# Patient Record
Sex: MALE | Race: WHITE | NOT HISPANIC OR LATINO | Employment: OTHER | URBAN - METROPOLITAN AREA
[De-identification: names, ages, dates, MRNs, and addresses within clinical notes are randomized per-mention and may not be internally consistent; named-entity substitution may affect disease eponyms.]

---

## 2020-09-03 ENCOUNTER — HOSPITAL ENCOUNTER (INPATIENT)
Facility: HOSPITAL | Age: 70
LOS: 11 days | Discharge: NURSING, REHAB OR LONG-TERM CARE | DRG: 234 | End: 2020-09-14
Attending: INTERNAL MEDICINE | Admitting: INTERNAL MEDICINE
Payer: MEDICARE

## 2020-09-03 DIAGNOSIS — I21.4 NSTEMI (NON-ST ELEVATION MYOCARDIAL INFARCTION): ICD-10-CM

## 2020-09-03 DIAGNOSIS — E66.9 OBESITY (BMI 35.0-39.9 WITHOUT COMORBIDITY): ICD-10-CM

## 2020-09-03 DIAGNOSIS — I24.9 ACS (ACUTE CORONARY SYNDROME): ICD-10-CM

## 2020-09-03 DIAGNOSIS — E78.2 MIXED HYPERLIPIDEMIA: ICD-10-CM

## 2020-09-03 DIAGNOSIS — R53.81 DEBILITY: ICD-10-CM

## 2020-09-03 DIAGNOSIS — I21.3 STEMI (ST ELEVATION MYOCARDIAL INFARCTION): ICD-10-CM

## 2020-09-03 DIAGNOSIS — Z95.1 S/P CABG (CORONARY ARTERY BYPASS GRAFT): ICD-10-CM

## 2020-09-03 DIAGNOSIS — I21.4 NSTEMI (NON-ST ELEVATED MYOCARDIAL INFARCTION): ICD-10-CM

## 2020-09-03 DIAGNOSIS — I25.10 CAD (CORONARY ARTERY DISEASE): ICD-10-CM

## 2020-09-03 DIAGNOSIS — R07.9 CHEST PAIN: ICD-10-CM

## 2020-09-03 PROCEDURE — 20000000 HC ICU ROOM

## 2020-09-03 NOTE — Clinical Note
Catheter is inserted into the ostium   right coronary artery. Hemodynamics performed. Angiography performed of the right coronary arteries in multiple views.

## 2020-09-03 NOTE — Clinical Note
71 ml injected throughout the case. 79 mL total wasted during the case. 150 mL total used in the case.

## 2020-09-04 DIAGNOSIS — I25.10 CORONARY ARTERY DISEASE, ANGINA PRESENCE UNSPECIFIED, UNSPECIFIED VESSEL OR LESION TYPE, UNSPECIFIED WHETHER NATIVE OR TRANSPLANTED HEART: ICD-10-CM

## 2020-09-04 DIAGNOSIS — I21.4 NSTEMI (NON-ST ELEVATED MYOCARDIAL INFARCTION): Primary | ICD-10-CM

## 2020-09-04 PROBLEM — I10 ESSENTIAL HYPERTENSION: Status: ACTIVE | Noted: 2020-09-04

## 2020-09-04 PROBLEM — E78.2 MIXED HYPERLIPIDEMIA: Status: ACTIVE | Noted: 2020-09-04

## 2020-09-04 PROBLEM — E66.9 OBESITY (BMI 35.0-39.9 WITHOUT COMORBIDITY): Status: ACTIVE | Noted: 2020-09-04

## 2020-09-04 PROBLEM — I24.9 ACS (ACUTE CORONARY SYNDROME): Status: RESOLVED | Noted: 2020-09-03 | Resolved: 2020-09-04

## 2020-09-04 LAB
ABO + RH BLD: NORMAL
ALBUMIN SERPL BCP-MCNC: 3.4 G/DL (ref 3.5–5.2)
ALP SERPL-CCNC: 92 U/L (ref 55–135)
ALT SERPL W/O P-5'-P-CCNC: 20 U/L (ref 10–44)
ANION GAP SERPL CALC-SCNC: 8 MMOL/L (ref 8–16)
AORTIC ROOT ANNULUS: 3.44 CM
AORTIC VALVE CUSP SEPERATION: 1.88 CM
APTT BLDCRRT: 31.9 SEC (ref 21–32)
APTT BLDCRRT: 33.5 SEC (ref 21–32)
APTT BLDCRRT: 56 SEC (ref 21–32)
ASCENDING AORTA: 2.69 CM
AST SERPL-CCNC: 43 U/L (ref 10–40)
AV PEAK GRADIENT: 10 MMHG
AV VELOCITY RATIO: 0.8
BASOPHILS # BLD AUTO: 0.06 K/UL (ref 0–0.2)
BASOPHILS NFR BLD: 0.4 % (ref 0–1.9)
BILIRUB SERPL-MCNC: 0.8 MG/DL (ref 0.1–1)
BLD GP AB SCN CELLS X3 SERPL QL: NORMAL
BNP SERPL-MCNC: 46 PG/ML (ref 0–99)
BSA FOR ECHO PROCEDURE: 2.4 M2
BUN SERPL-MCNC: 13 MG/DL (ref 8–23)
CALCIUM SERPL-MCNC: 8.3 MG/DL (ref 8.7–10.5)
CHLORIDE SERPL-SCNC: 101 MMOL/L (ref 95–110)
CO2 SERPL-SCNC: 29 MMOL/L (ref 23–29)
CREAT SERPL-MCNC: 0.9 MG/DL (ref 0.5–1.4)
CV ECHO LV RWT: 0.54 CM
DIFFERENTIAL METHOD: ABNORMAL
DOP CALC AO PEAK VEL: 1.58 M/S
DOP CALC LVOT AREA: 3.7 CM2
DOP CALC LVOT DIAMETER: 2.18 CM
DOP CALC LVOT PEAK VEL: 1.26 M/S
DOP CALC LVOT STROKE VOLUME: 104.46 CM3
DOP CALCLVOT PEAK VEL VTI: 28 CM
E WAVE DECELERATION TIME: 212.55 MSEC
E/A RATIO: 0.96
ECHO LV POSTERIOR WALL: 1.27 CM (ref 0.6–1.1)
EOSINOPHIL # BLD AUTO: 0.5 K/UL (ref 0–0.5)
EOSINOPHIL NFR BLD: 3.5 % (ref 0–8)
ERYTHROCYTE [DISTWIDTH] IN BLOOD BY AUTOMATED COUNT: 13.2 % (ref 11.5–14.5)
EST. GFR  (AFRICAN AMERICAN): >60 ML/MIN/1.73 M^2
EST. GFR  (NON AFRICAN AMERICAN): >60 ML/MIN/1.73 M^2
FRACTIONAL SHORTENING: 29 % (ref 28–44)
GLUCOSE SERPL-MCNC: 187 MG/DL (ref 70–110)
HCT VFR BLD AUTO: 43.7 % (ref 40–54)
HGB BLD-MCNC: 14.1 G/DL (ref 14–18)
IMM GRANULOCYTES # BLD AUTO: 0.04 K/UL (ref 0–0.04)
IMM GRANULOCYTES NFR BLD AUTO: 0.3 % (ref 0–0.5)
INR PPP: 1 (ref 0.8–1.2)
INTERVENTRICULAR SEPTUM: 1.21 CM (ref 0.6–1.1)
IVRT: 103.81 MSEC
LA MAJOR: 5.8 CM
LA MINOR: 6 CM
LA WIDTH: 3.91 CM
LEFT ATRIUM SIZE: 3.08 CM
LEFT ATRIUM VOLUME INDEX: 26.1 ML/M2
LEFT ATRIUM VOLUME: 60.38 CM3
LEFT INTERNAL DIMENSION IN SYSTOLE: 3.38 CM (ref 2.1–4)
LEFT VENTRICLE DIASTOLIC VOLUME INDEX: 44.83 ML/M2
LEFT VENTRICLE DIASTOLIC VOLUME: 103.69 ML
LEFT VENTRICLE MASS INDEX: 97 G/M2
LEFT VENTRICLE SYSTOLIC VOLUME INDEX: 20.3 ML/M2
LEFT VENTRICLE SYSTOLIC VOLUME: 46.86 ML
LEFT VENTRICULAR INTERNAL DIMENSION IN DIASTOLE: 4.73 CM (ref 3.5–6)
LEFT VENTRICULAR MASS: 224.39 G
LYMPHOCYTES # BLD AUTO: 3.3 K/UL (ref 1–4.8)
LYMPHOCYTES NFR BLD: 23.4 % (ref 18–48)
MAGNESIUM SERPL-MCNC: 2 MG/DL (ref 1.6–2.6)
MCH RBC QN AUTO: 28.6 PG (ref 27–31)
MCHC RBC AUTO-ENTMCNC: 32.3 G/DL (ref 32–36)
MCV RBC AUTO: 89 FL (ref 82–98)
MONOCYTES # BLD AUTO: 1 K/UL (ref 0.3–1)
MONOCYTES NFR BLD: 7.1 % (ref 4–15)
MV PEAK A VEL: 1.08 M/S
MV PEAK E VEL: 1.04 M/S
NEUTROPHILS # BLD AUTO: 9.1 K/UL (ref 1.8–7.7)
NEUTROPHILS NFR BLD: 65.3 % (ref 38–73)
NRBC BLD-RTO: 0 /100 WBC
PHOSPHATE SERPL-MCNC: 3.5 MG/DL (ref 2.7–4.5)
PLATELET # BLD AUTO: 242 K/UL (ref 150–350)
PMV BLD AUTO: 11 FL (ref 9.2–12.9)
POC ACTIVATED CLOTTING TIME K: 164 SEC (ref 74–137)
POCT GLUCOSE: 181 MG/DL (ref 70–110)
POCT GLUCOSE: 181 MG/DL (ref 70–110)
POCT GLUCOSE: 250 MG/DL (ref 70–110)
POTASSIUM SERPL-SCNC: 4.2 MMOL/L (ref 3.5–5.1)
PROT SERPL-MCNC: 6.7 G/DL (ref 6–8.4)
PROTHROMBIN TIME: 11.1 SEC (ref 9–12.5)
PV PEAK VELOCITY: 0.84 CM/S
RA MAJOR: 5.41 CM
RA PRESSURE: 3 MMHG
RA WIDTH: 3.37 CM
RBC # BLD AUTO: 4.93 M/UL (ref 4.6–6.2)
RIGHT VENTRICULAR END-DIASTOLIC DIMENSION: 4.18 CM
SAMPLE: ABNORMAL
SARS-COV-2 RDRP RESP QL NAA+PROBE: NEGATIVE
SINUS: 3.24 CM
SODIUM SERPL-SCNC: 138 MMOL/L (ref 136–145)
STJ: 2.5 CM
TRICUSPID ANNULAR PLANE SYSTOLIC EXCURSION: 2.2 CM
TROPONIN I SERPL DL<=0.01 NG/ML-MCNC: 4.81 NG/ML (ref 0–0.03)
TROPONIN I SERPL DL<=0.01 NG/ML-MCNC: 9.94 NG/ML (ref 0–0.03)
WBC # BLD AUTO: 13.99 K/UL (ref 3.9–12.7)

## 2020-09-04 PROCEDURE — 93458 L HRT ARTERY/VENTRICLE ANGIO: CPT | Mod: 26,,, | Performed by: INTERNAL MEDICINE

## 2020-09-04 PROCEDURE — 93010 EKG 12-LEAD: ICD-10-PCS | Mod: 59,,, | Performed by: INTERNAL MEDICINE

## 2020-09-04 PROCEDURE — 36415 COLL VENOUS BLD VENIPUNCTURE: CPT

## 2020-09-04 PROCEDURE — 93010 ELECTROCARDIOGRAM REPORT: CPT | Mod: 59,,, | Performed by: INTERNAL MEDICINE

## 2020-09-04 PROCEDURE — 25000003 PHARM REV CODE 250: Performed by: INTERNAL MEDICINE

## 2020-09-04 PROCEDURE — C1769 GUIDE WIRE: HCPCS | Performed by: INTERNAL MEDICINE

## 2020-09-04 PROCEDURE — 99291 PR CRITICAL CARE, E/M 30-74 MINUTES: ICD-10-PCS | Mod: 25,,, | Performed by: INTERNAL MEDICINE

## 2020-09-04 PROCEDURE — 85610 PROTHROMBIN TIME: CPT

## 2020-09-04 PROCEDURE — 94761 N-INVAS EAR/PLS OXIMETRY MLT: CPT

## 2020-09-04 PROCEDURE — 63600175 PHARM REV CODE 636 W HCPCS: Performed by: INTERNAL MEDICINE

## 2020-09-04 PROCEDURE — 85025 COMPLETE CBC W/AUTO DIFF WBC: CPT

## 2020-09-04 PROCEDURE — 85347 COAGULATION TIME ACTIVATED: CPT | Performed by: INTERNAL MEDICINE

## 2020-09-04 PROCEDURE — C1894 INTRO/SHEATH, NON-LASER: HCPCS | Performed by: INTERNAL MEDICINE

## 2020-09-04 PROCEDURE — 99291 CRITICAL CARE FIRST HOUR: CPT | Mod: 25,,, | Performed by: INTERNAL MEDICINE

## 2020-09-04 PROCEDURE — 20000000 HC ICU ROOM

## 2020-09-04 PROCEDURE — C1887 CATHETER, GUIDING: HCPCS | Performed by: INTERNAL MEDICINE

## 2020-09-04 PROCEDURE — 85730 THROMBOPLASTIN TIME PARTIAL: CPT | Mod: 91

## 2020-09-04 PROCEDURE — 99152 PR MOD CONSCIOUS SEDATION, SAME PHYS, 5+ YRS, FIRST 15 MIN: ICD-10-PCS | Mod: ,,, | Performed by: INTERNAL MEDICINE

## 2020-09-04 PROCEDURE — 93005 ELECTROCARDIOGRAM TRACING: CPT

## 2020-09-04 PROCEDURE — 25500020 PHARM REV CODE 255: Performed by: INTERNAL MEDICINE

## 2020-09-04 PROCEDURE — 84100 ASSAY OF PHOSPHORUS: CPT

## 2020-09-04 PROCEDURE — 84484 ASSAY OF TROPONIN QUANT: CPT

## 2020-09-04 PROCEDURE — 93458 L HRT ARTERY/VENTRICLE ANGIO: CPT | Performed by: INTERNAL MEDICINE

## 2020-09-04 PROCEDURE — 93458 PR CATH PLACE/CORON ANGIO, IMG SUPER/INTERP,W LEFT HEART VENTRICULOGRAPHY: ICD-10-PCS | Mod: 26,,, | Performed by: INTERNAL MEDICINE

## 2020-09-04 PROCEDURE — U0002 COVID-19 LAB TEST NON-CDC: HCPCS

## 2020-09-04 PROCEDURE — 83735 ASSAY OF MAGNESIUM: CPT

## 2020-09-04 PROCEDURE — 80053 COMPREHEN METABOLIC PANEL: CPT

## 2020-09-04 PROCEDURE — 83880 ASSAY OF NATRIURETIC PEPTIDE: CPT

## 2020-09-04 PROCEDURE — 85730 THROMBOPLASTIN TIME PARTIAL: CPT

## 2020-09-04 PROCEDURE — 99152 MOD SED SAME PHYS/QHP 5/>YRS: CPT | Performed by: INTERNAL MEDICINE

## 2020-09-04 PROCEDURE — 99152 MOD SED SAME PHYS/QHP 5/>YRS: CPT | Mod: ,,, | Performed by: INTERNAL MEDICINE

## 2020-09-04 PROCEDURE — 86901 BLOOD TYPING SEROLOGIC RH(D): CPT

## 2020-09-04 RX ORDER — SODIUM CHLORIDE 9 MG/ML
INJECTION, SOLUTION INTRAVENOUS CONTINUOUS
Status: DISCONTINUED | OUTPATIENT
Start: 2020-09-04 | End: 2020-09-05

## 2020-09-04 RX ORDER — ACETAMINOPHEN 325 MG/1
650 TABLET ORAL EVERY 4 HOURS PRN
Status: DISCONTINUED | OUTPATIENT
Start: 2020-09-04 | End: 2020-09-04

## 2020-09-04 RX ORDER — HEPARIN SODIUM 1000 [USP'U]/ML
INJECTION, SOLUTION INTRAVENOUS; SUBCUTANEOUS
Status: DISCONTINUED | OUTPATIENT
Start: 2020-09-04 | End: 2020-09-04 | Stop reason: HOSPADM

## 2020-09-04 RX ORDER — MORPHINE SULFATE 10 MG/ML
3 INJECTION INTRAMUSCULAR; INTRAVENOUS; SUBCUTANEOUS
Status: DISCONTINUED | OUTPATIENT
Start: 2020-09-04 | End: 2020-09-05

## 2020-09-04 RX ORDER — MIDAZOLAM HYDROCHLORIDE 1 MG/ML
INJECTION, SOLUTION INTRAMUSCULAR; INTRAVENOUS
Status: DISCONTINUED | OUTPATIENT
Start: 2020-09-04 | End: 2020-09-04 | Stop reason: HOSPADM

## 2020-09-04 RX ORDER — CLOPIDOGREL BISULFATE 75 MG/1
75 TABLET ORAL DAILY
Status: DISCONTINUED | OUTPATIENT
Start: 2020-09-05 | End: 2020-09-06

## 2020-09-04 RX ORDER — GLUCAGON 1 MG
1 KIT INJECTION
Status: DISCONTINUED | OUTPATIENT
Start: 2020-09-04 | End: 2020-09-04

## 2020-09-04 RX ORDER — ONDANSETRON 4 MG/1
8 TABLET, ORALLY DISINTEGRATING ORAL EVERY 8 HOURS PRN
Status: DISCONTINUED | OUTPATIENT
Start: 2020-09-04 | End: 2020-09-05

## 2020-09-04 RX ORDER — ACETAMINOPHEN 325 MG/1
650 TABLET ORAL EVERY 4 HOURS PRN
Status: DISCONTINUED | OUTPATIENT
Start: 2020-09-04 | End: 2020-09-05

## 2020-09-04 RX ORDER — SODIUM CHLORIDE 9 MG/ML
INJECTION, SOLUTION INTRAVENOUS CONTINUOUS
Status: DISCONTINUED | OUTPATIENT
Start: 2020-09-04 | End: 2020-09-04

## 2020-09-04 RX ORDER — TALC
6 POWDER (GRAM) TOPICAL NIGHTLY PRN
Status: DISCONTINUED | OUTPATIENT
Start: 2020-09-04 | End: 2020-09-09

## 2020-09-04 RX ORDER — OXYCODONE HYDROCHLORIDE 5 MG/1
5 TABLET ORAL EVERY 6 HOURS PRN
Status: DISCONTINUED | OUTPATIENT
Start: 2020-09-04 | End: 2020-09-04

## 2020-09-04 RX ORDER — AMOXICILLIN 250 MG
1 CAPSULE ORAL 2 TIMES DAILY PRN
Status: DISCONTINUED | OUTPATIENT
Start: 2020-09-04 | End: 2020-09-14 | Stop reason: HOSPADM

## 2020-09-04 RX ORDER — VERAPAMIL HYDROCHLORIDE 2.5 MG/ML
INJECTION, SOLUTION INTRAVENOUS
Status: DISCONTINUED | OUTPATIENT
Start: 2020-09-04 | End: 2020-09-04 | Stop reason: HOSPADM

## 2020-09-04 RX ORDER — HEPARIN SODIUM,PORCINE/D5W 25000/250
12 INTRAVENOUS SOLUTION INTRAVENOUS CONTINUOUS
Status: DISCONTINUED | OUTPATIENT
Start: 2020-09-04 | End: 2020-09-06

## 2020-09-04 RX ORDER — MAG HYDROX/ALUMINUM HYD/SIMETH 200-200-20
30 SUSPENSION, ORAL (FINAL DOSE FORM) ORAL ONCE
Status: COMPLETED | OUTPATIENT
Start: 2020-09-04 | End: 2020-09-04

## 2020-09-04 RX ORDER — NITROGLYCERIN 20 MG/100ML
5 INJECTION INTRAVENOUS CONTINUOUS
Status: DISCONTINUED | OUTPATIENT
Start: 2020-09-04 | End: 2020-09-04

## 2020-09-04 RX ORDER — INSULIN ASPART 100 [IU]/ML
0-5 INJECTION, SOLUTION INTRAVENOUS; SUBCUTANEOUS EVERY 6 HOURS PRN
Status: DISCONTINUED | OUTPATIENT
Start: 2020-09-04 | End: 2020-09-09

## 2020-09-04 RX ORDER — LIDOCAINE HYDROCHLORIDE 20 MG/ML
5 SOLUTION OROPHARYNGEAL ONCE
Status: COMPLETED | OUTPATIENT
Start: 2020-09-04 | End: 2020-09-04

## 2020-09-04 RX ORDER — OXYCODONE HYDROCHLORIDE 5 MG/1
5 TABLET ORAL EVERY 4 HOURS PRN
Status: DISCONTINUED | OUTPATIENT
Start: 2020-09-04 | End: 2020-09-05

## 2020-09-04 RX ORDER — GLUCAGON 1 MG
1 KIT INJECTION
Status: DISCONTINUED | OUTPATIENT
Start: 2020-09-04 | End: 2020-09-10

## 2020-09-04 RX ORDER — SODIUM CHLORIDE 0.9 % (FLUSH) 0.9 %
10 SYRINGE (ML) INJECTION
Status: DISCONTINUED | OUTPATIENT
Start: 2020-09-04 | End: 2020-09-14 | Stop reason: HOSPADM

## 2020-09-04 RX ORDER — METOPROLOL TARTRATE 25 MG/1
12.5 TABLET ORAL 2 TIMES DAILY
Status: DISCONTINUED | OUTPATIENT
Start: 2020-09-04 | End: 2020-09-07

## 2020-09-04 RX ORDER — OXYCODONE HYDROCHLORIDE 5 MG/1
10 TABLET ORAL EVERY 6 HOURS PRN
Status: DISCONTINUED | OUTPATIENT
Start: 2020-09-04 | End: 2020-09-04

## 2020-09-04 RX ORDER — FENTANYL CITRATE 50 UG/ML
INJECTION, SOLUTION INTRAMUSCULAR; INTRAVENOUS
Status: DISCONTINUED | OUTPATIENT
Start: 2020-09-04 | End: 2020-09-04 | Stop reason: HOSPADM

## 2020-09-04 RX ORDER — MORPHINE SULFATE 10 MG/ML
4 INJECTION INTRAMUSCULAR; INTRAVENOUS; SUBCUTANEOUS EVERY 4 HOURS PRN
Status: DISCONTINUED | OUTPATIENT
Start: 2020-09-04 | End: 2020-09-04

## 2020-09-04 RX ORDER — NITROGLYCERIN 20 MG/100ML
INJECTION INTRAVENOUS
Status: DISCONTINUED | OUTPATIENT
Start: 2020-09-04 | End: 2020-09-04 | Stop reason: HOSPADM

## 2020-09-04 RX ORDER — CLOPIDOGREL 300 MG/1
600 TABLET, FILM COATED ORAL ONCE
Status: COMPLETED | OUTPATIENT
Start: 2020-09-04 | End: 2020-09-04

## 2020-09-04 RX ORDER — NAPROXEN SODIUM 220 MG/1
81 TABLET, FILM COATED ORAL DAILY
Status: DISCONTINUED | OUTPATIENT
Start: 2020-09-04 | End: 2020-09-12

## 2020-09-04 RX ORDER — ATORVASTATIN CALCIUM 20 MG/1
80 TABLET, FILM COATED ORAL NIGHTLY
Status: DISCONTINUED | OUTPATIENT
Start: 2020-09-04 | End: 2020-09-14 | Stop reason: HOSPADM

## 2020-09-04 RX ORDER — LIDOCAINE HYDROCHLORIDE 10 MG/ML
INJECTION, SOLUTION EPIDURAL; INFILTRATION; INTRACAUDAL; PERINEURAL
Status: DISCONTINUED | OUTPATIENT
Start: 2020-09-04 | End: 2020-09-04 | Stop reason: HOSPADM

## 2020-09-04 RX ORDER — INSULIN ASPART 100 [IU]/ML
0-5 INJECTION, SOLUTION INTRAVENOUS; SUBCUTANEOUS EVERY 6 HOURS PRN
Status: DISCONTINUED | OUTPATIENT
Start: 2020-09-04 | End: 2020-09-04

## 2020-09-04 RX ORDER — ONDANSETRON 2 MG/ML
4 INJECTION INTRAMUSCULAR; INTRAVENOUS EVERY 12 HOURS PRN
Status: DISCONTINUED | OUTPATIENT
Start: 2020-09-04 | End: 2020-09-05

## 2020-09-04 RX ADMIN — ATORVASTATIN CALCIUM 80 MG: 40 TABLET, FILM COATED ORAL at 08:09

## 2020-09-04 RX ADMIN — ASPIRIN 81 MG CHEWABLE TABLET 81 MG: 81 TABLET CHEWABLE at 07:09

## 2020-09-04 RX ADMIN — INSULIN ASPART 2 UNITS: 100 INJECTION, SOLUTION INTRAVENOUS; SUBCUTANEOUS at 05:09

## 2020-09-04 RX ADMIN — SODIUM CHLORIDE: 0.9 INJECTION, SOLUTION INTRAVENOUS at 04:09

## 2020-09-04 RX ADMIN — NITROGLYCERIN 5 MCG/MIN: 20 INJECTION INTRAVENOUS at 03:09

## 2020-09-04 RX ADMIN — CLOPIDOGREL BISULFATE 600 MG: 300 TABLET, FILM COATED ORAL at 03:09

## 2020-09-04 RX ADMIN — ATORVASTATIN CALCIUM 80 MG: 40 TABLET, FILM COATED ORAL at 03:09

## 2020-09-04 RX ADMIN — LIDOCAINE HYDROCHLORIDE 5 ML: 20 SOLUTION ORAL; TOPICAL at 02:09

## 2020-09-04 RX ADMIN — ALUMINUM HYDROXIDE, MAGNESIUM HYDROXIDE, AND SIMETHICONE 30 ML: 200; 200; 20 SUSPENSION ORAL at 02:09

## 2020-09-04 RX ADMIN — SODIUM CHLORIDE 500 ML: 0.9 INJECTION, SOLUTION INTRAVENOUS at 04:09

## 2020-09-04 RX ADMIN — SODIUM CHLORIDE: 0.9 INJECTION, SOLUTION INTRAVENOUS at 10:09

## 2020-09-04 RX ADMIN — ACETAMINOPHEN 650 MG: 325 TABLET ORAL at 04:09

## 2020-09-04 RX ADMIN — SODIUM CHLORIDE: 0.9 INJECTION, SOLUTION INTRAVENOUS at 08:09

## 2020-09-04 RX ADMIN — METOPROLOL TARTRATE 12.5 MG: 25 TABLET, FILM COATED ORAL at 08:09

## 2020-09-04 RX ADMIN — HEPARIN SODIUM AND DEXTROSE 16 UNITS/KG/HR: 10000; 5 INJECTION INTRAVENOUS at 06:09

## 2020-09-04 NOTE — ED PROVIDER NOTES
Patient was not evaluated by the emergency room.  Patient stop the emergency room for COVID swab.  Patient is a direct admit transfer from an outside medical facility.     Judd Camp MD  09/04/20 0043

## 2020-09-04 NOTE — PROGRESS NOTES
[facility]  ICU Shift Summary  Date: 9/4/2020      COVID Test: (--)  Isolation: No active isolations     Prehospitalization: Mehdisarah Polanco  Admit Date / LOS : 9/3/2020/ 1 days    Diagnosis: NSTEMI (non-ST elevated myocardial infarction)    Consults:        Active: Cardio       Needed: N/A     Code Status: Full Code   Advanced Directive: <no information>    LDA: PIV       Central Lines/Site/Justification:Patient Does Not Have Central Line       Urinary Cath/Order/Justification:Patient Does Not Have Urinary Catheter    Vasopressors/Infusions:    sodium chloride 0.9% 100 mL/hr at 09/04/20 1700    heparin (porcine) in D5W 16.036 Units/kg/hr (09/04/20 1700)          GOALS: Volume/ Hemodynamic: HR < 50                     RASS: 0  alert and calm    Pain Management: PO       Pain Controlled: yes     Rhythm: NSR    Respiratory Device: Nasal Cannula                  Most Recent SBT/ SAT: N/A       MOVE Screen: FAIL    VTE Prophylaxis: Mechanical  Mobility: Bedrest  Stress Ulcer Prophylaxis: No    Dietary: PO  Tolerance: yes  /  Advancement: yes    I & O (24h):    Intake/Output Summary (Last 24 hours) at 9/4/2020 1829  Last data filed at 9/4/2020 1700  Gross per 24 hour   Intake 1624.34 ml   Output 1200 ml   Net 424.34 ml        Restraints: No    Significant Dates:  Post Op Date: N/A  Rescue Date: N/A  Imaging/ Diagnostics: N/A    Noteworthy Labs:  none    CBC/Anemia Labs: Coags:    Recent Labs   Lab 09/04/20  0159   WBC 13.99*   HGB 14.1   HCT 43.7      MCV 89   RDW 13.2    Recent Labs   Lab 09/04/20  0159 09/04/20  1207   INR 1.0  --    APTT 31.9 33.5*        Chemistries:   Recent Labs   Lab 09/04/20  0159      K 4.2      CO2 29   BUN 13   CREATININE 0.9   CALCIUM 8.3*   PROT 6.7   BILITOT 0.8   ALKPHOS 92   ALT 20   AST 43*   MG 2.0   PHOS 3.5        Cardiac Enzymes: Ejection Fractions:    Recent Labs     09/04/20  0159 09/04/20  0745   TROPONINI 4.814* 9.943*    No results found  for: EF     POCT Glucose: HbA1c:    Recent Labs   Lab 09/04/20  0645 09/04/20  1749   POCTGLUCOSE 181* 250*    No results found for: HGBA1C        ICU LOS 18h  Level of Care: OK to Transfer    Shift Summary/Plan for the shift: Patient went to cath lab and had an ECHO done. Found blocks in LAD and circumflex. Plan is to have patient transferred to [facility] for open heart surgery. VS stable. Patient remains free from falls, injury, and breakdown throughout shift.

## 2020-09-04 NOTE — ASSESSMENT & PLAN NOTE
Patient with non-STEMI.  On nitro and heparin drips.  Discussed various options patient.  After detailed discussion decided to proceed with a coronary angiogram.    Risks, benefits and alternatives of the catheterization procedure were discussed with the patient.The risks of coronary angiography include but are not limited to: bleeding, infection, death, heart attack, arrhythmia, kidney injury or failure, potential need for dialysis, allergic reactions, stroke, need for emergency surgery, hematoma, pseudoaneurysm etc.  Should stenting be indicated, the patient has agreed to dual anti-platelet therapy for 1-consecutive year with a drug-eluting stent and a minimum of 1-month with the use of a bare metal stent. Additionally, pt is aware that non-compliance is likely to result in stent clotting with heart attack, heart failure, and/or death  The risks of moderate sedation include hypotension, respiratory depression, arrhythmias, bronchospasm, and death. Informed consent was obtained and the  patient is agreeable to proceed with the procedure. Consent was placed on the chart.    Continue aspirin, plavix, statins.

## 2020-09-04 NOTE — EICU
EICU    Pt seen on video, pt appears quite stable, nonlabored breathing alert and oriented  138/74 hr 64 97% nc    Nursing advised that nocturnist Dr Frias is taking care of the admission.  I asked her to let Dr Frias know to let me if any issues, that pt does not have an admit note and I will leave one if he would like  - I advised to call EICU if any issues, I am available for anything

## 2020-09-04 NOTE — H&P
"[facility] Hospital Medicine  History & Physical    Patient Name: Bc Garcia  MRN: [id number]  Admission Date: 9/3/2020  Attending Physician: No att. providers found   Primary Care Provider: Primary Doctor No         Patient information was obtained from patient, past medical records and ER records.     Subjective:     Principal Problem:NSTEMI (non-ST elevated myocardial infarction)    Chief Complaint: No chief complaint on file.       HPI: Mr. Garcia is a 69yo man with a past medical history of HLD, tobacco abuse (smoked 2 ppd x 20 years, quit 95), obesity, HTN and DM2.  He does see a local cardiologist in [place], [place].  He tells me he had an echo and nuclear stress test in May of this year.  The echo was normal, as was the nuclear stress test, "other than one little red hot-spot he was worried about."  He never underwent a LHC.    He was in his regular state of health until 6 pm yesterday when he developed substernal, burning chest pain while walking his dog.  This lasted about 1 hour, then eased off.  After arriving home, he was lifting an ice chest and developed the same pain again.  The pain radiated up into both shoulders.  This lasted until he finally went to bed later that evening.  He awoke at 10am and felt better.     By mid-day his home generator went out and he started to become overheated.  This precipitated a second event, similar to yesterday's.  This time the pain was a 10/10, substernal, and again burning.  He states he has had bad GERD before, but that this was nothing like that sensation.  He does deny SOB, nausea and associated diaphoresis.  He was unable to lie flat, as the pain worsened, then improved with sitting up.      In the ED at [facility] at , he was treated with ASA 325mg,  1" NTP to CW, morphine 4mg iv, IV heparin infusion, and zofran 4mg iv.  Outside labs revealed a troponin of 0.36, MB 7.5 in the ED.  CXR was normal.    Currently he is " "still having CP, stated as 4/10.    Past Medical History:   Diagnosis Date    Diabetes mellitus     Hyperlipidemia     Hypertension     Morbid obesity due to excess calories        Past Surgical History:   Procedure Laterality Date    left knee skin excision Left        Review of patient's allergies indicates:  No Known Allergies    Home medications:  Patient states:  "I've got a bag of them at home.  I really can't remember any of them.  I know I used to be on Metformin, but they changed it to another pill.  My wife will bring the bag of medications tomorrow."    Family History     Problem Relation (Age of Onset)    Cancer Father    Diabetes Paternal Grandmother    Stroke Mother        Tobacco Use    Smoking status: Former Smoker     Packs/day: 2.00     Years: 20.00     Pack years: 40.00     Types: Cigarettes     Quit date: 1995     Years since quittin.0   Substance and Sexual Activity    Alcohol use: Not Currently     Comment: Quit in     Drug use: Never    Sexual activity: Not on file     Review of Systems   Constitutional: Positive for activity change. Negative for chills, fatigue and fever.   HENT: Negative for congestion.    Eyes: Negative for redness.   Respiratory: Positive for chest tightness and shortness of breath. Negative for cough.    Cardiovascular: Positive for chest pain.   Gastrointestinal: Negative for abdominal pain, constipation, diarrhea, nausea and vomiting.   Endocrine: Positive for heat intolerance.   Genitourinary: Negative for dysuria.   Musculoskeletal: Negative for arthralgias.   Skin: Negative for wound.   Allergic/Immunologic: Negative for immunocompromised state.   Neurological: Positive for light-headedness. Negative for dizziness and weakness.   Hematological: Does not bruise/bleed easily.   Psychiatric/Behavioral: Negative for confusion. The patient is not nervous/anxious.      Objective:     Vital Signs (Most Recent):  Temp: 97.8 °F (36.6 °C) (20 " 0130)  Pulse: 69 (09/04/20 0300)  Resp: 12 (09/04/20 0300)  BP: (!) 155/76 (09/04/20 0230)  SpO2: 99 % (09/04/20 0300) Vital Signs (24h Range):  Temp:  [97.8 °F (36.6 °C)-98.6 °F (37 °C)] 97.8 °F (36.6 °C)  Pulse:  [63-72] 69  Resp:  [12-27] 12  SpO2:  [96 %-100 %] 99 %  BP: (136-165)/(73-85) 155/76     Weight: 118.4 kg (261 lb 0.4 oz)  Body mass index is 38.55 kg/m².    Physical Exam  Vitals signs and nursing note reviewed.   Constitutional:       General: He is not in acute distress.     Appearance: He is well-developed. He is morbidly obese. He is not ill-appearing, toxic-appearing or diaphoretic.   HENT:      Head: Normocephalic and atraumatic.      Nose: Nose normal.      Mouth/Throat:      Pharynx: No oropharyngeal exudate.   Eyes:      General: No scleral icterus.        Right eye: No discharge.         Left eye: No discharge.      Conjunctiva/sclera: Conjunctivae normal.      Pupils: Pupils are equal, round, and reactive to light.   Neck:      Musculoskeletal: Normal range of motion and neck supple.      Thyroid: No thyromegaly.      Vascular: No JVD.      Trachea: No tracheal deviation.   Cardiovascular:      Rate and Rhythm: Normal rate and regular rhythm.      Heart sounds: Normal heart sounds. No murmur. No friction rub. No gallop.    Pulmonary:      Effort: Pulmonary effort is normal. No respiratory distress.      Breath sounds: Normal breath sounds. No stridor. No decreased breath sounds, wheezing, rhonchi or rales.   Chest:      Chest wall: No tenderness.   Abdominal:      General: Bowel sounds are normal. There is no distension.      Palpations: Abdomen is soft. There is no mass.      Tenderness: There is no abdominal tenderness. There is no guarding or rebound.      Comments: Truncal obesity   Genitourinary:     Comments: No landers in place  Musculoskeletal: Normal range of motion.         General: No tenderness.   Lymphadenopathy:      Cervical: No cervical adenopathy.      Comments: No peripheral  edema   Skin:     General: Skin is warm and dry.      Coloration: Skin is not pale.      Findings: No erythema or rash.   Neurological:      Mental Status: He is alert and oriented to person, place, and time.      GCS: GCS eye subscore is 4. GCS verbal subscore is 5. GCS motor subscore is 6.      Cranial Nerves: No cranial nerve deficit.      Motor: No abnormal muscle tone.      Coordination: Coordination normal.      Deep Tendon Reflexes: Reflexes normal.   Psychiatric:         Behavior: Behavior normal.         Thought Content: Thought content normal.         Cognition and Memory: Cognition and memory normal.         Judgment: Judgment normal.           CRANIAL NERVES     CN III, IV, VI   Pupils are equal, round, and reactive to light.       Significant Labs:   Recent Results (from the past 24 hour(s))   COVID-19 Rapid Screening    Collection Time: 09/04/20 12:09 AM   Result Value Ref Range    SARS-CoV-2 RNA, Amplification, Qual Negative Negative   APTT    Collection Time: 09/04/20  1:59 AM   Result Value Ref Range    aPTT 31.9 21.0 - 32.0 sec   Protime-INR    Collection Time: 09/04/20  1:59 AM   Result Value Ref Range    Prothrombin Time 11.1 9.0 - 12.5 sec    INR 1.0 0.8 - 1.2   CBC auto differential    Collection Time: 09/04/20  1:59 AM   Result Value Ref Range    WBC 13.99 (H) 3.90 - 12.70 K/uL    RBC 4.93 4.60 - 6.20 M/uL    Hemoglobin 14.1 14.0 - 18.0 g/dL    Hematocrit 43.7 40.0 - 54.0 %    Mean Corpuscular Volume 89 82 - 98 fL    Mean Corpuscular Hemoglobin 28.6 27.0 - 31.0 pg    Mean Corpuscular Hemoglobin Conc 32.3 32.0 - 36.0 g/dL    RDW 13.2 11.5 - 14.5 %    Platelets 242 150 - 350 K/uL    MPV 11.0 9.2 - 12.9 fL    Immature Granulocytes 0.3 0.0 - 0.5 %    Gran # (ANC) 9.1 (H) 1.8 - 7.7 K/uL    Immature Grans (Abs) 0.04 0.00 - 0.04 K/uL    Lymph # 3.3 1.0 - 4.8 K/uL    Mono # 1.0 0.3 - 1.0 K/uL    Eos # 0.5 0.0 - 0.5 K/uL    Baso # 0.06 0.00 - 0.20 K/uL    nRBC 0 0 /100 WBC    Gran% 65.3 38.0 - 73.0 %  "   Lymph% 23.4 18.0 - 48.0 %    Mono% 7.1 4.0 - 15.0 %    Eosinophil% 3.5 0.0 - 8.0 %    Basophil% 0.4 0.0 - 1.9 %    Differential Method Automated    Comprehensive metabolic panel    Collection Time: 09/04/20  1:59 AM   Result Value Ref Range    Sodium 138 136 - 145 mmol/L    Potassium 4.2 3.5 - 5.1 mmol/L    Chloride 101 95 - 110 mmol/L    CO2 29 23 - 29 mmol/L    Glucose 187 (H) 70 - 110 mg/dL    BUN, Bld 13 8 - 23 mg/dL    Creatinine 0.9 0.5 - 1.4 mg/dL    Calcium 8.3 (L) 8.7 - 10.5 mg/dL    Total Protein 6.7 6.0 - 8.4 g/dL    Albumin 3.4 (L) 3.5 - 5.2 g/dL    Total Bilirubin 0.8 0.1 - 1.0 mg/dL    Alkaline Phosphatase 92 55 - 135 U/L    AST 43 (H) 10 - 40 U/L    ALT 20 10 - 44 U/L    Anion Gap 8 8 - 16 mmol/L    eGFR if African American >60 >60 mL/min/1.73 m^2    eGFR if non African American >60 >60 mL/min/1.73 m^2   Magnesium    Collection Time: 09/04/20  1:59 AM   Result Value Ref Range    Magnesium 2.0 1.6 - 2.6 mg/dL   Phosphorus    Collection Time: 09/04/20  1:59 AM   Result Value Ref Range    Phosphorus 3.5 2.7 - 4.5 mg/dL   Troponin I    Collection Time: 09/04/20  1:59 AM   Result Value Ref Range    Troponin I 4.814 (H) 0.000 - 0.026 ng/mL   Brain natriuretic peptide    Collection Time: 09/04/20  1:59 AM   Result Value Ref Range    BNP 46 0 - 99 pg/mL         Significant Imaging:   Outside CXR: normal    EKG here:  Sinus rhythm with 1st degree AVB  LAFB  No ST changes  NS T wave changes lateral leads    Assessment/Plan:     * NSTEMI (non-ST elevated myocardial infarction)  HEART score of 8  Troponin elevated from 0.36 to 4.814 here  Received ASA 325mg at outside ED, then 81mg po qday  On IV heparin protocol, continued from outside ED  1/2" NTP removed from CW from outside ED  IV Nitroglycerin infusion begun here, titratable to CP   -His SBP dropped to 60 on 1" NTP at outside ED, so use with care    -?Right sided/Inferior MI   -NS 500cc bolus, then 100cc/hr now  Writing for Plavix 600mg po load now, then " 75mg po qday  Serial CE's  Started BB, Lopressor 12.5mg PO BID  Telemetry, serial EKG's  Case discussed with Cardiology, Dr. Manuel at 3am   -Planning [facility] in am, so make NPO  Echo in am        Uncontrolled type 2 diabetes mellitus without complication, without long-term current use of insulin  He cannot remember what pill he takes for this  Holding NPO, so for now, low dose SSI protocol  Check HgA1c      Essential hypertension  Holding all anti-HTN meds except low dose BB for iv NTG      Mixed hyperlipidemia  He is unsure what he takes for this at home  I started Lipitor high dose 80mg po qday, first now  Lipid panel ordered for am      Obesity (BMI 35.0-39.9 without comorbidity)  Encourage weight loss        VTE Risk Mitigation (From admission, onward)         Ordered     heparin 25,000 units in dextrose 5% 250 mL (100 units/mL) infusion LOW INTENSITY nomogram - [facility]  Continuous     Question:  Heparin Infusion Adjustment (DO NOT MODIFY ANSWER)  Answer:  \[email or web address]\epic\Images\Pharmacy\HeparinInfusions\heparin LOW INTENSITY nomogram for [facility] [id number]    09/04/20 0132     heparin 25,000 units in dextrose 5% (100 units/ml) IV bolus from bag - ADDITIONAL PRN BOLUS - 30 units/kg (max bolus 4000 units)  As needed (PRN)     Question:  Heparin Infusion Adjustment (DO NOT MODIFY ANSWER)  Answer:  \\[email or web address]\epic\Images\Pharmacy\HeparinInfusions\heparin LOW INTENSITY nomogram for [facility] [id number].pdf    09/04/20 0132     heparin 25,000 units in dextrose 5% (100 units/ml) IV bolus from bag - ADDITIONAL PRN BOLUS - 60 units/kg (max bolus 4000 units)  As needed (PRN)     Question:  Heparin Infusion Adjustment (DO NOT MODIFY ANSWER)  Answer:  \\[email or web address]\epic\Images\Pharmacy\HeparinInfusions\heparin LOW INTENSITY nomogram for [facility] [id number].pdf    09/04/20 0132     Place RAFI hose  Until discontinued      09/04/20 0140     IP VTE HIGH RISK PATIENT  Once      09/04/20 0140     Place sequential compression device  Until discontinued       09/04/20 0140              Critical care time spent on the evaluation and treatment of severe organ dysfunction, review of pertinent labs and imaging studies, discussions with consulting providers and discussions with patient/family: 50 minutes.     KEVIN Frias MD  Department of Hospital Medicine   [facility]

## 2020-09-04 NOTE — HPI
"Mr. Garcia is a 69yo man with a past medical history of HLD, tobacco abuse (smoked 2 ppd x 20 years, quit 95), obesity, HTN and DM2.  He does see a local cardiologist in [place], [place].  He tells me he had an echo and nuclear stress test in May of this year.  The echo was normal, as was the nuclear stress test, "other than one little red hot-spot he was worried about."  He never underwent a LHC.    He was in his regular state of health until 6 pm yesterday when he developed substernal, burning chest pain while walking his dog.  This lasted about 1 hour, then eased off.  After arriving home, he was lifting an ice chest and developed the same pain again.  The pain radiated up into both shoulders.  This lasted until he finally went to bed later that evening.  He awoke at 10am and felt better.     By mid-day his home generator went out and he started to become overheated.  This precipitated a second event, similar to yesterday's.  This time the pain was a 10/10, substernal, and again burning.  He states he has had bad GERD before, but that this was nothing like that sensation.  He does deny SOB, nausea and associated diaphoresis.  He was unable to lie flat, as the pain worsened, then improved with sitting up.      In the ED at [facility], he was treated with ASA 325mg,  1" NTP to CW, morphine 4mg iv, IV heparin infusion, and zofran 4mg iv.  Outside labs revealed a troponin of 0.36, MB 7.5 in the ED.  CXR was normal.    Currently he is still having CP, stated as 4/10.  "

## 2020-09-04 NOTE — PLAN OF CARE
Patient transferred to [facility]  from [facility] in [place], [place] this shift for chest pain and NSTEMI. AAOx4, afebrile, NSR with 1st degree AV block. Pt with continued 3/10 CP. GI coctail given. Nitro gtt initated after troponin of 4 resulted. MD Frias spoke with MD Manuel. Pt prepped for cath lab in AM; groin and wrists shaved and chlorhexidine bath given. Heparin restarted and currently infusing at 14 units/kg/hr per nomagram. Next aPTT at 1045. Plavix and lipitor given. 500c NS bolus given, NS infusing at 100cc/hr. No falls, injury, or skin breakdown. Plan of care reviewed with patient and pt's wife via telephone.

## 2020-09-04 NOTE — ASSESSMENT & PLAN NOTE
"HEART score of 8  Troponin elevated from 0.36 to 4.814 here  Received ASA 325mg at outside ED, then 81mg po qday  On IV heparin protocol, continued from outside ED  1/2" NTP removed from CW from outside ED  IV Nitroglycerin infusion begun here, titratable to CP   -His SBP dropped to 60 on 1" NTP at outside ED, so use with care    -?Right sided/Inferior MI   -NS 500cc bolus, then 100cc/hr now  Writing for Plavix 600mg po load now, then 75mg po qday  Serial CE's  Started BB, Lopressor 12.5mg PO BID  Telemetry, serial EKG's  Case discussed with Cardiology, Dr. Manuel at 3am   -Planning LHC in am, so make NPO  Echo in am      "

## 2020-09-04 NOTE — CONSULTS
SW provided patient with copy of Nancysantony's Advance Directives and Living Will and explained each.  SW advised patient that if he wished to complete the forms, SW would be available to witness.  Patient verbalized understanding of information.    Linda Koch LMSW, [facility]  9/4/20

## 2020-09-04 NOTE — PLAN OF CARE
(Physician in Lead of Transfers)   Outside Transfer Acceptance Note / Regional Referral Center    Name: Bc Pang    Transferring Physician: Dr. Cole///Emergency Medicine    Accepting Physician: RACHELE Lynch MD    Date of Acceptance:  September 3, 2020    Transferring Facility: [facility] ED     Destination Facility and Admitting Physician:  [facility] ICU///Hospital Medicine///Dr. Frias    Reason for Transfer: NSTEMI/ACS (cardiology not available at current facility)    Report from Transferring Physician/Hospital course:  70-year-old male with history of hypertension, hyperlipidemia, and diabetes mellitus.  Presented to the emergency department with 2 days of chest pain.  By report, he had a stress test several months ago that did not have acute abnormalities. In the ER he received aspirin, morphine, Zofran, 1 inch topical nitroglycerin, heparin infusion, and normal saline.  The ER physician noted that his blood pressure decreased with the nitroglycerin paste.  Nitroglycerin paste was removed, and systolic blood pressure improved.  With that, chest discomfort recurred.  He was then treated with 0.5 inch of nitroglycerin paste and morphine.  Currently reported to be hemodynamically stable without active chest pain.      Case discussed with Cardiology (Dr. Manuel) and Hospital Medicine (Dr. Frias) at [facility].  With the waxing and waning chest discomfort, along with the fluctuations in blood pressure, I requested an ICU bed initially. With the extensive distance, he will transfer via air ambulance.    VS:  Blood pressure 146/80, pulse 66, oxygen saturation 96%    Labs:  Initial troponin 0.326 (repeat troponin is currently pending), by report cell counts were stable and renal function/electrolytes had no acute abnormalities.  COVID testing was negative    EKG noted sinus rhythm with left anterior fascicular block.  No acute ST changes.    Radiographs:  ER physician noted  chest x-ray had no acute abnormalities.    To Do List:   -Admit to Hospital Medicine, ICU status  -Notify Cardiology on arrival  -COVID test on arrival to ICU  -Trend cardiac enzymes  -Heparin protocol    Upon patient arrival to ICU, please contact Hospital Medicine on call.  Will need repeat COVID testing on arrival to the ICU.        RACHELE Lynch MD  Hospital Medicine Staff  Cell: [phone]

## 2020-09-04 NOTE — HPI
" Mr. Garcia is a 71yo man with a past medical history of HLD, tobacco abuse (smoked 2 ppd x 20 years, quit 95), obesity, HTN and DM2.  He does see a local cardiologist in [place], [place].  He tells me he had an echo and nuclear stress test in May of this year.  The echo was normal, as was the nuclear stress test, "other than one little red hot-spot he was worried about."  He never underwent a LHC.     He was in his regular state of health until 6 pm yesterday when he developed substernal, burning chest pain while walking his dog.  This lasted about 1 hour, then eased off.  After arriving home, he was lifting an ice chest and developed the same pain again.  The pain radiated up into both shoulders.  This lasted until he finally went to bed later that evening.  He awoke at 10am and felt better.      By mid-day his home generator went out and he started to become overheated.  This precipitated a second event, similar to yesterday's.  This time the pain was a 10/10, substernal, and again burning.  He states he has had bad GERD before, but that this was nothing like that sensation.  He does deny SOB, nausea and associated diaphoresis.  He was unable to lie flat, as the pain worsened, then improved with sitting up.       In the ED at [facility], he was treated with ASA 325mg,  1" NTP to CW, morphine 4mg iv, IV heparin infusion, and zofran 4mg iv.  Outside labs revealed a troponin of 0.36, MB 7.5 in the ED.  CXR was normal.    Troponin subsequently trinidad 4 drip and nitroglycerin drip in ICU.  States chest pain-free nitroglycerin.  Denies orthopnea, PND, swelling feet.  EKG was personally reviewed and demonstrated normal sinus rhythm with left anterior fascicular block and poor R-wave progression.  No evidence of acute STEMI.       "

## 2020-09-04 NOTE — SUBJECTIVE & OBJECTIVE
Past Medical History:   Diagnosis Date    Diabetes mellitus     Hyperlipidemia     Hypertension     Morbid obesity due to excess calories        Past Surgical History:   Procedure Laterality Date    left knee skin excision Left        Review of patient's allergies indicates:  No Known Allergies    No current facility-administered medications on file prior to encounter.      No current outpatient medications on file prior to encounter.     Family History     Problem Relation (Age of Onset)    Cancer Father    Diabetes Paternal Grandmother    Stroke Mother        Tobacco Use    Smoking status: Former Smoker     Packs/day: 2.00     Years: 20.00     Pack years: 40.00     Types: Cigarettes     Quit date: 1995     Years since quittin.0   Substance and Sexual Activity    Alcohol use: Not Currently     Comment: Quit in     Drug use: Never    Sexual activity: Not on file     Review of Systems   Cardiovascular: Positive for chest pain.     Objective:     Vital Signs (Most Recent):  Temp: 97.8 °F (36.6 °C) (20 0400)  Pulse: 86 (2045)  Resp: (!) 26 (2045)  BP: (!) 156/72 (20)  SpO2: 96 % (20) Vital Signs (24h Range):  Temp:  [97.8 °F (36.6 °C)-98.6 °F (37 °C)] 97.8 °F (36.6 °C)  Pulse:  [63-86] 86  Resp:  [12-27] 26  SpO2:  [95 %-100 %] 96 %  BP: (110-165)/(53-85) 156/72     Weight: 118.4 kg (261 lb 0.4 oz)  Body mass index is 38.55 kg/m².    SpO2: 96 %  O2 Device (Oxygen Therapy): nasal cannula      Intake/Output Summary (Last 24 hours) at 2020 0719  Last data filed at 2020 0640  Gross per 24 hour   Intake 767.86 ml   Output 250 ml   Net 517.86 ml       Lines/Drains/Airways     None                 Physical Exam   Constitutional: He is oriented to person, place, and time. He appears well-developed and well-nourished.   HENT:   Head: Normocephalic.   Eyes: Pupils are equal, round, and reactive to light. Conjunctivae are normal.   Neck: Normal range of  motion. Neck supple.   Cardiovascular: Normal rate, regular rhythm and normal heart sounds.   Pulmonary/Chest: Effort normal and breath sounds normal.   Abdominal: Soft. Bowel sounds are normal.   Neurological: He is alert and oriented to person, place, and time.   Skin: Skin is warm.   Nursing note and vitals reviewed.      Significant Labs:   BMP:   Recent Labs   Lab 09/04/20 0159   *      K 4.2      CO2 29   BUN 13   CREATININE 0.9   CALCIUM 8.3*   MG 2.0   , CMP   Recent Labs   Lab 09/04/20 0159      K 4.2      CO2 29   *   BUN 13   CREATININE 0.9   CALCIUM 8.3*   PROT 6.7   ALBUMIN 3.4*   BILITOT 0.8   ALKPHOS 92   AST 43*   ALT 20   ANIONGAP 8   ESTGFRAFRICA >60   EGFRNONAA >60   , CBC   Recent Labs   Lab 09/04/20 0159   WBC 13.99*   HGB 14.1   HCT 43.7      , INR   Recent Labs   Lab 09/04/20 0159   INR 1.0   , Lipid Panel No results for input(s): CHOL, HDL, LDLCALC, TRIG, CHOLHDL in the last 48 hours., Troponin   Recent Labs   Lab 09/04/20 0159   TROPONINI 4.814*    and All pertinent lab results from the last 24 hours have been reviewed.    Significant Imaging: Echocardiogram: Transthoracic echo (TTE) complete (Cupid Only): No results found for this or any previous visit.

## 2020-09-04 NOTE — ASSESSMENT & PLAN NOTE
He is unsure what he takes for this at home  I started Lipitor high dose 80mg po qday, first now  Lipid panel ordered for am

## 2020-09-04 NOTE — SUBJECTIVE & OBJECTIVE
"Past Medical History:   Diagnosis Date    Diabetes mellitus     Hyperlipidemia     Hypertension     Morbid obesity due to excess calories        Past Surgical History:   Procedure Laterality Date    left knee skin excision Left        Review of patient's allergies indicates:  No Known Allergies    Home medications:  Patient states:  "I've got a bag of them at home.  I really can't remember any of them.  I know I used to be on Metformin, but they changed it to another pill.  My wife will bring the bag of medications tomorrow."    Family History     Problem Relation (Age of Onset)    Cancer Father    Diabetes Paternal Grandmother    Stroke Mother        Tobacco Use    Smoking status: Former Smoker     Packs/day: 2.00     Years: 20.00     Pack years: 40.00     Types: Cigarettes     Quit date: 1995     Years since quittin.0   Substance and Sexual Activity    Alcohol use: Not Currently     Comment: Quit in     Drug use: Never    Sexual activity: Not on file     Review of Systems   Constitutional: Positive for activity change. Negative for chills, fatigue and fever.   HENT: Negative for congestion.    Eyes: Negative for redness.   Respiratory: Positive for chest tightness and shortness of breath. Negative for cough.    Cardiovascular: Positive for chest pain.   Gastrointestinal: Negative for abdominal pain, constipation, diarrhea, nausea and vomiting.   Endocrine: Positive for heat intolerance.   Genitourinary: Negative for dysuria.   Musculoskeletal: Negative for arthralgias.   Skin: Negative for wound.   Allergic/Immunologic: Negative for immunocompromised state.   Neurological: Positive for light-headedness. Negative for dizziness and weakness.   Hematological: Does not bruise/bleed easily.   Psychiatric/Behavioral: Negative for confusion. The patient is not nervous/anxious.      Objective:     Vital Signs (Most Recent):  Temp: 97.8 °F (36.6 °C) (20 0130)  Pulse: 69 (20 0300)  Resp: 12 " (09/04/20 0300)  BP: (!) 155/76 (09/04/20 0230)  SpO2: 99 % (09/04/20 0300) Vital Signs (24h Range):  Temp:  [97.8 °F (36.6 °C)-98.6 °F (37 °C)] 97.8 °F (36.6 °C)  Pulse:  [63-72] 69  Resp:  [12-27] 12  SpO2:  [96 %-100 %] 99 %  BP: (136-165)/(73-85) 155/76     Weight: 118.4 kg (261 lb 0.4 oz)  Body mass index is 38.55 kg/m².    Physical Exam  Vitals signs and nursing note reviewed.   Constitutional:       General: He is not in acute distress.     Appearance: He is well-developed. He is morbidly obese. He is not ill-appearing, toxic-appearing or diaphoretic.   HENT:      Head: Normocephalic and atraumatic.      Nose: Nose normal.      Mouth/Throat:      Pharynx: No oropharyngeal exudate.   Eyes:      General: No scleral icterus.        Right eye: No discharge.         Left eye: No discharge.      Conjunctiva/sclera: Conjunctivae normal.      Pupils: Pupils are equal, round, and reactive to light.   Neck:      Musculoskeletal: Normal range of motion and neck supple.      Thyroid: No thyromegaly.      Vascular: No JVD.      Trachea: No tracheal deviation.   Cardiovascular:      Rate and Rhythm: Normal rate and regular rhythm.      Heart sounds: Normal heart sounds. No murmur. No friction rub. No gallop.    Pulmonary:      Effort: Pulmonary effort is normal. No respiratory distress.      Breath sounds: Normal breath sounds. No stridor. No decreased breath sounds, wheezing, rhonchi or rales.   Chest:      Chest wall: No tenderness.   Abdominal:      General: Bowel sounds are normal. There is no distension.      Palpations: Abdomen is soft. There is no mass.      Tenderness: There is no abdominal tenderness. There is no guarding or rebound.      Comments: Truncal obesity   Genitourinary:     Comments: No landers in place  Musculoskeletal: Normal range of motion.         General: No tenderness.   Lymphadenopathy:      Cervical: No cervical adenopathy.      Comments: No peripheral edema   Skin:     General: Skin is warm and  dry.      Coloration: Skin is not pale.      Findings: No erythema or rash.   Neurological:      Mental Status: He is alert and oriented to person, place, and time.      GCS: GCS eye subscore is 4. GCS verbal subscore is 5. GCS motor subscore is 6.      Cranial Nerves: No cranial nerve deficit.      Motor: No abnormal muscle tone.      Coordination: Coordination normal.      Deep Tendon Reflexes: Reflexes normal.   Psychiatric:         Behavior: Behavior normal.         Thought Content: Thought content normal.         Cognition and Memory: Cognition and memory normal.         Judgment: Judgment normal.           CRANIAL NERVES     CN III, IV, VI   Pupils are equal, round, and reactive to light.       Significant Labs:   Recent Results (from the past 24 hour(s))   COVID-19 Rapid Screening    Collection Time: 09/04/20 12:09 AM   Result Value Ref Range    SARS-CoV-2 RNA, Amplification, Qual Negative Negative   APTT    Collection Time: 09/04/20  1:59 AM   Result Value Ref Range    aPTT 31.9 21.0 - 32.0 sec   Protime-INR    Collection Time: 09/04/20  1:59 AM   Result Value Ref Range    Prothrombin Time 11.1 9.0 - 12.5 sec    INR 1.0 0.8 - 1.2   CBC auto differential    Collection Time: 09/04/20  1:59 AM   Result Value Ref Range    WBC 13.99 (H) 3.90 - 12.70 K/uL    RBC 4.93 4.60 - 6.20 M/uL    Hemoglobin 14.1 14.0 - 18.0 g/dL    Hematocrit 43.7 40.0 - 54.0 %    Mean Corpuscular Volume 89 82 - 98 fL    Mean Corpuscular Hemoglobin 28.6 27.0 - 31.0 pg    Mean Corpuscular Hemoglobin Conc 32.3 32.0 - 36.0 g/dL    RDW 13.2 11.5 - 14.5 %    Platelets 242 150 - 350 K/uL    MPV 11.0 9.2 - 12.9 fL    Immature Granulocytes 0.3 0.0 - 0.5 %    Gran # (ANC) 9.1 (H) 1.8 - 7.7 K/uL    Immature Grans (Abs) 0.04 0.00 - 0.04 K/uL    Lymph # 3.3 1.0 - 4.8 K/uL    Mono # 1.0 0.3 - 1.0 K/uL    Eos # 0.5 0.0 - 0.5 K/uL    Baso # 0.06 0.00 - 0.20 K/uL    nRBC 0 0 /100 WBC    Gran% 65.3 38.0 - 73.0 %    Lymph% 23.4 18.0 - 48.0 %    Mono% 7.1 4.0  - 15.0 %    Eosinophil% 3.5 0.0 - 8.0 %    Basophil% 0.4 0.0 - 1.9 %    Differential Method Automated    Comprehensive metabolic panel    Collection Time: 09/04/20  1:59 AM   Result Value Ref Range    Sodium 138 136 - 145 mmol/L    Potassium 4.2 3.5 - 5.1 mmol/L    Chloride 101 95 - 110 mmol/L    CO2 29 23 - 29 mmol/L    Glucose 187 (H) 70 - 110 mg/dL    BUN, Bld 13 8 - 23 mg/dL    Creatinine 0.9 0.5 - 1.4 mg/dL    Calcium 8.3 (L) 8.7 - 10.5 mg/dL    Total Protein 6.7 6.0 - 8.4 g/dL    Albumin 3.4 (L) 3.5 - 5.2 g/dL    Total Bilirubin 0.8 0.1 - 1.0 mg/dL    Alkaline Phosphatase 92 55 - 135 U/L    AST 43 (H) 10 - 40 U/L    ALT 20 10 - 44 U/L    Anion Gap 8 8 - 16 mmol/L    eGFR if African American >60 >60 mL/min/1.73 m^2    eGFR if non African American >60 >60 mL/min/1.73 m^2   Magnesium    Collection Time: 09/04/20  1:59 AM   Result Value Ref Range    Magnesium 2.0 1.6 - 2.6 mg/dL   Phosphorus    Collection Time: 09/04/20  1:59 AM   Result Value Ref Range    Phosphorus 3.5 2.7 - 4.5 mg/dL   Troponin I    Collection Time: 09/04/20  1:59 AM   Result Value Ref Range    Troponin I 4.814 (H) 0.000 - 0.026 ng/mL   Brain natriuretic peptide    Collection Time: 09/04/20  1:59 AM   Result Value Ref Range    BNP 46 0 - 99 pg/mL         Significant Imaging:   Outside CXR: normal    EKG here:  Sinus rhythm with 1st degree AVB  LAFB  No ST changes  NS T wave changes lateral leads

## 2020-09-04 NOTE — PLAN OF CARE
Discharge planning assessment completed with patient's assistance.  Patient from home with spouse and independent.  Patient has no DME at this time.  Patient receives primary care at [facility] with Dr. Brody Barajas ([phone]).  Patient's preferred pharmacy is [facility] in [place], [place].  Patient's wife, Nathaly, will be available to help patient at home.  Patient requested and was provided with information on [employer] Patient Assistance Program for assistance with hospital bill.  Patient's communication board updated with 's contact information.  Patient provided with Advance Directives and Living Will information to review and complete if he desires to do so.       09/04/20 1247   Discharge Assessment   Assessment Type Discharge Planning Assessment   Confirmed/corrected address and phone number on facesheet? Yes   Assessment information obtained from? Patient   Expected Length of Stay (days) 2   Communicated expected length of stay with patient/caregiver   (Treatment is ongoing)   Prior to hospitilization cognitive status: Alert/Oriented   Prior to hospitalization functional status: Independent   Current cognitive status: Alert/Oriented   Current Functional Status: Independent   Facility Arrived From: home   Lives With spouse   Able to Return to Prior Arrangements yes   Is patient able to care for self after discharge? Yes   Who are your caregiver(s) and their phone number(s)? Nathaly Garcia; wife; [phone]   Patient's perception of discharge disposition home or selfcare   Readmission Within the Last 30 Days no previous admission in last 30 days   Patient currently being followed by outpatient case management? No   Patient currently receives any other outside agency services? No   Equipment Currently Used at Home none   Do you have any problems affording any of your prescribed medications? No   Is the patient taking medications as prescribed? yes   Does the patient have  transportation home? Yes   Transportation Anticipated family or friend will provide   Dialysis Name and Scheduled days n/a   Does the patient receive services at the [facility]? No   Discharge Plan A Home   Discharge Plan B Home   DME Needed Upon Discharge  none   Patient/Family in Agreement with Plan yes   Linda Koch LMSW, CCM  9/4/20

## 2020-09-04 NOTE — ED NOTES
Pt arrived via air med as direct admit to ICU here in ED for covid swab. Pt in NAD, VVS. Will continue to monitor.
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
## 2020-09-04 NOTE — ASSESSMENT & PLAN NOTE
He cannot remember what pill he takes for this  Holding NPO, so for now, low dose SSI protocol  Check HgA1c
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
## 2020-09-04 NOTE — CARE UPDATE
Patient seen and examined.  Agree with Dr. Frias's treatment and plan.  NSTEMI.  Appreciate Cards input.  [facility] showing 3 vessel CAD.  Cardiology has discussed case with [facility] and patient to be transferred.  Currently asymptomatic and hemodynamically stable.

## 2020-09-05 PROBLEM — I24.9 ACS (ACUTE CORONARY SYNDROME): Status: ACTIVE | Noted: 2020-09-05

## 2020-09-05 LAB
ANION GAP SERPL CALC-SCNC: 8 MMOL/L (ref 8–16)
APTT BLDCRRT: 53.2 SEC (ref 21–32)
BASOPHILS # BLD AUTO: 0.05 K/UL (ref 0–0.2)
BASOPHILS NFR BLD: 0.4 % (ref 0–1.9)
BUN SERPL-MCNC: 9 MG/DL (ref 8–23)
CALCIUM SERPL-MCNC: 8.2 MG/DL (ref 8.7–10.5)
CHLORIDE SERPL-SCNC: 104 MMOL/L (ref 95–110)
CHOLEST SERPL-MCNC: 123 MG/DL (ref 120–199)
CHOLEST/HDLC SERPL: 4.7 {RATIO} (ref 2–5)
CO2 SERPL-SCNC: 25 MMOL/L (ref 23–29)
CREAT SERPL-MCNC: 0.8 MG/DL (ref 0.5–1.4)
DIFFERENTIAL METHOD: ABNORMAL
EOSINOPHIL # BLD AUTO: 0.6 K/UL (ref 0–0.5)
EOSINOPHIL NFR BLD: 5.4 % (ref 0–8)
ERYTHROCYTE [DISTWIDTH] IN BLOOD BY AUTOMATED COUNT: 13.2 % (ref 11.5–14.5)
EST. GFR  (AFRICAN AMERICAN): >60 ML/MIN/1.73 M^2
EST. GFR  (NON AFRICAN AMERICAN): >60 ML/MIN/1.73 M^2
GLUCOSE SERPL-MCNC: 175 MG/DL (ref 70–110)
HCT VFR BLD AUTO: 41.2 % (ref 40–54)
HDLC SERPL-MCNC: 26 MG/DL (ref 40–75)
HDLC SERPL: 21.1 % (ref 20–50)
HGB BLD-MCNC: 13.2 G/DL (ref 14–18)
IMM GRANULOCYTES # BLD AUTO: 0.03 K/UL (ref 0–0.04)
IMM GRANULOCYTES NFR BLD AUTO: 0.3 % (ref 0–0.5)
LDLC SERPL CALC-MCNC: 72.4 MG/DL (ref 63–159)
LYMPHOCYTES # BLD AUTO: 3.4 K/UL (ref 1–4.8)
LYMPHOCYTES NFR BLD: 29.4 % (ref 18–48)
MAGNESIUM SERPL-MCNC: 1.9 MG/DL (ref 1.6–2.6)
MCH RBC QN AUTO: 28.1 PG (ref 27–31)
MCHC RBC AUTO-ENTMCNC: 32 G/DL (ref 32–36)
MCV RBC AUTO: 88 FL (ref 82–98)
MONOCYTES # BLD AUTO: 0.9 K/UL (ref 0.3–1)
MONOCYTES NFR BLD: 7.8 % (ref 4–15)
NEUTROPHILS # BLD AUTO: 6.6 K/UL (ref 1.8–7.7)
NEUTROPHILS NFR BLD: 56.7 % (ref 38–73)
NONHDLC SERPL-MCNC: 97 MG/DL
NRBC BLD-RTO: 0 /100 WBC
PHOSPHATE SERPL-MCNC: 2.9 MG/DL (ref 2.7–4.5)
PLATELET # BLD AUTO: 207 K/UL (ref 150–350)
PMV BLD AUTO: 11 FL (ref 9.2–12.9)
POCT GLUCOSE: 139 MG/DL (ref 70–110)
POCT GLUCOSE: 183 MG/DL (ref 70–110)
POCT GLUCOSE: 200 MG/DL (ref 70–110)
POCT GLUCOSE: 254 MG/DL (ref 70–110)
POTASSIUM SERPL-SCNC: 3.9 MMOL/L (ref 3.5–5.1)
RBC # BLD AUTO: 4.7 M/UL (ref 4.6–6.2)
SODIUM SERPL-SCNC: 137 MMOL/L (ref 136–145)
TRIGL SERPL-MCNC: 123 MG/DL (ref 30–150)
WBC # BLD AUTO: 11.55 K/UL (ref 3.9–12.7)

## 2020-09-05 PROCEDURE — 25000003 PHARM REV CODE 250: Performed by: INTERNAL MEDICINE

## 2020-09-05 PROCEDURE — 85730 THROMBOPLASTIN TIME PARTIAL: CPT

## 2020-09-05 PROCEDURE — 99291 CRITICAL CARE FIRST HOUR: CPT | Mod: 25,,, | Performed by: INTERNAL MEDICINE

## 2020-09-05 PROCEDURE — 84100 ASSAY OF PHOSPHORUS: CPT

## 2020-09-05 PROCEDURE — 63600175 PHARM REV CODE 636 W HCPCS: Performed by: HOSPITALIST

## 2020-09-05 PROCEDURE — 80061 LIPID PANEL: CPT

## 2020-09-05 PROCEDURE — 25000003 PHARM REV CODE 250: Performed by: HOSPITALIST

## 2020-09-05 PROCEDURE — 80048 BASIC METABOLIC PNL TOTAL CA: CPT

## 2020-09-05 PROCEDURE — 83735 ASSAY OF MAGNESIUM: CPT

## 2020-09-05 PROCEDURE — 20000000 HC ICU ROOM

## 2020-09-05 PROCEDURE — 63600175 PHARM REV CODE 636 W HCPCS: Performed by: INTERNAL MEDICINE

## 2020-09-05 PROCEDURE — 85025 COMPLETE CBC W/AUTO DIFF WBC: CPT

## 2020-09-05 PROCEDURE — 36415 COLL VENOUS BLD VENIPUNCTURE: CPT

## 2020-09-05 PROCEDURE — 99291 PR CRITICAL CARE, E/M 30-74 MINUTES: ICD-10-PCS | Mod: 25,,, | Performed by: INTERNAL MEDICINE

## 2020-09-05 RX ORDER — MORPHINE SULFATE 10 MG/ML
2 INJECTION INTRAMUSCULAR; INTRAVENOUS; SUBCUTANEOUS
Status: DISCONTINUED | OUTPATIENT
Start: 2020-09-05 | End: 2020-09-07

## 2020-09-05 RX ORDER — ACETAMINOPHEN 325 MG/1
650 TABLET ORAL EVERY 4 HOURS PRN
Status: DISCONTINUED | OUTPATIENT
Start: 2020-09-05 | End: 2020-09-14 | Stop reason: HOSPADM

## 2020-09-05 RX ORDER — ONDANSETRON 2 MG/ML
8 INJECTION INTRAMUSCULAR; INTRAVENOUS EVERY 6 HOURS PRN
Status: DISCONTINUED | OUTPATIENT
Start: 2020-09-05 | End: 2020-09-14 | Stop reason: HOSPADM

## 2020-09-05 RX ADMIN — METOPROLOL TARTRATE 12.5 MG: 25 TABLET, FILM COATED ORAL at 08:09

## 2020-09-05 RX ADMIN — SODIUM CHLORIDE: 0.9 INJECTION, SOLUTION INTRAVENOUS at 06:09

## 2020-09-05 RX ADMIN — ACETAMINOPHEN 650 MG: 325 TABLET ORAL at 08:09

## 2020-09-05 RX ADMIN — CLOPIDOGREL 75 MG: 75 TABLET, FILM COATED ORAL at 09:09

## 2020-09-05 RX ADMIN — ASPIRIN 81 MG CHEWABLE TABLET 81 MG: 81 TABLET CHEWABLE at 09:09

## 2020-09-05 RX ADMIN — ATORVASTATIN CALCIUM 80 MG: 40 TABLET, FILM COATED ORAL at 08:09

## 2020-09-05 RX ADMIN — INSULIN ASPART 3 UNITS: 100 INJECTION, SOLUTION INTRAVENOUS; SUBCUTANEOUS at 01:09

## 2020-09-05 RX ADMIN — MORPHINE SULFATE 2 MG: 10 INJECTION, SOLUTION INTRAMUSCULAR; INTRAVENOUS at 10:09

## 2020-09-05 RX ADMIN — HEPARIN SODIUM AND DEXTROSE 16 UNITS/KG/HR: 10000; 5 INJECTION INTRAVENOUS at 01:09

## 2020-09-05 RX ADMIN — METOPROLOL TARTRATE 12.5 MG: 25 TABLET, FILM COATED ORAL at 09:09

## 2020-09-05 NOTE — ASSESSMENT & PLAN NOTE
Patient with non-STEMI.  On nitro and heparin drips.  Discussed various options patient.  After detailed discussion decided to proceed with a coronary angiogram.    Risks, benefits and alternatives of the catheterization procedure were discussed with the patient.The risks of coronary angiography include but are not limited to: bleeding, infection, death, heart attack, arrhythmia, kidney injury or failure, potential need for dialysis, allergic reactions, stroke, need for emergency surgery, hematoma, pseudoaneurysm etc.  Should stenting be indicated, the patient has agreed to dual anti-platelet therapy for 1-consecutive year with a drug-eluting stent and a minimum of 1-month with the use of a bare metal stent. Additionally, pt is aware that non-compliance is likely to result in stent clotting with heart attack, heart failure, and/or death  The risks of moderate sedation include hypotension, respiratory depression, arrhythmias, bronchospasm, and death. Informed consent was obtained and the  patient is agreeable to proceed with the procedure. Consent was placed on the chart.    Continue aspirin, plavix, statins.    9/5:  Angiogram yesterday demonstrated multiple vessel coronary artery disease and left main disease.  Has been accepted for transfer to [facility].  Awaiting bed.  Going to [facility] for coronary up as bypass grafting.  Case discussed with Dr. Vazquez from cardiothoracic surgery at [facility].  Continue heparin drip, aspirin, Plavix.

## 2020-09-05 NOTE — ASSESSMENT & PLAN NOTE
He is unsure what he takes for this at home  I started Lipitor high dose 80mg po qday, first now

## 2020-09-05 NOTE — HOSPITAL COURSE
69 y/o male admitted to ICU with NSTEMI.  Placed on NTG and Heparin drips.  Cardiology consulted.  Patient underwent LHC showing 3 vessel CAD.  Cardiology discussed case with CTS at [facility] with plans to transfer over there for CTS evaluation.  No further chest pain and hemodynamically stable.

## 2020-09-05 NOTE — CARE UPDATE
[facility]  ICU Shift Summary  Date: 9/5/2020      COVID Test: (--)  Isolation: No active isolations     Prehospitalization: Home, tc from [facility] ([facility])  Admit Date / LOS : 9/3/2020/ 2 days    Diagnosis: NSTEMI (non-ST elevated myocardial infarction)    Consults:        Active: Cardio and SW       Needed: N/A     Code Status: Full Code   Advanced Directive: <no information>    LDA: PIV       Central Lines/Site/Justification:Patient Does Not Have Central Line       Urinary Cath/Order/Justification:Patient Does Not Have Urinary Catheter    Vasopressors/Infusions:    sodium chloride 0.9% 100 mL/hr at 09/05/20 0600    heparin (porcine) in D5W 16.036 Units/kg/hr (09/05/20 0600)          GOALS: Volume/ Hemodynamic: N/A                     RASS: 0  alert and calm    Pain Management: IV and PO       Pain Controlled: not applicable, not needed this shift    Rhythm: NSR    Respiratory Device: Nasal Cannula                  Most Recent SBT/ SAT: N/A       MOVE Screen: PASS    VTE Prophylaxis: Pharm and Mechanical  Mobility: Ambulatory and A: Assist  Stress Ulcer Prophylaxis: No (needed)    Dietary: NPO after midnight (cardiac diet otherwise)  Tolerance: yes  /  Advancement: no    I & O (24h):    Intake/Output Summary (Last 24 hours) at 9/5/2020 0615  Last data filed at 9/5/2020 0600  Gross per 24 hour   Intake 2343.68 ml   Output 2050 ml   Net 293.68 ml        Restraints: No    Significant Dates:  Post Op Date: L heart cath 9/4  Rescue Date: N/A  Imaging/ Diagnostics: ECHO 9/4    Noteworthy Labs:  none    CBC/Anemia Labs: Coags:    Recent Labs   Lab 09/04/20 0159 09/05/20  0304   WBC 13.99* 11.55   HGB 14.1 13.2*   HCT 43.7 41.2    207   MCV 89 88   RDW 13.2 13.2    Recent Labs   Lab 09/04/20 0159 09/04/20 2035 09/05/20  0304   INR 1.0  --   --   --    APTT 31.9   < > 56.0* 53.2*    < > = values in this interval not displayed.        Chemistries:   Recent Labs   Lab  09/04/20  0159 09/05/20  0304    137   K 4.2 3.9    104   CO2 29 25   BUN 13 9   CREATININE 0.9 0.8   CALCIUM 8.3* 8.2*   PROT 6.7  --    BILITOT 0.8  --    ALKPHOS 92  --    ALT 20  --    AST 43*  --    MG 2.0 1.9   PHOS 3.5 2.9        Cardiac Enzymes: Ejection Fractions:    Recent Labs     09/04/20  0159 09/04/20  0745   TROPONINI 4.814* 9.943*    No results found for: EF     POCT Glucose: HbA1c:    Recent Labs   Lab 09/04/20  0645 09/04/20  1749 09/04/20  2259   POCTGLUCOSE 181* 250* 181*    No results found for: HGBA1C        ICU LOS 1d 6h  Level of Care: Critical Care, transfer to [facility]    Shift Summary/Plan for the shift: See plan of care

## 2020-09-05 NOTE — SUBJECTIVE & OBJECTIVE
Interval History:  Patient doing fine.  Awaiting transfer to [facility].  Has been chest pain-free.  On heparin drip.  No complications from cardiac catheterization    Review of Systems   Constitution: Negative.   HENT: Negative.    Eyes: Negative.    Cardiovascular: Negative for chest pain.   Respiratory: Negative.    Endocrine: Negative.    Hematologic/Lymphatic: Negative.    Skin: Negative.    Musculoskeletal: Negative.    Gastrointestinal: Negative.    Genitourinary: Negative.    Neurological: Negative.    Psychiatric/Behavioral: Negative.    Allergic/Immunologic: Negative.      Objective:     Vital Signs (Most Recent):  Temp: 98.8 °F (37.1 °C) (09/05/20 1300)  Pulse: 78 (09/05/20 1330)  Resp: (!) 28 (09/05/20 1330)  BP: (!) 165/79 (09/05/20 1330)  SpO2: 98 % (09/05/20 1330) Vital Signs (24h Range):  Temp:  [98.1 °F (36.7 °C)-98.8 °F (37.1 °C)] 98.8 °F (37.1 °C)  Pulse:  [61-81] 78  Resp:  [10-35] 28  SpO2:  [91 %-100 %] 98 %  BP: ()/() 165/79     Weight: 118.4 kg (261 lb 0.4 oz)  Body mass index is 38.55 kg/m².     SpO2: 98 %  O2 Device (Oxygen Therapy): nasal cannula      Intake/Output Summary (Last 24 hours) at 9/5/2020 1507  Last data filed at 9/5/2020 0800  Gross per 24 hour   Intake 2141.63 ml   Output 1650 ml   Net 491.63 ml       Lines/Drains/Airways     None                 Physical Exam   Constitutional: He is oriented to person, place, and time. He appears well-developed and well-nourished.   HENT:   Head: Normocephalic.   Eyes: Pupils are equal, round, and reactive to light. Conjunctivae are normal.   Neck: Normal range of motion. Neck supple.   Cardiovascular: Normal rate, regular rhythm and normal heart sounds.   Pulmonary/Chest: Effort normal and breath sounds normal.   Abdominal: Soft. Bowel sounds are normal.   Neurological: He is alert and oriented to person, place, and time.   Skin: Skin is warm.   Nursing note and vitals reviewed.      Significant Labs:   BMP:   Recent Labs   Lab  09/04/20  0159 09/05/20  0304   * 175*    137   K 4.2 3.9    104   CO2 29 25   BUN 13 9   CREATININE 0.9 0.8   CALCIUM 8.3* 8.2*   MG 2.0 1.9   , CMP   Recent Labs   Lab 09/04/20  0159 09/05/20  0304    137   K 4.2 3.9    104   CO2 29 25   * 175*   BUN 13 9   CREATININE 0.9 0.8   CALCIUM 8.3* 8.2*   PROT 6.7  --    ALBUMIN 3.4*  --    BILITOT 0.8  --    ALKPHOS 92  --    AST 43*  --    ALT 20  --    ANIONGAP 8 8   ESTGFRAFRICA >60 >60   EGFRNONAA >60 >60   , CBC   Recent Labs   Lab 09/04/20 0159 09/05/20  0304   WBC 13.99* 11.55   HGB 14.1 13.2*   HCT 43.7 41.2    207   , INR   Recent Labs   Lab 09/04/20  0159   INR 1.0   , Lipid Panel   Recent Labs   Lab 09/05/20  0304   CHOL 123   HDL 26*   LDLCALC 72.4   TRIG 123   CHOLHDL 21.1   , Troponin   Recent Labs   Lab 09/04/20  0159 09/04/20  0745   TROPONINI 4.814* 9.943*    and All pertinent lab results from the last 24 hours have been reviewed.    Significant Imaging: Echocardiogram:   Transthoracic echo (TTE) complete (Cupid Only):   Results for orders placed or performed during the hospital encounter of 09/03/20   Echo Color Flow Doppler? Yes; Bubble Contrast? No   Result Value Ref Range    Ascending aorta 2.69 cm    STJ 2.50 cm    Ao peak russel 1.58 m/s    IVRT 103.81 msec    IVS 1.21 (A) 0.6 - 1.1 cm    LA size 3.08 cm    Left Atrium Major Axis 5.80 cm    Left Atrium Minor Axis 6.00 cm    LVIDD 4.73 3.5 - 6.0 cm    LVIDS 3.38 2.1 - 4.0 cm    LVOT diameter 2.18 cm    LVOT peak VTI 28.00 cm    PW 1.27 (A) 0.6 - 1.1 cm    MV Peak A Russel 1.08 m/s    E wave decelartion time 212.55 msec    MV Peak E Russel 1.04 m/s    RA Major Axis 5.41 cm    RA Width 3.37 cm    RVDD 4.18 cm    Sinus 3.24 cm    TAPSE 2.20 cm    LA WIDTH 3.91 cm    Ao root annulus 3.44 cm    AORTIC VALVE CUSP SEPERATION 1.88 cm    PV PEAK VELOCITY 0.84 cm/s    LV Diastolic Volume 103.69 mL    LV Systolic Volume 46.86 mL    LVOT peak russel 1.26 m/s    FS 29 %    LA  volume 60.38 cm3    LV mass 224.39 g    Left Ventricle Relative Wall Thickness 0.54 cm    AV Velocity Ratio 0.80     E/A ratio 0.96     LVOT area 3.7 cm2    LVOT stroke volume 104.46 cm3    AV peak gradient 10 mmHg    LV Systolic Volume Index 20.3 mL/m2    LV Diastolic Volume Index 44.83 mL/m2    LA Volume Index 26.1 mL/m2    LV Mass Index 97 g/m2    BSA 2.4 m2    Right Atrial Pressure (from IVC) 3 mmHg    Narrative    · Mild concentric left ventricular hypertrophy.  · Normal left ventricular systolic function. The estimated ejection   fraction is 60%.  · Normal LV diastolic function.  · Septal wall has abnormal motion.  · Normal right ventricular systolic function.  · Mild to moderate left atrial enlargement.  · Mild right atrial enlargement.  · Normal central venous pressure (3 mmHg).

## 2020-09-05 NOTE — PROGRESS NOTES
[facility]  Cardiology  Progress Note    Patient Name: Bc Garcia  MRN: [id number]  Admission Date: 9/3/2020  Hospital Length of Stay: 2 days  Code Status: Full Code   Attending Physician: Brian Marino MD   Primary Care Physician: Primary Doctor No  Expected Discharge Date:   Principal Problem:NSTEMI (non-ST elevated myocardial infarction)    Subjective:       Interval History:  Patient doing fine.  Awaiting transfer to [facility].  Has been chest pain-free.  On heparin drip.  No complications from cardiac catheterization    Review of Systems   Constitution: Negative.   HENT: Negative.    Eyes: Negative.    Cardiovascular: Negative for chest pain.   Respiratory: Negative.    Endocrine: Negative.    Hematologic/Lymphatic: Negative.    Skin: Negative.    Musculoskeletal: Negative.    Gastrointestinal: Negative.    Genitourinary: Negative.    Neurological: Negative.    Psychiatric/Behavioral: Negative.    Allergic/Immunologic: Negative.      Objective:     Vital Signs (Most Recent):  Temp: 98.8 °F (37.1 °C) (09/05/20 1300)  Pulse: 78 (09/05/20 1330)  Resp: (!) 28 (09/05/20 1330)  BP: (!) 165/79 (09/05/20 1330)  SpO2: 98 % (09/05/20 1330) Vital Signs (24h Range):  Temp:  [98.1 °F (36.7 °C)-98.8 °F (37.1 °C)] 98.8 °F (37.1 °C)  Pulse:  [61-81] 78  Resp:  [10-35] 28  SpO2:  [91 %-100 %] 98 %  BP: ()/() 165/79     Weight: 118.4 kg (261 lb 0.4 oz)  Body mass index is 38.55 kg/m².     SpO2: 98 %  O2 Device (Oxygen Therapy): nasal cannula      Intake/Output Summary (Last 24 hours) at 9/5/2020 1507  Last data filed at 9/5/2020 0800  Gross per 24 hour   Intake 2141.63 ml   Output 1650 ml   Net 491.63 ml       Lines/Drains/Airways     None                 Physical Exam   Constitutional: He is oriented to person, place, and time. He appears well-developed and well-nourished.   HENT:   Head: Normocephalic.   Eyes: Pupils are equal, round, and reactive to light. Conjunctivae are normal.   Neck:  Normal range of motion. Neck supple.   Cardiovascular: Normal rate, regular rhythm and normal heart sounds.   Pulmonary/Chest: Effort normal and breath sounds normal.   Abdominal: Soft. Bowel sounds are normal.   Neurological: He is alert and oriented to person, place, and time.   Skin: Skin is warm.   Nursing note and vitals reviewed.      Significant Labs:   BMP:   Recent Labs   Lab 09/04/20  0159 09/05/20  0304   * 175*    137   K 4.2 3.9    104   CO2 29 25   BUN 13 9   CREATININE 0.9 0.8   CALCIUM 8.3* 8.2*   MG 2.0 1.9   , CMP   Recent Labs   Lab 09/04/20  0159 09/05/20  0304    137   K 4.2 3.9    104   CO2 29 25   * 175*   BUN 13 9   CREATININE 0.9 0.8   CALCIUM 8.3* 8.2*   PROT 6.7  --    ALBUMIN 3.4*  --    BILITOT 0.8  --    ALKPHOS 92  --    AST 43*  --    ALT 20  --    ANIONGAP 8 8   ESTGFRAFRICA >60 >60   EGFRNONAA >60 >60   , CBC   Recent Labs   Lab 09/04/20  0159 09/05/20  0304   WBC 13.99* 11.55   HGB 14.1 13.2*   HCT 43.7 41.2    207   , INR   Recent Labs   Lab 09/04/20  0159   INR 1.0   , Lipid Panel   Recent Labs   Lab 09/05/20  0304   CHOL 123   HDL 26*   LDLCALC 72.4   TRIG 123   CHOLHDL 21.1   , Troponin   Recent Labs   Lab 09/04/20  0159 09/04/20  0745   TROPONINI 4.814* 9.943*    and All pertinent lab results from the last 24 hours have been reviewed.    Significant Imaging: Echocardiogram:   Transthoracic echo (TTE) complete (Cupid Only):   Results for orders placed or performed during the hospital encounter of 09/03/20   Echo Color Flow Doppler? Yes; Bubble Contrast? No   Result Value Ref Range    Ascending aorta 2.69 cm    STJ 2.50 cm    Ao peak russel 1.58 m/s    IVRT 103.81 msec    IVS 1.21 (A) 0.6 - 1.1 cm    LA size 3.08 cm    Left Atrium Major Axis 5.80 cm    Left Atrium Minor Axis 6.00 cm    LVIDD 4.73 3.5 - 6.0 cm    LVIDS 3.38 2.1 - 4.0 cm    LVOT diameter 2.18 cm    LVOT peak VTI 28.00 cm    PW 1.27 (A) 0.6 - 1.1 cm    MV Peak A Russel 1.08  m/s    E wave decelartion time 212.55 msec    MV Peak E Russel 1.04 m/s    RA Major Axis 5.41 cm    RA Width 3.37 cm    RVDD 4.18 cm    Sinus 3.24 cm    TAPSE 2.20 cm    LA WIDTH 3.91 cm    Ao root annulus 3.44 cm    AORTIC VALVE CUSP SEPERATION 1.88 cm    PV PEAK VELOCITY 0.84 cm/s    LV Diastolic Volume 103.69 mL    LV Systolic Volume 46.86 mL    LVOT peak russel 1.26 m/s    FS 29 %    LA volume 60.38 cm3    LV mass 224.39 g    Left Ventricle Relative Wall Thickness 0.54 cm    AV Velocity Ratio 0.80     E/A ratio 0.96     LVOT area 3.7 cm2    LVOT stroke volume 104.46 cm3    AV peak gradient 10 mmHg    LV Systolic Volume Index 20.3 mL/m2    LV Diastolic Volume Index 44.83 mL/m2    LA Volume Index 26.1 mL/m2    LV Mass Index 97 g/m2    BSA 2.4 m2    Right Atrial Pressure (from IVC) 3 mmHg    Narrative    · Mild concentric left ventricular hypertrophy.  · Normal left ventricular systolic function. The estimated ejection   fraction is 60%.  · Normal LV diastolic function.  · Septal wall has abnormal motion.  · Normal right ventricular systolic function.  · Mild to moderate left atrial enlargement.  · Mild right atrial enlargement.  · Normal central venous pressure (3 mmHg).        Assessment and Plan:     Brief HPI:     * NSTEMI (non-ST elevated myocardial infarction)  Patient with non-STEMI.  On nitro and heparin drips.  Discussed various options patient.  After detailed discussion decided to proceed with a coronary angiogram.    Risks, benefits and alternatives of the catheterization procedure were discussed with the patient.The risks of coronary angiography include but are not limited to: bleeding, infection, death, heart attack, arrhythmia, kidney injury or failure, potential need for dialysis, allergic reactions, stroke, need for emergency surgery, hematoma, pseudoaneurysm etc.  Should stenting be indicated, the patient has agreed to dual anti-platelet therapy for 1-consecutive year with a drug-eluting stent and a  minimum of 1-month with the use of a bare metal stent. Additionally, pt is aware that non-compliance is likely to result in stent clotting with heart attack, heart failure, and/or death  The risks of moderate sedation include hypotension, respiratory depression, arrhythmias, bronchospasm, and death. Informed consent was obtained and the  patient is agreeable to proceed with the procedure. Consent was placed on the chart.    Continue aspirin, plavix, statins.    9/5:  Angiogram yesterday demonstrated multiple vessel coronary artery disease and left main disease.  Has been accepted for transfer to [facility].  Awaiting bed.  Going to [facility] for coronary up as bypass grafting.  Case discussed with Dr. Vazquez from cardiothoracic surgery at [facility].  Continue heparin drip, aspirin, Plavix.    Mixed hyperlipidemia  statins    Essential hypertension        Uncontrolled type 2 diabetes mellitus without complication, without long-term current use of insulin        Obesity (BMI 35.0-39.9 without comorbidity)            VTE Risk Mitigation (From admission, onward)         Ordered     heparin 25,000 units in dextrose 5% 250 mL (100 units/mL) infusion LOW INTENSITY nomogram - [facility]  Continuous     Question:  Heparin Infusion Adjustment (DO NOT MODIFY ANSWER)  Answer:  \[email or web address]\Images\Pharmacy\HeparinInfusions\heparin LOW INTENSITY nomogram for [facility] [id number]    09/04/20 0132     heparin 25,000 units in dextrose 5% (100 units/ml) IV bolus from bag - ADDITIONAL PRN BOLUS - 30 units/kg (max bolus 4000 units)  As needed (PRN)     Question:  Heparin Infusion Adjustment (DO NOT MODIFY ANSWER)  Answer:  [email or web address]\Images\Pharmacy\HeparinInfusions\heparin LOW INTENSITY nomogram for [facility] [id number].pdf    09/04/20 0132     heparin 25,000 units in dextrose 5% (100 units/ml) IV bolus from bag - ADDITIONAL PRN BOLUS - 60 units/kg (max bolus 4000 units)  As needed (PRN)     Question:  Heparin Infusion Adjustment (DO NOT  MODIFY ANSWER)  Answer:  [email or web address]\Images\Pharmacy\HeparinInfusions\heparin LOW INTENSITY nomogram for [facility] [id number]    09/04/20 0132     Place RAFI hose  Until discontinued      09/04/20 0140     IP VTE HIGH RISK PATIENT  Once      09/04/20 0140     Place sequential compression device  Until discontinued      09/04/20 0140                Kiel Rey MD  Cardiology  [facility]    Critical Care Time:  35 minutes     Critical care was time spent personally by me on the following activities: development of treatment plan with patient or surrogate and bedside caregivers, discussions with consultants, evaluation of patient's response to treatment, examination of patient, ordering and performing treatments and interventions, ordering and review of laboratory studies, ordering and review of radiographic studies, pulse oximetry, re-evaluation of patient's condition. This critical care time did not overlap with that of any other provider or involve time for any procedures.

## 2020-09-05 NOTE — ASSESSMENT & PLAN NOTE
He cannot remember what pill he takes for this  Uncontrolled with hyperglycemia.  Continue diabetic diet and insulin sliding scale.

## 2020-09-05 NOTE — PROGRESS NOTES
"[facility] Hospital Medicine  Progress Note    Patient Name: Bc Garcia  MRN: [id number]  Patient Class: IP- Inpatient   Admission Date: 9/3/2020  Length of Stay: 2 days  Attending Physician: Brian Marino MD  Primary Care Provider: Primary Doctor No        Subjective:     Principal Problem:NSTEMI (non-ST elevated myocardial infarction)        HPI:  Mr. Garcia is a 69yo man with a past medical history of HLD, tobacco abuse (smoked 2 ppd x 20 years, quit 95), obesity, HTN and DM2.  He does see a local cardiologist in [place], [place].  He tells me he had an echo and nuclear stress test in May of this year.  The echo was normal, as was the nuclear stress test, "other than one little red hot-spot he was worried about."  He never underwent a LHC.    He was in his regular state of health until 6 pm yesterday when he developed substernal, burning chest pain while walking his dog.  This lasted about 1 hour, then eased off.  After arriving home, he was lifting an ice chest and developed the same pain again.  The pain radiated up into both shoulders.  This lasted until he finally went to bed later that evening.  He awoke at 10am and felt better.     By mid-day his home generator went out and he started to become overheated.  This precipitated a second event, similar to yesterday's.  This time the pain was a 10/10, substernal, and again burning.  He states he has had bad GERD before, but that this was nothing like that sensation.  He does deny SOB, nausea and associated diaphoresis.  He was unable to lie flat, as the pain worsened, then improved with sitting up.      In the ED at [facility], he was treated with ASA 325mg,  1" NTP to , morphine 4mg iv, IV heparin infusion, and zofran 4mg iv.  Outside labs revealed a troponin of 0.36, MB 7.5 in the ED.  CXR was normal.    Currently he is still having CP, stated as 4/10.    Overview/Hospital Course:  69 y/o male admitted to ICU " with NSTEMI.  Placed on NTG and Heparin drips.  Cardiology consulted.  Patient underwent LHC showing 3 vessel CAD.  Cardiology discussed case with CTS at [facility] with plans to transfer over there for CTS evaluation.  No further chest pain and hemodynamically stable.    Interval History: No issues overnight.    Review of Systems   HENT: Negative for ear discharge and ear pain.    Eyes: Negative for discharge and itching.   Endocrine: Negative for cold intolerance and heat intolerance.   Neurological: Negative for seizures and syncope.     Objective:     Vital Signs (Most Recent):  Temp: 98.7 °F (37.1 °C) (09/05/20 0330)  Pulse: 65 (09/05/20 0915)  Resp: 14 (09/05/20 0915)  BP: (!) 142/66 (09/05/20 0900)  SpO2: 97 % (09/05/20 0915) Vital Signs (24h Range):  Temp:  [97.7 °F (36.5 °C)-98.7 °F (37.1 °C)] 98.7 °F (37.1 °C)  Pulse:  [61-81] 65  Resp:  [12-35] 14  SpO2:  [91 %-100 %] 97 %  BP: ()/() 142/66     Weight: 118.4 kg (261 lb 0.4 oz)  Body mass index is 38.55 kg/m².    Intake/Output Summary (Last 24 hours) at 9/5/2020 0926  Last data filed at 9/5/2020 0600  Gross per 24 hour   Intake 2171.63 ml   Output 1800 ml   Net 371.63 ml      Physical Exam  Vitals signs and nursing note reviewed.   Constitutional:       General: He is not in acute distress.     Appearance: He is well-developed. He is morbidly obese. He is not ill-appearing, toxic-appearing or diaphoretic.   HENT:      Head: Normocephalic and atraumatic.      Nose: Nose normal.      Mouth/Throat:      Pharynx: No oropharyngeal exudate.   Eyes:      General: No scleral icterus.        Right eye: No discharge.         Left eye: No discharge.      Conjunctiva/sclera: Conjunctivae normal.      Pupils: Pupils are equal, round, and reactive to light.   Neck:      Musculoskeletal: Normal range of motion and neck supple.      Thyroid: No thyromegaly.      Vascular: No JVD.      Trachea: No tracheal deviation.   Cardiovascular:      Rate and Rhythm:  Normal rate and regular rhythm.      Heart sounds: Normal heart sounds. No murmur. No friction rub. No gallop.    Pulmonary:      Effort: Pulmonary effort is normal. No respiratory distress.      Breath sounds: Normal breath sounds. No stridor. No decreased breath sounds, wheezing, rhonchi or rales.   Chest:      Chest wall: No tenderness.   Abdominal:      General: Bowel sounds are normal. There is no distension.      Palpations: Abdomen is soft. There is no mass.      Tenderness: There is no abdominal tenderness. There is no guarding or rebound.      Comments: Truncal obesity   Genitourinary:     Comments: No landers in place  Musculoskeletal: Normal range of motion.         General: No tenderness.   Lymphadenopathy:      Cervical: No cervical adenopathy.      Comments: No peripheral edema   Skin:     General: Skin is warm and dry.      Coloration: Skin is not pale.      Findings: No erythema or rash.   Neurological:      Mental Status: He is alert and oriented to person, place, and time.      GCS: GCS eye subscore is 4. GCS verbal subscore is 5. GCS motor subscore is 6.      Cranial Nerves: No cranial nerve deficit.      Motor: No abnormal muscle tone.      Coordination: Coordination normal.      Deep Tendon Reflexes: Reflexes normal.   Psychiatric:         Behavior: Behavior normal.         Thought Content: Thought content normal.         Cognition and Memory: Cognition and memory normal.         Judgment: Judgment normal.         Significant Labs:   BMP:   Recent Labs   Lab 09/05/20  0304   *      K 3.9      CO2 25   BUN 9   CREATININE 0.8   CALCIUM 8.2*   MG 1.9     CBC:   Recent Labs   Lab 09/04/20  0159 09/05/20  0304   WBC 13.99* 11.55   HGB 14.1 13.2*   HCT 43.7 41.2    207       Significant Imaging: I have reviewed all pertinent imaging results/findings within the past 24 hours.      Assessment/Plan:      * NSTEMI (non-ST elevated myocardial infarction)  Admitted to ICU on NGT and  Heparin drips.  Cardiology consulted.  [facility] on 9/4 showing 3 vessel CAD.  Cardiology discussed case with CTS at [facility].  Awaiting transfer to [facility] for CTS evaluation.  On ASA, Statin, Plavix and Heparin drip.      Mixed hyperlipidemia  He is unsure what he takes for this at home  I started Lipitor high dose 80mg po qday, first now      Essential hypertension  Holding all anti-HTN meds except low dose BB for iv NTG      Uncontrolled type 2 diabetes mellitus without complication, without long-term current use of insulin  He cannot remember what pill he takes for this  Uncontrolled with hyperglycemia.  Continue diabetic diet and insulin sliding scale.      Obesity (BMI 35.0-39.9 without comorbidity)  Encourage weight loss        VTE Risk Mitigation (From admission, onward)         Ordered     heparin 25,000 units in dextrose 5% 250 mL (100 units/mL) infusion LOW INTENSITY nomogram - [facility]  Continuous     Question:  Heparin Infusion Adjustment (DO NOT MODIFY ANSWER)  Answer:  \\[email or web address]\epic\Images\Pharmacy\HeparinInfusions\heparin LOW INTENSITY nomogram for [facility] [id number].pdf    09/04/20 0132     heparin 25,000 units in dextrose 5% (100 units/ml) IV bolus from bag - ADDITIONAL PRN BOLUS - 30 units/kg (max bolus 4000 units)  As needed (PRN)     Question:  Heparin Infusion Adjustment (DO NOT MODIFY ANSWER)  Answer:  \\[email or web address]\epic\Images\Pharmacy\HeparinInfusions\heparin LOW INTENSITY nomogram for [facility] [id number].pdf    09/04/20 0132     heparin 25,000 units in dextrose 5% (100 units/ml) IV bolus from bag - ADDITIONAL PRN BOLUS - 60 units/kg (max bolus 4000 units)  As needed (PRN)     Question:  Heparin Infusion Adjustment (DO NOT MODIFY ANSWER)  Answer:  \\[email or web address]\epic\Images\Pharmacy\HeparinInfusions\heparin LOW INTENSITY nomogram for [facility] [id number].pdf    09/04/20 0132     Place RAFI hose  Until discontinued      09/04/20 0140     IP VTE HIGH RISK PATIENT  Once      09/04/20 0140     Place sequential compression  device  Until discontinued      09/04/20 0140                Discharge Planning   RESHMA:      Code Status: Full Code   Is the patient medically ready for discharge?:     Reason for patient still in hospital (select all that apply): Patient trending condition  Discharge Plan A: Home          Brian Marino MD  Department of Hospital Medicine   [facility]

## 2020-09-05 NOTE — ASSESSMENT & PLAN NOTE
Admitted to ICU on NGT and Heparin drips.  Cardiology consulted.  [facility] on 9/4 showing 3 vessel CAD.  Cardiology discussed case with CTS at [facility].  Awaiting transfer to [facility] for CTS evaluation.  On ASA, Statin, Plavix and Heparin drip.

## 2020-09-05 NOTE — SUBJECTIVE & OBJECTIVE
Interval History: No issues overnight.    Review of Systems   HENT: Negative for ear discharge and ear pain.    Eyes: Negative for discharge and itching.   Endocrine: Negative for cold intolerance and heat intolerance.   Neurological: Negative for seizures and syncope.     Objective:     Vital Signs (Most Recent):  Temp: 98.7 °F (37.1 °C) (09/05/20 0330)  Pulse: 65 (09/05/20 0915)  Resp: 14 (09/05/20 0915)  BP: (!) 142/66 (09/05/20 0900)  SpO2: 97 % (09/05/20 0915) Vital Signs (24h Range):  Temp:  [97.7 °F (36.5 °C)-98.7 °F (37.1 °C)] 98.7 °F (37.1 °C)  Pulse:  [61-81] 65  Resp:  [12-35] 14  SpO2:  [91 %-100 %] 97 %  BP: ()/() 142/66     Weight: 118.4 kg (261 lb 0.4 oz)  Body mass index is 38.55 kg/m².    Intake/Output Summary (Last 24 hours) at 9/5/2020 0926  Last data filed at 9/5/2020 0600  Gross per 24 hour   Intake 2171.63 ml   Output 1800 ml   Net 371.63 ml      Physical Exam  Vitals signs and nursing note reviewed.   Constitutional:       General: He is not in acute distress.     Appearance: He is well-developed. He is morbidly obese. He is not ill-appearing, toxic-appearing or diaphoretic.   HENT:      Head: Normocephalic and atraumatic.      Nose: Nose normal.      Mouth/Throat:      Pharynx: No oropharyngeal exudate.   Eyes:      General: No scleral icterus.        Right eye: No discharge.         Left eye: No discharge.      Conjunctiva/sclera: Conjunctivae normal.      Pupils: Pupils are equal, round, and reactive to light.   Neck:      Musculoskeletal: Normal range of motion and neck supple.      Thyroid: No thyromegaly.      Vascular: No JVD.      Trachea: No tracheal deviation.   Cardiovascular:      Rate and Rhythm: Normal rate and regular rhythm.      Heart sounds: Normal heart sounds. No murmur. No friction rub. No gallop.    Pulmonary:      Effort: Pulmonary effort is normal. No respiratory distress.      Breath sounds: Normal breath sounds. No stridor. No decreased breath sounds,  wheezing, rhonchi or rales.   Chest:      Chest wall: No tenderness.   Abdominal:      General: Bowel sounds are normal. There is no distension.      Palpations: Abdomen is soft. There is no mass.      Tenderness: There is no abdominal tenderness. There is no guarding or rebound.      Comments: Truncal obesity   Genitourinary:     Comments: No landers in place  Musculoskeletal: Normal range of motion.         General: No tenderness.   Lymphadenopathy:      Cervical: No cervical adenopathy.      Comments: No peripheral edema   Skin:     General: Skin is warm and dry.      Coloration: Skin is not pale.      Findings: No erythema or rash.   Neurological:      Mental Status: He is alert and oriented to person, place, and time.      GCS: GCS eye subscore is 4. GCS verbal subscore is 5. GCS motor subscore is 6.      Cranial Nerves: No cranial nerve deficit.      Motor: No abnormal muscle tone.      Coordination: Coordination normal.      Deep Tendon Reflexes: Reflexes normal.   Psychiatric:         Behavior: Behavior normal.         Thought Content: Thought content normal.         Cognition and Memory: Cognition and memory normal.         Judgment: Judgment normal.         Significant Labs:   BMP:   Recent Labs   Lab 09/05/20  0304   *      K 3.9      CO2 25   BUN 9   CREATININE 0.8   CALCIUM 8.2*   MG 1.9     CBC:   Recent Labs   Lab 09/04/20  0159 09/05/20  0304   WBC 13.99* 11.55   HGB 14.1 13.2*   HCT 43.7 41.2    207       Significant Imaging: I have reviewed all pertinent imaging results/findings within the past 24 hours.

## 2020-09-05 NOTE — PROGRESS NOTES
[facility]  ICU Shift Summary  Date: 9/5/2020      COVID Test: (--)  Isolation: No active isolations     Prehospitalization: Home  Admit Date / LOS : 9/3/2020/ 2 days    Diagnosis: NSTEMI (non-ST elevated myocardial infarction)    Consults:        Active: Cardio       Needed: N/A     Code Status: Full Code   Advanced Directive: <no information>    LDA: PIV       Central Lines/Site/Justification:Patient Does Not Have Central Line       Urinary Cath/Order/Justification:Patient Does Not Have Urinary Catheter    Vasopressors/Infusions:    heparin (porcine) in D5W 16.036 Units/kg/hr (09/05/20 1500)          GOALS: Volume/ Hemodynamic: N/A                     RASS: N/A    Pain Management: none       Pain Controlled: yes     Rhythm: NSR    Respiratory Device: Nasal Cannula                  Most Recent SBT/ SAT: Does not meet criteria       MOVE Screen: PT Consult    VTE Prophylaxis: Pharm  Mobility: Ambulatory  Stress Ulcer Prophylaxis: no...needed    Dietary: PO  Tolerance: yes  /  Advancement: @ goal    I & O (24h):    Intake/Output Summary (Last 24 hours) at 9/5/2020 1731  Last data filed at 9/5/2020 1500  Gross per 24 hour   Intake 1616.8 ml   Output 1150 ml   Net 466.8 ml        Restraints: No    Significant Dates:  Post Op Date: N/A  Rescue Date: N/A  Imaging/ Diagnostics: N/A    Noteworthy Labs:  none    CBC/Anemia Labs: Coags:    Recent Labs   Lab 09/04/20 0159 09/05/20  0304   WBC 13.99* 11.55   HGB 14.1 13.2*   HCT 43.7 41.2    207   MCV 89 88   RDW 13.2 13.2    Recent Labs   Lab 09/04/20 0159 09/04/20 2035 09/05/20  0304   INR 1.0  --   --   --    APTT 31.9   < > 56.0* 53.2*    < > = values in this interval not displayed.        Chemistries:   Recent Labs   Lab 09/04/20 0159 09/05/20  0304    137   K 4.2 3.9    104   CO2 29 25   BUN 13 9   CREATININE 0.9 0.8   CALCIUM 8.3* 8.2*   PROT 6.7  --    BILITOT 0.8  --    ALKPHOS 92  --    ALT 20  --    AST 43*  --    MG 2.0  1.9   PHOS 3.5 2.9        Cardiac Enzymes: Ejection Fractions:    Recent Labs     09/04/20  0159 09/04/20  0745   TROPONINI 4.814* 9.943*    No results found for: EF     POCT Glucose: HbA1c:    Recent Labs   Lab 09/05/20  0636 09/05/20  1302 09/05/20  1711   POCTGLUCOSE 183* 254* 139*    No results found for: HGBA1C        ICU LOS 1d 17h  Level of Care: RRC Transfer    Shift Summary/Plan for the shift: Pt awaiting bed at [facility] for further cardiac intervention. Heparin gtt continues. Chest pain free. Remains free from fall, injury, or breakdown throughout shift.

## 2020-09-05 NOTE — PLAN OF CARE
Patient remains in ICU overnight. AAOx4, afebrile, VSS, NSR with 1st degree AV block on cardiac monitor. 4 L NC while asleep. No complaints of CP. R radial cath site CDI, no signs of bleeding or hematoma. All pulses palpable. One BM, UO adequate. Pt ambulated in room to commode with minimal assist. No falls, injury, or skin breakdown this shift. Plan of care reviewed. Pending transfer to [facility] for cardiothoracic surgery.

## 2020-09-06 LAB
ABO + RH BLD: NORMAL
ALBUMIN SERPL BCP-MCNC: 2.9 G/DL (ref 3.5–5.2)
ALP SERPL-CCNC: 95 U/L (ref 55–135)
ALT SERPL W/O P-5'-P-CCNC: 38 U/L (ref 10–44)
ANION GAP SERPL CALC-SCNC: 11 MMOL/L (ref 8–16)
ANION GAP SERPL CALC-SCNC: 9 MMOL/L (ref 8–16)
APTT BLDCRRT: 40.3 SEC (ref 21–32)
APTT BLDCRRT: 53.9 SEC (ref 21–32)
AST SERPL-CCNC: 54 U/L (ref 10–40)
BACTERIA #/AREA URNS AUTO: NORMAL /HPF
BASOPHILS # BLD AUTO: 0.08 K/UL (ref 0–0.2)
BASOPHILS NFR BLD: 0.7 % (ref 0–1.9)
BILIRUB SERPL-MCNC: 0.4 MG/DL (ref 0.1–1)
BILIRUB UR QL STRIP: NEGATIVE
BLD GP AB SCN CELLS X3 SERPL QL: NORMAL
BUN SERPL-MCNC: 12 MG/DL (ref 8–23)
BUN SERPL-MCNC: 12 MG/DL (ref 8–23)
CALCIUM SERPL-MCNC: 8.1 MG/DL (ref 8.7–10.5)
CALCIUM SERPL-MCNC: 8.3 MG/DL (ref 8.7–10.5)
CHLORIDE SERPL-SCNC: 104 MMOL/L (ref 95–110)
CHLORIDE SERPL-SCNC: 105 MMOL/L (ref 95–110)
CLARITY UR REFRACT.AUTO: CLEAR
CO2 SERPL-SCNC: 21 MMOL/L (ref 23–29)
CO2 SERPL-SCNC: 22 MMOL/L (ref 23–29)
COLOR UR AUTO: YELLOW
CREAT SERPL-MCNC: 0.8 MG/DL (ref 0.5–1.4)
CREAT SERPL-MCNC: 0.8 MG/DL (ref 0.5–1.4)
DIFFERENTIAL METHOD: ABNORMAL
EOSINOPHIL # BLD AUTO: 0.8 K/UL (ref 0–0.5)
EOSINOPHIL NFR BLD: 7.2 % (ref 0–8)
ERYTHROCYTE [DISTWIDTH] IN BLOOD BY AUTOMATED COUNT: 13.2 % (ref 11.5–14.5)
EST. GFR  (AFRICAN AMERICAN): >60 ML/MIN/1.73 M^2
EST. GFR  (AFRICAN AMERICAN): >60 ML/MIN/1.73 M^2
EST. GFR  (NON AFRICAN AMERICAN): >60 ML/MIN/1.73 M^2
EST. GFR  (NON AFRICAN AMERICAN): >60 ML/MIN/1.73 M^2
ESTIMATED AVG GLUCOSE: 223 MG/DL (ref 68–131)
GLUCOSE SERPL-MCNC: 175 MG/DL (ref 70–110)
GLUCOSE SERPL-MCNC: 177 MG/DL (ref 70–110)
GLUCOSE UR QL STRIP: ABNORMAL
HBA1C MFR BLD HPLC: 9.4 % (ref 4–5.6)
HCT VFR BLD AUTO: 40.4 % (ref 40–54)
HGB BLD-MCNC: 12.4 G/DL (ref 14–18)
HGB UR QL STRIP: NEGATIVE
IMM GRANULOCYTES # BLD AUTO: 0.05 K/UL (ref 0–0.04)
IMM GRANULOCYTES NFR BLD AUTO: 0.5 % (ref 0–0.5)
KETONES UR QL STRIP: NEGATIVE
LEUKOCYTE ESTERASE UR QL STRIP: NEGATIVE
LYMPHOCYTES # BLD AUTO: 4.1 K/UL (ref 1–4.8)
LYMPHOCYTES NFR BLD: 37 % (ref 18–48)
MAGNESIUM SERPL-MCNC: 1.8 MG/DL (ref 1.6–2.6)
MAGNESIUM SERPL-MCNC: 1.8 MG/DL (ref 1.6–2.6)
MCH RBC QN AUTO: 28 PG (ref 27–31)
MCHC RBC AUTO-ENTMCNC: 30.7 G/DL (ref 32–36)
MCV RBC AUTO: 91 FL (ref 82–98)
MICROSCOPIC COMMENT: NORMAL
MONOCYTES # BLD AUTO: 0.9 K/UL (ref 0.3–1)
MONOCYTES NFR BLD: 8.3 % (ref 4–15)
NEUTROPHILS # BLD AUTO: 5.1 K/UL (ref 1.8–7.7)
NEUTROPHILS NFR BLD: 46.3 % (ref 38–73)
NITRITE UR QL STRIP: NEGATIVE
NRBC BLD-RTO: 0 /100 WBC
PH UR STRIP: 6 [PH] (ref 5–8)
PHOSPHATE SERPL-MCNC: 3 MG/DL (ref 2.7–4.5)
PHOSPHATE SERPL-MCNC: 3.1 MG/DL (ref 2.7–4.5)
PLATELET # BLD AUTO: 202 K/UL (ref 150–350)
PMV BLD AUTO: 11.9 FL (ref 9.2–12.9)
POCT GLUCOSE: 168 MG/DL (ref 70–110)
POCT GLUCOSE: 177 MG/DL (ref 70–110)
POCT GLUCOSE: 198 MG/DL (ref 70–110)
POCT GLUCOSE: 213 MG/DL (ref 70–110)
POCT GLUCOSE: 217 MG/DL (ref 70–110)
POTASSIUM SERPL-SCNC: 3.9 MMOL/L (ref 3.5–5.1)
POTASSIUM SERPL-SCNC: 4.4 MMOL/L (ref 3.5–5.1)
PROT SERPL-MCNC: 6.2 G/DL (ref 6–8.4)
PROT UR QL STRIP: NEGATIVE
RBC # BLD AUTO: 4.43 M/UL (ref 4.6–6.2)
SODIUM SERPL-SCNC: 136 MMOL/L (ref 136–145)
SODIUM SERPL-SCNC: 136 MMOL/L (ref 136–145)
SP GR UR STRIP: 1.01 (ref 1–1.03)
TROPONIN I SERPL DL<=0.01 NG/ML-MCNC: 2.59 NG/ML (ref 0–0.03)
URN SPEC COLLECT METH UR: ABNORMAL
WBC # BLD AUTO: 11.06 K/UL (ref 3.9–12.7)
YEAST UR QL AUTO: NORMAL

## 2020-09-06 PROCEDURE — 80053 COMPREHEN METABOLIC PANEL: CPT

## 2020-09-06 PROCEDURE — 83735 ASSAY OF MAGNESIUM: CPT

## 2020-09-06 PROCEDURE — 25000003 PHARM REV CODE 250: Performed by: STUDENT IN AN ORGANIZED HEALTH CARE EDUCATION/TRAINING PROGRAM

## 2020-09-06 PROCEDURE — 25000242 PHARM REV CODE 250 ALT 637 W/ HCPCS: Performed by: INTERNAL MEDICINE

## 2020-09-06 PROCEDURE — 80048 BASIC METABOLIC PNL TOTAL CA: CPT

## 2020-09-06 PROCEDURE — 86850 RBC ANTIBODY SCREEN: CPT

## 2020-09-06 PROCEDURE — 63600175 PHARM REV CODE 636 W HCPCS: Performed by: HOSPITALIST

## 2020-09-06 PROCEDURE — 85730 THROMBOPLASTIN TIME PARTIAL: CPT | Mod: 91

## 2020-09-06 PROCEDURE — 85025 COMPLETE CBC W/AUTO DIFF WBC: CPT

## 2020-09-06 PROCEDURE — 84484 ASSAY OF TROPONIN QUANT: CPT

## 2020-09-06 PROCEDURE — 36415 COLL VENOUS BLD VENIPUNCTURE: CPT

## 2020-09-06 PROCEDURE — 81001 URINALYSIS AUTO W/SCOPE: CPT

## 2020-09-06 PROCEDURE — 63600175 PHARM REV CODE 636 W HCPCS: Performed by: STUDENT IN AN ORGANIZED HEALTH CARE EDUCATION/TRAINING PROGRAM

## 2020-09-06 PROCEDURE — 25000003 PHARM REV CODE 250: Performed by: HOSPITALIST

## 2020-09-06 PROCEDURE — 83036 HEMOGLOBIN GLYCOSYLATED A1C: CPT

## 2020-09-06 PROCEDURE — 85730 THROMBOPLASTIN TIME PARTIAL: CPT

## 2020-09-06 PROCEDURE — 20000000 HC ICU ROOM

## 2020-09-06 PROCEDURE — 83735 ASSAY OF MAGNESIUM: CPT | Mod: 91

## 2020-09-06 PROCEDURE — 84100 ASSAY OF PHOSPHORUS: CPT | Mod: 91

## 2020-09-06 PROCEDURE — 84100 ASSAY OF PHOSPHORUS: CPT

## 2020-09-06 RX ORDER — MAGNESIUM SULFATE HEPTAHYDRATE 40 MG/ML
4 INJECTION, SOLUTION INTRAVENOUS
Status: DISCONTINUED | OUTPATIENT
Start: 2020-09-06 | End: 2020-09-06

## 2020-09-06 RX ORDER — POTASSIUM CHLORIDE 29.8 MG/ML
80 INJECTION INTRAVENOUS
Status: DISCONTINUED | OUTPATIENT
Start: 2020-09-06 | End: 2020-09-06

## 2020-09-06 RX ORDER — NITROGLYCERIN 20 MG/100ML
5 INJECTION INTRAVENOUS CONTINUOUS
Status: DISCONTINUED | OUTPATIENT
Start: 2020-09-06 | End: 2020-09-09

## 2020-09-06 RX ORDER — HEPARIN SODIUM,PORCINE/D5W 25000/250
14 INTRAVENOUS SOLUTION INTRAVENOUS CONTINUOUS
Status: DISCONTINUED | OUTPATIENT
Start: 2020-09-06 | End: 2020-09-09

## 2020-09-06 RX ORDER — POTASSIUM CHLORIDE 29.8 MG/ML
40 INJECTION INTRAVENOUS
Status: DISCONTINUED | OUTPATIENT
Start: 2020-09-06 | End: 2020-09-06

## 2020-09-06 RX ORDER — NITROGLYCERIN 0.4 MG/1
0.4 TABLET SUBLINGUAL EVERY 5 MIN PRN
Status: DISCONTINUED | OUTPATIENT
Start: 2020-09-06 | End: 2020-09-14

## 2020-09-06 RX ORDER — POTASSIUM CHLORIDE 14.9 MG/ML
60 INJECTION INTRAVENOUS
Status: DISCONTINUED | OUTPATIENT
Start: 2020-09-06 | End: 2020-09-06

## 2020-09-06 RX ORDER — MAGNESIUM SULFATE HEPTAHYDRATE 40 MG/ML
2 INJECTION, SOLUTION INTRAVENOUS
Status: DISCONTINUED | OUTPATIENT
Start: 2020-09-06 | End: 2020-09-06

## 2020-09-06 RX ORDER — MAGNESIUM SULFATE HEPTAHYDRATE 40 MG/ML
2 INJECTION, SOLUTION INTRAVENOUS ONCE
Status: COMPLETED | OUTPATIENT
Start: 2020-09-06 | End: 2020-09-06

## 2020-09-06 RX ADMIN — NITROGLYCERIN 5 MCG/MIN: 20 INJECTION INTRAVENOUS at 12:09

## 2020-09-06 RX ADMIN — NITROGLYCERIN 0.4 MG: 0.4 TABLET, ORALLY DISINTEGRATING SUBLINGUAL at 12:09

## 2020-09-06 RX ADMIN — INSULIN ASPART 2 UNITS: 100 INJECTION, SOLUTION INTRAVENOUS; SUBCUTANEOUS at 05:09

## 2020-09-06 RX ADMIN — HEPARIN SODIUM AND DEXTROSE 16 UNITS/KG/HR: 10000; 5 INJECTION INTRAVENOUS at 05:09

## 2020-09-06 RX ADMIN — NITROGLYCERIN 0.4 MG: 0.4 TABLET, ORALLY DISINTEGRATING SUBLINGUAL at 03:09

## 2020-09-06 RX ADMIN — HEPARIN SODIUM AND DEXTROSE 16 UNITS/KG/HR: 10000; 5 INJECTION INTRAVENOUS at 10:09

## 2020-09-06 RX ADMIN — METOPROLOL TARTRATE 12.5 MG: 25 TABLET, FILM COATED ORAL at 09:09

## 2020-09-06 RX ADMIN — INSULIN ASPART 2 UNITS: 100 INJECTION, SOLUTION INTRAVENOUS; SUBCUTANEOUS at 12:09

## 2020-09-06 RX ADMIN — ASPIRIN 81 MG CHEWABLE TABLET 81 MG: 81 TABLET CHEWABLE at 08:09

## 2020-09-06 RX ADMIN — MAGNESIUM SULFATE IN WATER 2 G: 40 INJECTION, SOLUTION INTRAVENOUS at 06:09

## 2020-09-06 RX ADMIN — ATORVASTATIN CALCIUM 80 MG: 40 TABLET, FILM COATED ORAL at 09:09

## 2020-09-06 RX ADMIN — ACETAMINOPHEN 650 MG: 325 TABLET ORAL at 05:09

## 2020-09-06 NOTE — NURSING
Pt. Admitted to SICU from OSH. Pt. Afebrile. NORA VARELA at the bedside. Oxygenating well on 2L NC. Denies chest pain. Labs sent. AAOx4.    Heparin drip infusing at 16 units/kg/hr.     Voids to urinal.    Skin: no breakdown noted to sacrum, heels, or elbows. Foam in place.

## 2020-09-06 NOTE — H&P
[facility]  Critical Care - Surgery  History & Physical    Patient Name: cB Garcia  MRN: [id number]  Admission Date: 9/3/2020  Code Status: Full Code  Attending Physician: Ej Vazquez MD   Primary Care Provider: Primary Doctor No   Principal Problem: NSTEMI (non-ST elevated myocardial infarction)    Subjective:     HPI:  70M who presented with NSTEMI to OSH. Symptoms began, and he presented on 9/3. No previous episodes of chest pain.    L heart cath:  · LVEDP (Pre): 25  · Estimated blood loss: <50 mL  · Three vessel coronary artery disease. Left Main disease.     Left main:  Left main has a 70% stenosis.  Distal left main has a aneurysmal dilatation extending into ostial circumflex.     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  OM2 is the culprit vessel for his non STEMI.  Proximal OM2 severe 90% stenosis     RCA:  Non dominant:  Proximal 50%, mid 70% stenosis.    Arrives on hep gtt, SL nitro prn. ASA, statin, plavix, metoprolol.    Hx of DM, HLD, HTN.    No previous surgeries.    No allergies.    Hospital/ICU Course:  No notes on file    Follow-up For: Procedure(s) (LRB):  Left heart cath, radial, 9 am (Left)    Post-Operative Day: 2 Days Post-Op     Past Medical History:   Diagnosis Date    Diabetes mellitus     Hyperlipidemia     Hypertension     Morbid obesity due to excess calories        Past Surgical History:   Procedure Laterality Date    LEFT HEART CATHETERIZATION Left 9/4/2020    Procedure: Left heart cath, radial, 9 am;  Surgeon: Kiel Rey MD;  Location: [facility] CATH LAB;  Service: Cardiology;  Laterality: Left;    left knee skin excision Left        Review of patient's allergies indicates:  No Known Allergies    Family History     Problem Relation (Age of Onset)    Cancer Father    Diabetes Paternal Grandmother    Stroke Mother        Tobacco  Use    Smoking status: Former Smoker     Packs/day: 2.00     Years: 20.00     Pack years: 40.00     Types: Cigarettes     Quit date: 1995     Years since quittin.0   Substance and Sexual Activity    Alcohol use: Not Currently     Comment: Quit in     Drug use: Never    Sexual activity: Not on file      Review of Systems   Constitutional: Negative.    HENT: Negative.    Eyes: Negative.    Respiratory: Negative.  Negative for shortness of breath.    Cardiovascular: Positive for chest pain.   Gastrointestinal: Negative.    Endocrine: Negative.    Genitourinary: Negative.  Negative for dysuria.   Musculoskeletal: Negative.    Skin: Negative.    Allergic/Immunologic: Negative.    Neurological: Negative.    Hematological: Negative.    Psychiatric/Behavioral: Negative.      Objective:     Vital Signs (Most Recent):  Temp: 98.1 °F (36.7 °C) (20 0415)  Pulse: (!) 59 (20 0615)  Resp: 13 (20 0615)  BP: (!) 146/65 (20 0600)  SpO2: 99 % (20 0615) Vital Signs (24h Range):  Temp:  [98.1 °F (36.7 °C)-98.8 °F (37.1 °C)] 98.1 °F (36.7 °C)  Pulse:  [58-78] 59  Resp:  [10-35] 13  SpO2:  [93 %-100 %] 99 %  BP: (110-189)/(53-84) 146/65     Weight: 125 kg (275 lb 9.2 oz)  Body mass index is 40.7 kg/m².      Intake/Output Summary (Last 24 hours) at 2020 0644  Last data filed at 2020 0500  Gross per 24 hour   Intake 463.4 ml   Output 300 ml   Net 163.4 ml       Physical Exam  Vitals signs reviewed.   Constitutional:       Appearance: Normal appearance.   Cardiovascular:      Rate and Rhythm: Normal rate and regular rhythm.      Pulses: Normal pulses.   Pulmonary:      Effort: Pulmonary effort is normal. No respiratory distress.   Abdominal:      General: There is no distension.      Tenderness: There is no abdominal tenderness.      Comments: Obese  No scars   Skin:     General: Skin is warm and dry.      Capillary Refill: Capillary refill takes less than 2 seconds.   Neurological:       General: No focal deficit present.      Mental Status: He is alert.   Psychiatric:         Mood and Affect: Mood normal.         Vents:       Lines/Drains/Airways     Peripheral Intravenous Line                 Peripheral IV - Single Lumen 09/04/20 1200 20 G Anterior;Left Hand 1 day         Peripheral IV - Single Lumen 09/04/20 1200 20 G Anterior;Proximal;Right Forearm 1 day         Peripheral IV - Single Lumen 09/04/20 1200 20 G Anterior;Right Hand 1 day                Significant Labs:    CBC/Anemia Profile:  Recent Labs   Lab 09/05/20  0304 09/06/20  0430   WBC 11.55 11.06   HGB 13.2* 12.4*   HCT 41.2 40.4    202   MCV 88 91   RDW 13.2 13.2        Chemistries:  Recent Labs   Lab 09/05/20  0304 09/06/20  0249 09/06/20  0430    136 136   K 3.9 3.9 4.4    104 105   CO2 25 21* 22*   BUN 9 12 12   CREATININE 0.8 0.8 0.8   CALCIUM 8.2* 8.3* 8.1*   ALBUMIN  --   --  2.9*   PROT  --   --  6.2   BILITOT  --   --  0.4   ALKPHOS  --   --  95   ALT  --   --  38   AST  --   --  54*   MG 1.9 1.8 1.8   PHOS 2.9 3.0 3.1       All pertinent labs within the past 24 hours have been reviewed.    Significant Imaging: I have reviewed all pertinent imaging results/findings within the past 24 hours.    Assessment/Plan:     * NSTEMI (non-ST elevated myocardial infarction)  70M with NSTEMI and hx of DM, HTN, HLD presents for CABG evaluation.    Neuro:  Alert and oriented  Pain meds prn    CV:  Hep gtt  Metoprolol  ASA  Statin  Stop plavix  Pending carotid US  Planning CABG 9/8    Pulm:   RA  Pending PFT    Renal:  Normal BUN/Cr  No Mason    GI:   Diabetic diet    Heme/ID:  Hgb OK, WBC wnl  COVID tomorrow for OR    Endo:  Insulin prn    Dispo: Continue [place] for CABG; OR on 9/8; supportive care            Critical secondary to Patient has a condition that poses threat to life and bodily function: NSTEMI     Critical care was time spent personally by me on the following activities: development of treatment plan with patient  or surrogate and bedside caregivers, discussions with consultants, evaluation of patient's response to treatment, examination of patient, ordering and performing treatments and interventions, ordering and review of laboratory studies, ordering and review of radiographic studies, pulse oximetry, re-evaluation of patient's condition.  This critical care time did not overlap with that of any other provider or involve time for any procedures.     KEVIN Judd MD  Critical Care - Surgery  [facility]

## 2020-09-06 NOTE — HPI
70M who presented with NSTEMI to OSH. Symptoms began, and he presented on 9/3. No previous episodes of chest pain.    L heart cath:  · LVEDP (Pre): 25  · Estimated blood loss: <50 mL  · Three vessel coronary artery disease. Left Main disease.     Left main:  Left main has a 70% stenosis.  Distal left main has a aneurysmal dilatation extending into ostial circumflex.     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  OM2 is the culprit vessel for his non STEMI.  Proximal OM2 severe 90% stenosis     RCA:  Non dominant:  Proximal 50%, mid 70% stenosis.    Arrives on hep gtt, SL nitro prn. ASA, statin, plavix, metoprolol.    Hx of DM, HLD, HTN.    No previous surgeries.    No allergies.

## 2020-09-06 NOTE — NURSING TRANSFER
Nursing Transfer Note      9/6/2020     [place] [facility]     Transfer via stretcher    Transfer with 3L NC to O2, cardiac monitor    Transported by EMS    Medicines sent: heparin, SL nitro    Chart send with patient: Yes    Notified: daughter, per patient    Patient reassessed at: 9/6/2020, 0301 (date, time)

## 2020-09-06 NOTE — SUBJECTIVE & OBJECTIVE
Follow-up For: Procedure(s) (LRB):  Left heart cath, radial, 9 am (Left)    Post-Operative Day: 2 Days Post-Op     Past Medical History:   Diagnosis Date    Diabetes mellitus     Hyperlipidemia     Hypertension     Morbid obesity due to excess calories        Past Surgical History:   Procedure Laterality Date    LEFT HEART CATHETERIZATION Left 2020    Procedure: Left heart cath, radial, 9 am;  Surgeon: Kiel Rey MD;  Location: [facility] CATH LAB;  Service: Cardiology;  Laterality: Left;    left knee skin excision Left        Review of patient's allergies indicates:  No Known Allergies    Family History     Problem Relation (Age of Onset)    Cancer Father    Diabetes Paternal Grandmother    Stroke Mother        Tobacco Use    Smoking status: Former Smoker     Packs/day: 2.00     Years: 20.00     Pack years: 40.00     Types: Cigarettes     Quit date: 1995     Years since quittin.0   Substance and Sexual Activity    Alcohol use: Not Currently     Comment: Quit in     Drug use: Never    Sexual activity: Not on file      Review of Systems   Constitutional: Negative.    HENT: Negative.    Eyes: Negative.    Respiratory: Negative.  Negative for shortness of breath.    Cardiovascular: Positive for chest pain.   Gastrointestinal: Negative.    Endocrine: Negative.    Genitourinary: Negative.  Negative for dysuria.   Musculoskeletal: Negative.    Skin: Negative.    Allergic/Immunologic: Negative.    Neurological: Negative.    Hematological: Negative.    Psychiatric/Behavioral: Negative.      Objective:     Vital Signs (Most Recent):  Temp: 98.1 °F (36.7 °C) (20 0415)  Pulse: (!) 59 (20 0615)  Resp: 13 (20 0615)  BP: (!) 146/65 (20 0600)  SpO2: 99 % (20 0615) Vital Signs (24h Range):  Temp:  [98.1 °F (36.7 °C)-98.8 °F (37.1 °C)] 98.1 °F (36.7 °C)  Pulse:  [58-78] 59  Resp:  [10-35] 13  SpO2:  [93 %-100 %] 99 %  BP: (110-189)/(53-84) 146/65     Weight: 125 kg (275 lb 9.2  oz)  Body mass index is 40.7 kg/m².      Intake/Output Summary (Last 24 hours) at 9/6/2020 0644  Last data filed at 9/6/2020 0500  Gross per 24 hour   Intake 463.4 ml   Output 300 ml   Net 163.4 ml       Physical Exam  Vitals signs reviewed.   Constitutional:       Appearance: Normal appearance.   Cardiovascular:      Rate and Rhythm: Normal rate and regular rhythm.      Pulses: Normal pulses.   Pulmonary:      Effort: Pulmonary effort is normal. No respiratory distress.   Abdominal:      General: There is no distension.      Tenderness: There is no abdominal tenderness.      Comments: Obese  No scars   Skin:     General: Skin is warm and dry.      Capillary Refill: Capillary refill takes less than 2 seconds.   Neurological:      General: No focal deficit present.      Mental Status: He is alert.   Psychiatric:         Mood and Affect: Mood normal.         Vents:       Lines/Drains/Airways     Peripheral Intravenous Line                 Peripheral IV - Single Lumen 09/04/20 1200 20 G Anterior;Left Hand 1 day         Peripheral IV - Single Lumen 09/04/20 1200 20 G Anterior;Proximal;Right Forearm 1 day         Peripheral IV - Single Lumen 09/04/20 1200 20 G Anterior;Right Hand 1 day                Significant Labs:    CBC/Anemia Profile:  Recent Labs   Lab 09/05/20  0304 09/06/20  0430   WBC 11.55 11.06   HGB 13.2* 12.4*   HCT 41.2 40.4    202   MCV 88 91   RDW 13.2 13.2        Chemistries:  Recent Labs   Lab 09/05/20  0304 09/06/20  0249 09/06/20  0430    136 136   K 3.9 3.9 4.4    104 105   CO2 25 21* 22*   BUN 9 12 12   CREATININE 0.8 0.8 0.8   CALCIUM 8.2* 8.3* 8.1*   ALBUMIN  --   --  2.9*   PROT  --   --  6.2   BILITOT  --   --  0.4   ALKPHOS  --   --  95   ALT  --   --  38   AST  --   --  54*   MG 1.9 1.8 1.8   PHOS 2.9 3.0 3.1       All pertinent labs within the past 24 hours have been reviewed.    Significant Imaging: I have reviewed all pertinent imaging results/findings within the past 24  hours.

## 2020-09-06 NOTE — PLAN OF CARE
Plan of Care Note  Cardiothoracic Surgery    Bc Garcia is a 70 y.o. male with CAD and DM found to have L main disease    Specific issues:  --start nitro as pt is hypertensive and had some chest discomfort that responded to subligual nitro last night  --continue metop 12.5 bid, asa, and hep gtt  --replace electrolytes  --PFTs, carotid duplex, CXR, UA, covid test for OR Tuesday or Wednesday    Plan of care for patient was discussed with ICU staff including nurses, residents, and faculty and appropriate consulting services.    Will continue to monitor patient's hemodynamics, functionality, neuro status, fluid status and renal function, and labs and will adjust medications and fluids as necessary while monitoring appropriateness for de-escalation of support and monitoring and transport to stepdown unit.    Damian Fisher MD  Cardiothoracic Surgery Fellow

## 2020-09-06 NOTE — NURSING
0040: Pt with 6/10, mid sternal CP, radiating to bilateral shoulders. 12 lead EKG performed. MD Frias notified. MD ordered SL nitro.   0054: 0.4mg SL Nitro administered. 3 L NC.   0059: Pt rates CP 4/10. Administered 2nd dose SL nitro.   0104: Pt rates CP 0/10. Instructed to call this RN if anything changes.   0130: Pt sleeping comfortably in bed. VSS. Will continue to monitor.   0220: Pt assigned bed at [facility] SICU [id number]. Report called to Ailin Wheeler RN. Awaiting EMS.   0340: Report given to EMS. Pt left with EMS to [facility]. EZEQUIEL Parisi alerted.

## 2020-09-06 NOTE — ASSESSMENT & PLAN NOTE
70M with NSTEMI and hx of DM, HTN, HLD presents for CABG evaluation.    Neuro:  Alert and oriented  Pain meds prn    CV:  Hep gtt  Metoprolol  ASA  Statin  Stop plavix  Pending carotid US  Planning CABG 9/8    Pulm:   RA  Pending PFT    Renal:  Normal BUN/Cr  No Mason    GI:   Diabetic diet    Heme/ID:  Hgb OK, WBC wnl  COVID tomorrow for OR    Endo:  Insulin prn    Dispo: Continue [facility] for CABG; OR on 9/8; supportive care

## 2020-09-07 LAB
ANION GAP SERPL CALC-SCNC: 6 MMOL/L (ref 8–16)
APTT BLDCRRT: 49.7 SEC (ref 21–32)
BASOPHILS # BLD AUTO: 0.06 K/UL (ref 0–0.2)
BASOPHILS NFR BLD: 0.5 % (ref 0–1.9)
BUN SERPL-MCNC: 9 MG/DL (ref 8–23)
CALCIUM SERPL-MCNC: 8.2 MG/DL (ref 8.7–10.5)
CHLORIDE SERPL-SCNC: 103 MMOL/L (ref 95–110)
CO2 SERPL-SCNC: 26 MMOL/L (ref 23–29)
CREAT SERPL-MCNC: 0.8 MG/DL (ref 0.5–1.4)
DIFFERENTIAL METHOD: ABNORMAL
EOSINOPHIL # BLD AUTO: 0.7 K/UL (ref 0–0.5)
EOSINOPHIL NFR BLD: 6.2 % (ref 0–8)
ERYTHROCYTE [DISTWIDTH] IN BLOOD BY AUTOMATED COUNT: 13.2 % (ref 11.5–14.5)
EST. GFR  (AFRICAN AMERICAN): >60 ML/MIN/1.73 M^2
EST. GFR  (NON AFRICAN AMERICAN): >60 ML/MIN/1.73 M^2
GLUCOSE SERPL-MCNC: 166 MG/DL (ref 70–110)
HCT VFR BLD AUTO: 39.3 % (ref 40–54)
HGB BLD-MCNC: 12.4 G/DL (ref 14–18)
IMM GRANULOCYTES # BLD AUTO: 0.04 K/UL (ref 0–0.04)
IMM GRANULOCYTES NFR BLD AUTO: 0.3 % (ref 0–0.5)
LYMPHOCYTES # BLD AUTO: 2.8 K/UL (ref 1–4.8)
LYMPHOCYTES NFR BLD: 24.6 % (ref 18–48)
MAGNESIUM SERPL-MCNC: 1.8 MG/DL (ref 1.6–2.6)
MCH RBC QN AUTO: 28.2 PG (ref 27–31)
MCHC RBC AUTO-ENTMCNC: 31.6 G/DL (ref 32–36)
MCV RBC AUTO: 89 FL (ref 82–98)
MONOCYTES # BLD AUTO: 0.8 K/UL (ref 0.3–1)
MONOCYTES NFR BLD: 6.6 % (ref 4–15)
NEUTROPHILS # BLD AUTO: 7.1 K/UL (ref 1.8–7.7)
NEUTROPHILS NFR BLD: 61.8 % (ref 38–73)
NRBC BLD-RTO: 0 /100 WBC
PHOSPHATE SERPL-MCNC: 3.5 MG/DL (ref 2.7–4.5)
PLATELET # BLD AUTO: 215 K/UL (ref 150–350)
PMV BLD AUTO: 11.4 FL (ref 9.2–12.9)
POCT GLUCOSE: 167 MG/DL (ref 70–110)
POCT GLUCOSE: 182 MG/DL (ref 70–110)
POCT GLUCOSE: 189 MG/DL (ref 70–110)
POTASSIUM SERPL-SCNC: 3.6 MMOL/L (ref 3.5–5.1)
RBC # BLD AUTO: 4.4 M/UL (ref 4.6–6.2)
SODIUM SERPL-SCNC: 135 MMOL/L (ref 136–145)
WBC # BLD AUTO: 11.53 K/UL (ref 3.9–12.7)

## 2020-09-07 PROCEDURE — 94761 N-INVAS EAR/PLS OXIMETRY MLT: CPT

## 2020-09-07 PROCEDURE — 63600175 PHARM REV CODE 636 W HCPCS: Performed by: STUDENT IN AN ORGANIZED HEALTH CARE EDUCATION/TRAINING PROGRAM

## 2020-09-07 PROCEDURE — U0003 INFECTIOUS AGENT DETECTION BY NUCLEIC ACID (DNA OR RNA); SEVERE ACUTE RESPIRATORY SYNDROME CORONAVIRUS 2 (SARS-COV-2) (CORONAVIRUS DISEASE [COVID-19]), AMPLIFIED PROBE TECHNIQUE, MAKING USE OF HIGH THROUGHPUT TECHNOLOGIES AS DESCRIBED BY CMS-2020-01-R: HCPCS

## 2020-09-07 PROCEDURE — 99233 SBSQ HOSP IP/OBS HIGH 50: CPT | Mod: ,,, | Performed by: ANESTHESIOLOGY

## 2020-09-07 PROCEDURE — 80048 BASIC METABOLIC PNL TOTAL CA: CPT

## 2020-09-07 PROCEDURE — 25000003 PHARM REV CODE 250: Performed by: STUDENT IN AN ORGANIZED HEALTH CARE EDUCATION/TRAINING PROGRAM

## 2020-09-07 PROCEDURE — 83735 ASSAY OF MAGNESIUM: CPT

## 2020-09-07 PROCEDURE — 25000003 PHARM REV CODE 250: Performed by: HOSPITALIST

## 2020-09-07 PROCEDURE — 99233 PR SUBSEQUENT HOSPITAL CARE,LEVL III: ICD-10-PCS | Mod: ,,, | Performed by: ANESTHESIOLOGY

## 2020-09-07 PROCEDURE — 63600175 PHARM REV CODE 636 W HCPCS: Performed by: THORACIC SURGERY (CARDIOTHORACIC VASCULAR SURGERY)

## 2020-09-07 PROCEDURE — 85730 THROMBOPLASTIN TIME PARTIAL: CPT

## 2020-09-07 PROCEDURE — 20000000 HC ICU ROOM

## 2020-09-07 PROCEDURE — 84100 ASSAY OF PHOSPHORUS: CPT

## 2020-09-07 PROCEDURE — 85025 COMPLETE CBC W/AUTO DIFF WBC: CPT

## 2020-09-07 RX ORDER — SODIUM,POTASSIUM PHOSPHATES 280-250MG
2 POWDER IN PACKET (EA) ORAL
Status: DISCONTINUED | OUTPATIENT
Start: 2020-09-07 | End: 2020-09-09

## 2020-09-07 RX ORDER — HYDRALAZINE HYDROCHLORIDE 20 MG/ML
10 INJECTION INTRAMUSCULAR; INTRAVENOUS ONCE
Status: DISCONTINUED | OUTPATIENT
Start: 2020-09-07 | End: 2020-09-08

## 2020-09-07 RX ORDER — LANOLIN ALCOHOL/MO/W.PET/CERES
800 CREAM (GRAM) TOPICAL
Status: DISCONTINUED | OUTPATIENT
Start: 2020-09-07 | End: 2020-09-09

## 2020-09-07 RX ORDER — MORPHINE SULFATE 2 MG/ML
2 INJECTION, SOLUTION INTRAMUSCULAR; INTRAVENOUS
Status: DISCONTINUED | OUTPATIENT
Start: 2020-09-07 | End: 2020-09-09

## 2020-09-07 RX ORDER — HYDRALAZINE HYDROCHLORIDE 20 MG/ML
10 INJECTION INTRAMUSCULAR; INTRAVENOUS EVERY 4 HOURS PRN
Status: DISCONTINUED | OUTPATIENT
Start: 2020-09-07 | End: 2020-09-09

## 2020-09-07 RX ORDER — METOPROLOL TARTRATE 50 MG/1
50 TABLET ORAL 2 TIMES DAILY
Status: DISCONTINUED | OUTPATIENT
Start: 2020-09-07 | End: 2020-09-10

## 2020-09-07 RX ORDER — POTASSIUM CHLORIDE 1.5 G/1.58G
40 POWDER, FOR SOLUTION ORAL
Status: DISCONTINUED | OUTPATIENT
Start: 2020-09-07 | End: 2020-09-09

## 2020-09-07 RX ORDER — POTASSIUM CHLORIDE 1.5 G/1.58G
60 POWDER, FOR SOLUTION ORAL
Status: DISCONTINUED | OUTPATIENT
Start: 2020-09-07 | End: 2020-09-09

## 2020-09-07 RX ADMIN — ATORVASTATIN CALCIUM 80 MG: 40 TABLET, FILM COATED ORAL at 08:09

## 2020-09-07 RX ADMIN — MAGNESIUM OXIDE 400 MG (241.3 MG MAGNESIUM) TABLET 800 MG: TABLET at 05:09

## 2020-09-07 RX ADMIN — HYDRALAZINE HYDROCHLORIDE 10 MG: 20 INJECTION INTRAMUSCULAR; INTRAVENOUS at 02:09

## 2020-09-07 RX ADMIN — MORPHINE SULFATE 2 MG: 2 INJECTION, SOLUTION INTRAMUSCULAR; INTRAVENOUS at 02:09

## 2020-09-07 RX ADMIN — METOPROLOL TARTRATE 12.5 MG: 25 TABLET, FILM COATED ORAL at 09:09

## 2020-09-07 RX ADMIN — POTASSIUM CHLORIDE 40 MEQ: 1.5 POWDER, FOR SOLUTION ORAL at 05:09

## 2020-09-07 RX ADMIN — MAGNESIUM OXIDE 400 MG (241.3 MG MAGNESIUM) TABLET 800 MG: TABLET at 09:09

## 2020-09-07 RX ADMIN — METOPROLOL TARTRATE 50 MG: 50 TABLET, FILM COATED ORAL at 08:09

## 2020-09-07 RX ADMIN — ASPIRIN 81 MG CHEWABLE TABLET 81 MG: 81 TABLET CHEWABLE at 09:09

## 2020-09-07 RX ADMIN — HEPARIN SODIUM AND DEXTROSE 16 UNITS/KG/HR: 10000; 5 INJECTION INTRAVENOUS at 03:09

## 2020-09-07 NOTE — NURSING TRANSFER
Nursing Transfer Note      9/7/2020     Transfer To: [id number]    Transfer via bed    Transfer with cardiac monitoring    Transported by RN and PCT    Medicines sent: Insulin, nitro tablets    Chart send with patient: Yes    Notified: spouse, daughter    Patient reassessed at: (09/07/20, 1445)    Upon arrival to floor: cardiac monitor applied, patient oriented to room, call bell in reach and bed in lowest position

## 2020-09-07 NOTE — PROGRESS NOTES
CT surgery progress note    KOFFI overnight    A/P  70 y.o. male with CAD and DM found to have L main disease     Awaits PFTs  Continue nitro given elevated BP and chest discomfort  Continue MTP 12.5 bid, ASA, and heparin gtt  Tentative plans for CABG next week with Dr. Vazquez   Cont ICU

## 2020-09-07 NOTE — ASSESSMENT & PLAN NOTE
NSTEMI  70M with NSTEMI and hx of DM, HTN, HLD presents for CABG evaluation.    Neuro:  Alert and oriented   No sedation  Pain meds prn    CV:  Hep gtt  Nitroglycerin gtt  Metoprolol 12.5 BID  ASA 81 daily  Atorvastatin 80mg daily  Stop plavix  Carotid U/S (9/6) w/ 49% stenosis bilaterally  Planning CABG 9/8    Pulm:   2L NC  Pending PFT    Renal:  Normal BUN/Cr' trend daily  No Mason    GI:   Diabetic diet    Heme/ID:  Hgb OK, WBC wnl; continue to monitor with daily labs  COVID test ordered for PFTs/OR    Endo:  SSI              PPx:   Feeding: diabetic diet  Analgesia/Sedation: PRN pain meds  Thromboembolic prevention: heparin gtt  HOB >30: yes  Stress Ulcer ppx: not indicated; tolerating diet  Glucose control: Critical care goal 140-180 g/dl, SSI          Dispo: Continue [facility] for CABG, SICU for NG drip; OR on 9/8; supportive care.

## 2020-09-07 NOTE — PROGRESS NOTES
[facility]  Critical Care - Surgery  Progress Note    Patient Name: Bc Garcia  MRN: [id number]  Admission Date: 9/3/2020  Hospital Length of Stay: 4 days  Code Status: Full Code  Attending Provider: Ej Vazquez MD  Primary Care Provider: Primary Doctor No   Principal Problem: NSTEMI (non-ST elevated myocardial infarction)    Subjective:     Hospital/ICU Course:  No notes on file    Interval History/Significant Events: NAEO.  Pt denies complaints at this time.  Chest pain not present with nitro drip.      Follow-up For: Procedure(s) (LRB):  Left heart cath, radial, 9 am (Left)    Post-Operative Day: 3 Days Post-Op    Objective:     Vital Signs (Most Recent):  Temp: 98.2 °F (36.8 °C) (09/07/20 0700)  Pulse: 69 (09/07/20 1100)  Resp: 17 (09/07/20 1100)  BP: (!) 157/68 (09/07/20 1100)  SpO2: (!) 92 % (09/07/20 1100) Vital Signs (24h Range):  Temp:  [98.2 °F (36.8 °C)-98.9 °F (37.2 °C)] 98.2 °F (36.8 °C)  Pulse:  [60-83] 69  Resp:  [12-31] 17  SpO2:  [90 %-100 %] 92 %  BP: (123-178)/() 157/68     Weight: 126 kg (277 lb 12.5 oz)  Body mass index is 41.02 kg/m².      Intake/Output Summary (Last 24 hours) at 9/7/2020 1106  Last data filed at 9/7/2020 0945  Gross per 24 hour   Intake 464 ml   Output 3470 ml   Net -3006 ml       Physical Exam  Vitals signs reviewed.   Constitutional:       Appearance: Normal appearance.   Cardiovascular:      Rate and Rhythm: Normal rate and regular rhythm.      Pulses: Normal pulses.   Pulmonary:      Effort: Pulmonary effort is normal. No respiratory distress.   Abdominal:      General: There is no distension.      Tenderness: There is no abdominal tenderness.      Comments: Obese  No scars   Skin:     General: Skin is warm and dry.      Capillary Refill: Capillary refill takes less than 2 seconds.   Neurological:      General: No focal deficit present.      Mental Status: He is alert.   Psychiatric:         Mood and Affect: Mood normal.             Lines/Drains/Airways     Peripheral Intravenous Line                 Peripheral IV - Single Lumen 09/04/20 1200 20 G Anterior;Left Hand 2 days         Peripheral IV - Single Lumen 09/04/20 1200 20 G Anterior;Proximal;Right Forearm 2 days         Peripheral IV - Single Lumen 09/04/20 1200 20 G Anterior;Right Hand 2 days                Significant Labs:    CBC/Anemia Profile:  Recent Labs   Lab 09/06/20  0430 09/07/20  0315   WBC 11.06 11.53   HGB 12.4* 12.4*   HCT 40.4 39.3*    215   MCV 91 89   RDW 13.2 13.2        Chemistries:  Recent Labs   Lab 09/06/20  0249 09/06/20  0430 09/07/20  0315    136 135*   K 3.9 4.4 3.6    105 103   CO2 21* 22* 26   BUN 12 12 9   CREATININE 0.8 0.8 0.8   CALCIUM 8.3* 8.1* 8.2*   ALBUMIN  --  2.9*  --    PROT  --  6.2  --    BILITOT  --  0.4  --    ALKPHOS  --  95  --    ALT  --  38  --    AST  --  54*  --    MG 1.8 1.8 1.8   PHOS 3.0 3.1 3.5       All pertinent labs within the past 24 hours have been reviewed.    Significant Imaging:  I have reviewed all pertinent imaging results/findings within the past 24 hours.    Assessment/Plan:     * NSTEMI (non-ST elevated myocardial infarction)  NSTEMI  70M with NSTEMI and hx of DM, HTN, HLD presents for CABG evaluation.    Neuro:  Alert and oriented   No sedation  Pain meds prn    CV:  Hep gtt  Nitroglycerin gtt  Metoprolol 12.5 BID  ASA 81 daily  Atorvastatin 80mg daily  Stop plavix  Carotid U/S (9/6) w/ 49% stenosis bilaterally  Planning CABG 9/8    Pulm:   2L NC  Pending PFT    Renal:  Normal BUN/Cr' trend daily  No Mason    GI:   Diabetic diet    Heme/ID:  Hgb OK, WBC wnl; continue to monitor with daily labs  COVID test ordered for PFTs/OR    Endo:  SSI              PPx:   Feeding: diabetic diet  Analgesia/Sedation: PRN pain meds  Thromboembolic prevention: heparin gtt  HOB >30: yes  Stress Ulcer ppx: not indicated; tolerating diet  Glucose control: Critical care goal 140-180 g/dl, SSI          Dispo: Continue [facility]  for CABG, SICU for NG drip; OR on 9/8; supportive care.              Critical care was time spent personally by me on the following activities: development of treatment plan with patient or surrogate and bedside caregivers, discussions with consultants, evaluation of patient's response to treatment, examination of patient, ordering and performing treatments and interventions, ordering and review of laboratory studies, ordering and review of radiographic studies, pulse oximetry, re-evaluation of patient's condition.  This critical care time did not overlap with that of any other provider or involve time for any procedures.     Brian Orozco MD  Critical Care - Surgery  [facility]

## 2020-09-07 NOTE — PROGRESS NOTES
Nutrition-Related Diabetes Education      Time Spent: 1 minute    Learners: patient    Current HbA1c: 9.4    Is patient aware of their A1c and their goal A1c?      yes    Home diabetes medication(s): insulin    Nutrition Education with handouts:  Consistent carbohydrate nutrition therapy    Comments:  Pt unavailable on multiple attempts due to team in room and pt sleeping. Provided handouts to bedside on DM diet consistent carbohydrate intake. Pt to be educated and review handouts on follow up.       Barriers to Learning:  AYSHA    Follow up: 9/11/20    Please consult as needed.  Thank you!  Floresita Rucker RD, LDN

## 2020-09-07 NOTE — PLAN OF CARE
NAEON. Denies chest pain.Pt afebrile. VSS. Maintaining SBP < 180.   2 L NC     Neuro: AAOx4, PERRLA, follows commands, moves all extremities.     Voids to urinal. Adequate urine output.   1 BM.     Gtts:  Heparin @ 16 units/kg/hr  Nitroglycerin @ 5 mcg/min    Skin: no new breakdown noted to sacrum, heel, or elbows. Foams in place.

## 2020-09-07 NOTE — SUBJECTIVE & OBJECTIVE
Interval History/Significant Events: NAEO.  Pt denies complaints at this time.  Chest pain not present with nitro drip.      Follow-up For: Procedure(s) (LRB):  Left heart cath, radial, 9 am (Left)    Post-Operative Day: 3 Days Post-Op    Objective:     Vital Signs (Most Recent):  Temp: 98.2 °F (36.8 °C) (09/07/20 0700)  Pulse: 69 (09/07/20 1100)  Resp: 17 (09/07/20 1100)  BP: (!) 157/68 (09/07/20 1100)  SpO2: (!) 92 % (09/07/20 1100) Vital Signs (24h Range):  Temp:  [98.2 °F (36.8 °C)-98.9 °F (37.2 °C)] 98.2 °F (36.8 °C)  Pulse:  [60-83] 69  Resp:  [12-31] 17  SpO2:  [90 %-100 %] 92 %  BP: (123-178)/() 157/68     Weight: 126 kg (277 lb 12.5 oz)  Body mass index is 41.02 kg/m².      Intake/Output Summary (Last 24 hours) at 9/7/2020 1106  Last data filed at 9/7/2020 0945  Gross per 24 hour   Intake 464 ml   Output 3470 ml   Net -3006 ml       Physical Exam  Vitals signs reviewed.   Constitutional:       Appearance: Normal appearance.   Cardiovascular:      Rate and Rhythm: Normal rate and regular rhythm.      Pulses: Normal pulses.   Pulmonary:      Effort: Pulmonary effort is normal. No respiratory distress.   Abdominal:      General: There is no distension.      Tenderness: There is no abdominal tenderness.      Comments: Obese  No scars   Skin:     General: Skin is warm and dry.      Capillary Refill: Capillary refill takes less than 2 seconds.   Neurological:      General: No focal deficit present.      Mental Status: He is alert.   Psychiatric:         Mood and Affect: Mood normal.            Lines/Drains/Airways     Peripheral Intravenous Line                 Peripheral IV - Single Lumen 09/04/20 1200 20 G Anterior;Left Hand 2 days         Peripheral IV - Single Lumen 09/04/20 1200 20 G Anterior;Proximal;Right Forearm 2 days         Peripheral IV - Single Lumen 09/04/20 1200 20 G Anterior;Right Hand 2 days                Significant Labs:    CBC/Anemia Profile:  Recent Labs   Lab 09/06/20  0430  09/07/20 0315   WBC 11.06 11.53   HGB 12.4* 12.4*   HCT 40.4 39.3*    215   MCV 91 89   RDW 13.2 13.2        Chemistries:  Recent Labs   Lab 09/06/20  0249 09/06/20  0430 09/07/20 0315    136 135*   K 3.9 4.4 3.6    105 103   CO2 21* 22* 26   BUN 12 12 9   CREATININE 0.8 0.8 0.8   CALCIUM 8.3* 8.1* 8.2*   ALBUMIN  --  2.9*  --    PROT  --  6.2  --    BILITOT  --  0.4  --    ALKPHOS  --  95  --    ALT  --  38  --    AST  --  54*  --    MG 1.8 1.8 1.8   PHOS 3.0 3.1 3.5       All pertinent labs within the past 24 hours have been reviewed.    Significant Imaging:  I have reviewed all pertinent imaging results/findings within the past 24 hours.

## 2020-09-08 ENCOUNTER — ANESTHESIA EVENT (OUTPATIENT)
Dept: SURGERY | Facility: HOSPITAL | Age: 70
DRG: 234 | End: 2020-09-08
Payer: MEDICARE

## 2020-09-08 LAB
ABO + RH BLD: NORMAL
ANION GAP SERPL CALC-SCNC: 9 MMOL/L (ref 8–16)
APTT BLDCRRT: 53 SEC (ref 21–32)
APTT BLDCRRT: 57.3 SEC (ref 21–32)
APTT BLDCRRT: 71.2 SEC (ref 21–32)
BASOPHILS # BLD AUTO: 0.06 K/UL (ref 0–0.2)
BASOPHILS NFR BLD: 0.5 % (ref 0–1.9)
BLD GP AB SCN CELLS X3 SERPL QL: NORMAL
BUN SERPL-MCNC: 9 MG/DL (ref 8–23)
CALCIUM SERPL-MCNC: 9 MG/DL (ref 8.7–10.5)
CHLORIDE SERPL-SCNC: 104 MMOL/L (ref 95–110)
CO2 SERPL-SCNC: 25 MMOL/L (ref 23–29)
CREAT SERPL-MCNC: 0.8 MG/DL (ref 0.5–1.4)
DIFFERENTIAL METHOD: ABNORMAL
DLCO ADJ PRE: 26.79 ML/(MIN*MMHG) (ref 19.52–33.38)
DLCO SINGLE BREATH LLN: 19.52
DLCO SINGLE BREATH PRE REF: 97.7 %
DLCO SINGLE BREATH REF: 26.45
DLCOC SBVA LLN: 2.64
DLCOC SBVA PRE REF: 130.9 %
DLCOC SBVA REF: 3.83
DLCOC SINGLE BREATH LLN: 19.52
DLCOC SINGLE BREATH PRE REF: 101.3 %
DLCOC SINGLE BREATH REF: 26.45
DLCOCSBVAULN: 5.02
DLCOCSINGLEBREATHULN: 33.38
DLCOSINGLEBREATHULN: 33.38
DLCOVA LLN: 2.64
DLCOVA PRE REF: 126.3 %
DLCOVA PRE: 4.84 ML/(MIN*MMHG*L) (ref 2.64–5.02)
DLCOVA REF: 3.83
DLCOVAULN: 5.02
DLVAADJ PRE: 5.02 ML/(MIN*MMHG*L) (ref 2.64–5.02)
EOSINOPHIL # BLD AUTO: 0.7 K/UL (ref 0–0.5)
EOSINOPHIL NFR BLD: 5.8 % (ref 0–8)
ERYTHROCYTE [DISTWIDTH] IN BLOOD BY AUTOMATED COUNT: 13.2 % (ref 11.5–14.5)
EST. GFR  (AFRICAN AMERICAN): >60 ML/MIN/1.73 M^2
EST. GFR  (NON AFRICAN AMERICAN): >60 ML/MIN/1.73 M^2
FEF 25 75 LLN: 1.05
FEF 25 75 PRE REF: 92.9 %
FEF 25 75 REF: 2.4
FEV05 LLN: 1.42
FEV05 REF: 2.56
FEV1 FVC LLN: 63
FEV1 FVC PRE REF: 102.3 %
FEV1 FVC REF: 76
FEV1 LLN: 2.26
FEV1 PRE REF: 77.5 %
FEV1 REF: 3.12
FVC LLN: 3.05
FVC PRE REF: 75.4 %
FVC REF: 4.11
GLUCOSE SERPL-MCNC: 178 MG/DL (ref 70–110)
HCT VFR BLD AUTO: 41.7 % (ref 40–54)
HGB BLD-MCNC: 13.4 G/DL (ref 14–18)
IMM GRANULOCYTES # BLD AUTO: 0.05 K/UL (ref 0–0.04)
IMM GRANULOCYTES NFR BLD AUTO: 0.4 % (ref 0–0.5)
IVC PRE: 3.13 L (ref 3.05–5.16)
IVC SINGLE BREATH LLN: 3.05
IVC SINGLE BREATH PRE REF: 76.1 %
IVC SINGLE BREATH REF: 4.11
IVCSINGLEBREATHULN: 5.16
LYMPHOCYTES # BLD AUTO: 3.2 K/UL (ref 1–4.8)
LYMPHOCYTES NFR BLD: 25.1 % (ref 18–48)
MAGNESIUM SERPL-MCNC: 1.7 MG/DL (ref 1.6–2.6)
MCH RBC QN AUTO: 28.3 PG (ref 27–31)
MCHC RBC AUTO-ENTMCNC: 32.1 G/DL (ref 32–36)
MCV RBC AUTO: 88 FL (ref 82–98)
MONOCYTES # BLD AUTO: 0.8 K/UL (ref 0.3–1)
MONOCYTES NFR BLD: 6.6 % (ref 4–15)
MVV LLN: 103
MVV PRE REF: 81.6 %
MVV REF: 121
NEUTROPHILS # BLD AUTO: 7.9 K/UL (ref 1.8–7.7)
NEUTROPHILS NFR BLD: 61.6 % (ref 38–73)
NRBC BLD-RTO: 0 /100 WBC
PEF LLN: 5.97
PEF PRE REF: 115.5 %
PEF REF: 8.21
PHOSPHATE SERPL-MCNC: 3.9 MG/DL (ref 2.7–4.5)
PHYSICIAN COMMENT: ABNORMAL
PLATELET # BLD AUTO: 247 K/UL (ref 150–350)
PMV BLD AUTO: 12.1 FL (ref 9.2–12.9)
POCT GLUCOSE: 179 MG/DL (ref 70–110)
POCT GLUCOSE: 193 MG/DL (ref 70–110)
POCT GLUCOSE: 193 MG/DL (ref 70–110)
POCT GLUCOSE: 200 MG/DL (ref 70–110)
POTASSIUM SERPL-SCNC: 3.9 MMOL/L (ref 3.5–5.1)
PRE DLCO: 25.83 ML/(MIN*MMHG) (ref 19.52–33.38)
PRE FEF 25 75: 2.23 L/S (ref 1.05–3.75)
PRE FET 100: 7.99 SEC
PRE FEV05 REF: 80.6 %
PRE FEV1 FVC: 77.99 % (ref 62.88–89.59)
PRE FEV1: 2.42 L (ref 2.26–3.98)
PRE FEV5: 2.06 L (ref 1.42–3.69)
PRE FVC: 3.1 L (ref 3.05–5.16)
PRE MVV: 99 L/MIN (ref 103.16–139.56)
PRE PEF: 9.49 L/S (ref 5.97–10.45)
RBC # BLD AUTO: 4.74 M/UL (ref 4.6–6.2)
SARS-COV-2 RNA RESP QL NAA+PROBE: NOT DETECTED
SODIUM SERPL-SCNC: 138 MMOL/L (ref 136–145)
VA PRE: 5.34 L (ref 6.75–6.75)
VA SINGLE BREATH LLN: 6.75
VA SINGLE BREATH PRE REF: 79.1 %
VA SINGLE BREATH REF: 6.75
VASINGLEBREATHULN: 6.75
WBC # BLD AUTO: 12.81 K/UL (ref 3.9–12.7)

## 2020-09-08 PROCEDURE — 86901 BLOOD TYPING SEROLOGIC RH(D): CPT

## 2020-09-08 PROCEDURE — 94729 DIFFUSING CAPACITY: CPT | Performed by: INTERNAL MEDICINE

## 2020-09-08 PROCEDURE — 84100 ASSAY OF PHOSPHORUS: CPT

## 2020-09-08 PROCEDURE — 80048 BASIC METABOLIC PNL TOTAL CA: CPT

## 2020-09-08 PROCEDURE — 63600175 PHARM REV CODE 636 W HCPCS: Performed by: THORACIC SURGERY (CARDIOTHORACIC VASCULAR SURGERY)

## 2020-09-08 PROCEDURE — 83735 ASSAY OF MAGNESIUM: CPT

## 2020-09-08 PROCEDURE — 27000221 HC OXYGEN, UP TO 24 HOURS

## 2020-09-08 PROCEDURE — 99233 SBSQ HOSP IP/OBS HIGH 50: CPT | Mod: ,,, | Performed by: ANESTHESIOLOGY

## 2020-09-08 PROCEDURE — 85730 THROMBOPLASTIN TIME PARTIAL: CPT | Mod: 91

## 2020-09-08 PROCEDURE — 25000003 PHARM REV CODE 250: Performed by: HOSPITALIST

## 2020-09-08 PROCEDURE — 85025 COMPLETE CBC W/AUTO DIFF WBC: CPT

## 2020-09-08 PROCEDURE — 85730 THROMBOPLASTIN TIME PARTIAL: CPT

## 2020-09-08 PROCEDURE — 94010 BREATHING CAPACITY TEST: CPT | Performed by: INTERNAL MEDICINE

## 2020-09-08 PROCEDURE — 99233 PR SUBSEQUENT HOSPITAL CARE,LEVL III: ICD-10-PCS | Mod: ,,, | Performed by: ANESTHESIOLOGY

## 2020-09-08 PROCEDURE — 94761 N-INVAS EAR/PLS OXIMETRY MLT: CPT

## 2020-09-08 PROCEDURE — 63600175 PHARM REV CODE 636 W HCPCS: Performed by: STUDENT IN AN ORGANIZED HEALTH CARE EDUCATION/TRAINING PROGRAM

## 2020-09-08 PROCEDURE — 20000000 HC ICU ROOM

## 2020-09-08 PROCEDURE — 25000003 PHARM REV CODE 250: Performed by: STUDENT IN AN ORGANIZED HEALTH CARE EDUCATION/TRAINING PROGRAM

## 2020-09-08 PROCEDURE — 99900035 HC TECH TIME PER 15 MIN (STAT)

## 2020-09-08 RX ORDER — AMLODIPINE BESYLATE 10 MG/1
10 TABLET ORAL DAILY
Status: DISCONTINUED | OUTPATIENT
Start: 2020-09-08 | End: 2020-09-09

## 2020-09-08 RX ORDER — FUROSEMIDE 10 MG/ML
20 INJECTION INTRAMUSCULAR; INTRAVENOUS
Status: COMPLETED | OUTPATIENT
Start: 2020-09-08 | End: 2020-09-08

## 2020-09-08 RX ORDER — LANOLIN ALCOHOL/MO/W.PET/CERES
800 CREAM (GRAM) TOPICAL 2 TIMES DAILY
Status: DISCONTINUED | OUTPATIENT
Start: 2020-09-08 | End: 2020-09-09

## 2020-09-08 RX ORDER — FUROSEMIDE 10 MG/ML
20 INJECTION INTRAMUSCULAR; INTRAVENOUS
Status: DISCONTINUED | OUTPATIENT
Start: 2020-09-08 | End: 2020-09-08

## 2020-09-08 RX ORDER — MUPIROCIN 20 MG/G
OINTMENT TOPICAL
Status: CANCELLED | OUTPATIENT
Start: 2020-09-08

## 2020-09-08 RX ADMIN — HEPARIN SODIUM AND DEXTROSE 16 UNITS/KG/HR: 10000; 5 INJECTION INTRAVENOUS at 03:09

## 2020-09-08 RX ADMIN — FUROSEMIDE 20 MG: 10 INJECTION, SOLUTION INTRAMUSCULAR; INTRAVENOUS at 10:09

## 2020-09-08 RX ADMIN — Medication 800 MG: at 08:09

## 2020-09-08 RX ADMIN — AMLODIPINE BESYLATE 10 MG: 10 TABLET ORAL at 10:09

## 2020-09-08 RX ADMIN — METOPROLOL TARTRATE 50 MG: 50 TABLET, FILM COATED ORAL at 08:09

## 2020-09-08 RX ADMIN — FUROSEMIDE 20 MG: 10 INJECTION, SOLUTION INTRAMUSCULAR; INTRAVENOUS at 09:09

## 2020-09-08 RX ADMIN — HEPARIN SODIUM AND DEXTROSE 14 UNITS/KG/HR: 10000; 5 INJECTION INTRAVENOUS at 10:09

## 2020-09-08 RX ADMIN — ATORVASTATIN CALCIUM 80 MG: 40 TABLET, FILM COATED ORAL at 08:09

## 2020-09-08 RX ADMIN — ASPIRIN 81 MG CHEWABLE TABLET 81 MG: 81 TABLET CHEWABLE at 08:09

## 2020-09-08 NOTE — PLAN OF CARE
SICU PLAN OF CARE NOTE     Dx:  CAD, CABG Workup    Shift Events: NAEON     Goals of Care: PFTs today, plan for surgery 9/9     Neuro: AAO x4, moves all extremities     Vital Signs: BP hypertensive 130's-160's/80s-110. Afebrile. SpO2 96% on 2L NC O2 (while sleeping). HR 60's NSR. No complaints of pain.     Diet: Consistent carb/cardiac     Gtts: Heparin at 16 unit/kg/hr, Nitro at 5 mcg/min     Urine Output: 775 cc/shift

## 2020-09-08 NOTE — NURSING
Pt disconnected from wall monitor, and connected to portable monitor. Pt transferred to pulmonary clinic - portable telemetry and ambu bag present. PFT Obtained. Pt transferred back to SICU [id number] and reconnected to wall monitor. Pt reassessed, findings concur with prior assessments. Vital signs remained appropriate for this particular patient during transport and during of procedure.

## 2020-09-08 NOTE — SUBJECTIVE & OBJECTIVE
Interval History/Significant Events: NAEO.  Pt resting comfortably this morning.      Follow-up For: Procedure(s) (LRB):  Left heart cath, radial, 9 am (Left)    Post-Operative Day: 4 Days Post-Op    Objective:     Vital Signs (Most Recent):  Temp: 98 °F (36.7 °C) (09/08/20 0300)  Pulse: 64 (09/08/20 0533)  Resp: 16 (09/08/20 0533)  BP: (!) 166/74 (09/08/20 0500)  SpO2: 98 % (09/08/20 0533) Vital Signs (24h Range):  Temp:  [98 °F (36.7 °C)-98.6 °F (37 °C)] 98 °F (36.7 °C)  Pulse:  [60-92] 64  Resp:  [12-33] 16  SpO2:  [90 %-99 %] 98 %  BP: (128-206)/(60-93) 166/74     Weight: 126 kg (277 lb 12.5 oz)  Body mass index is 41.02 kg/m².      Intake/Output Summary (Last 24 hours) at 9/8/2020 0729  Last data filed at 9/8/2020 0300  Gross per 24 hour   Intake 566 ml   Output 3250 ml   Net -2684 ml       Physical Exam  Vitals signs reviewed.   Constitutional:       Appearance: Normal appearance.   Cardiovascular:      Rate and Rhythm: Normal rate and regular rhythm.      Pulses: Normal pulses.   Pulmonary:      Effort: Pulmonary effort is normal. No respiratory distress.   Abdominal:      General: There is no distension.      Tenderness: There is no abdominal tenderness.      Comments: Obese  No scars   Skin:     General: Skin is warm and dry.      Capillary Refill: Capillary refill takes less than 2 seconds.   Neurological:      General: No focal deficit present.      Mental Status: He is alert.   Psychiatric:         Mood and Affect: Mood normal.         Vents:       Lines/Drains/Airways     Peripheral Intravenous Line                 Peripheral IV - Single Lumen 09/04/20 1200 20 G Anterior;Left Hand 3 days         Peripheral IV - Single Lumen 09/08/20 0045 20 G Anterior;Left;Proximal Forearm less than 1 day                Significant Labs:    CBC/Anemia Profile:  Recent Labs   Lab 09/07/20  0315 09/08/20  0340   WBC 11.53 12.81*   HGB 12.4* 13.4*   HCT 39.3* 41.7    247   MCV 89 88   RDW 13.2 13.2         Chemistries:  Recent Labs   Lab 09/07/20  0315 09/08/20  0340   * 138   K 3.6 3.9    104   CO2 26 25   BUN 9 9   CREATININE 0.8 0.8   CALCIUM 8.2* 9.0   MG 1.8 1.7   PHOS 3.5 3.9       All pertinent labs within the past 24 hours have been reviewed.    Significant Imaging:  I have reviewed all pertinent imaging results/findings within the past 24 hours.

## 2020-09-08 NOTE — NURSING
"      SICU PLAN OF CARE NOTE    Dx: NSTEMI (non-ST elevated myocardial infarction)    Shift Events: PFT, CT of chest; consents obtained for OR tomorrow    Goals of Care: CABG tomorrow; MAP >65, comfort    Neuro: AAO x4, Follows Commands and Moves All Extremities    Vital Signs: BP (!) 142/84 (BP Location: Right arm, Patient Position: Lying)   Pulse 73   Temp 98 °F (36.7 °C) (Oral)   Resp (!) 27   Ht 5' 9" (1.753 m)   Wt 126 kg (277 lb 12.5 oz)   SpO2 (!) 94%   BMI 41.02 kg/m²     Respiratory: Room Air    Diet: Cardiac Diet; NPO at midnight    Gtts: Heparin and nitroglycerin    Urine Output: Voids Spontaneously 2450 cc/shift after 20 lasix IVP given     Labs/Accuchecks: accuchecks achs, no coverage needed.    Skin: free of breakdown and tears; pt shift weight independently and repositions.   Hibiclens shower to be given tonight. Beard shaved today per Dr. Vazquez's request.   POC reviewed with pt and spouse, all questions answered. See flowsheets for full assessment data. WCTM.       "

## 2020-09-08 NOTE — PROGRESS NOTES
[facility]  Critical Care - Surgery  Progress Note    Patient Name: Bc Garcia  MRN: [id number]  Admission Date: 9/3/2020  Hospital Length of Stay: 5 days  Code Status: Full Code  Attending Provider: Ej Vazquez MD  Primary Care Provider: Primary Doctor No   Principal Problem: NSTEMI (non-ST elevated myocardial infarction)    Subjective:     Hospital/ICU Course:  No notes on file    Interval History/Significant Events: NAEO.  Pt resting comfortably this morning.      Follow-up For: Procedure(s) (LRB):  Left heart cath, radial, 9 am (Left)    Post-Operative Day: 4 Days Post-Op    Objective:     Vital Signs (Most Recent):  Temp: 98 °F (36.7 °C) (09/08/20 0300)  Pulse: 64 (09/08/20 0533)  Resp: 16 (09/08/20 0533)  BP: (!) 166/74 (09/08/20 0500)  SpO2: 98 % (09/08/20 0533) Vital Signs (24h Range):  Temp:  [98 °F (36.7 °C)-98.6 °F (37 °C)] 98 °F (36.7 °C)  Pulse:  [60-92] 64  Resp:  [12-33] 16  SpO2:  [90 %-99 %] 98 %  BP: (128-206)/(60-93) 166/74     Weight: 126 kg (277 lb 12.5 oz)  Body mass index is 41.02 kg/m².      Intake/Output Summary (Last 24 hours) at 9/8/2020 0729  Last data filed at 9/8/2020 0300  Gross per 24 hour   Intake 566 ml   Output 3250 ml   Net -2684 ml       Physical Exam  Vitals signs reviewed.   Constitutional:       Appearance: Normal appearance.   Cardiovascular:      Rate and Rhythm: Normal rate and regular rhythm.      Pulses: Normal pulses.   Pulmonary:      Effort: Pulmonary effort is normal. No respiratory distress.   Abdominal:      General: There is no distension.      Tenderness: There is no abdominal tenderness.      Comments: Obese  No scars   Skin:     General: Skin is warm and dry.      Capillary Refill: Capillary refill takes less than 2 seconds.   Neurological:      General: No focal deficit present.      Mental Status: He is alert.   Psychiatric:         Mood and Affect: Mood normal.         Vents:       Lines/Drains/Airways     Peripheral Intravenous Line                  Peripheral IV - Single Lumen 09/04/20 1200 20 G Anterior;Left Hand 3 days         Peripheral IV - Single Lumen 09/08/20 0045 20 G Anterior;Left;Proximal Forearm less than 1 day                Significant Labs:    CBC/Anemia Profile:  Recent Labs   Lab 09/07/20  0315 09/08/20  0340   WBC 11.53 12.81*   HGB 12.4* 13.4*   HCT 39.3* 41.7    247   MCV 89 88   RDW 13.2 13.2        Chemistries:  Recent Labs   Lab 09/07/20  0315 09/08/20  0340   * 138   K 3.6 3.9    104   CO2 26 25   BUN 9 9   CREATININE 0.8 0.8   CALCIUM 8.2* 9.0   MG 1.8 1.7   PHOS 3.5 3.9       All pertinent labs within the past 24 hours have been reviewed.    Significant Imaging:  I have reviewed all pertinent imaging results/findings within the past 24 hours.    Assessment/Plan:     * NSTEMI (non-ST elevated myocardial infarction)  NSTEMI  70M with NSTEMI and hx of DM, HTN, HLD presents for CABG evaluation.    Neuro:  Alert and oriented   No sedation  Pain meds prn    CV:  Hep gtt  Nitroglycerin gtt  Amlodipine 10mg daily  Metoprolol 50mg BID  ASA 81 daily  Atorvastatin 80mg daily  Plavix held  Carotid U/S (9/6) w/ 49% stenosis bilaterally  Planning CABG 9/9 following PFTs on 9/8    Pulm:   2L NC  Pending PFT    Renal:  Normal BUN/Cr' trend daily  No Mason    GI:   Diabetic diet  MPO at midnight for OR tomorrow    Heme/ID:  Hgb OK, WBC wnl; continue to monitor with daily labs  COVID negative 9/7    Endo:  SSI              PPx:   Feeding: diabetic diet  Analgesia/Sedation: PRN pain meds  Thromboembolic prevention: heparin gtt  HOB >30: yes  Stress Ulcer ppx: not indicated; tolerating diet  Glucose control: Critical care goal 140-180 g/dl, SSI          Dispo: Continue [facility] for CABG, SICU for NG drip; OR on 9/9; supportive care.              Critical care was time spent personally by me on the following activities: development of treatment plan with patient or surrogate and bedside caregivers, discussions with consultants,  evaluation of patient's response to treatment, examination of patient, ordering and performing treatments and interventions, ordering and review of laboratory studies, ordering and review of radiographic studies, pulse oximetry, re-evaluation of patient's condition.  This critical care time did not overlap with that of any other provider or involve time for any procedures.     Brian Orozco MD  Critical Care - Surgery  [facility]

## 2020-09-08 NOTE — PLAN OF CARE
09/08/20 1022   Discharge Reassessment   Assessment Type Discharge Planning Reassessment   Provided patient/caregiver education on the expected discharge date and the discharge plan Yes   Do you have any problems affording any of your prescribed medications? No   Discharge Plan A Home with family   Discharge Plan B Home Health   DME Needed Upon Discharge  other (see comments)  (TBD)       Susanne Hu MPH, RN, CM  Ext. [phone]

## 2020-09-08 NOTE — PLAN OF CARE
SW is following this Pt for DC planning needs. There are no identified needs at this time. Discharge Disposition: TBD - pending patient progress; OR tomorrow for CABG    SW will continue to coordinate with patient, family, team and insurance to complete patient's discharge plan.    Kaitlyn Workman LMSW   - Case Management

## 2020-09-08 NOTE — ASSESSMENT & PLAN NOTE
NSTEMI  70M with NSTEMI and hx of DM, HTN, HLD presents for CABG evaluation.    Neuro:  Alert and oriented   No sedation  Pain meds prn    CV:  Hep gtt  Nitroglycerin gtt  Metoprolol 50mg BID  ASA 81 daily  Atorvastatin 80mg daily  Plavix held  Carotid U/S (9/6) w/ 49% stenosis bilaterally  Planning CABG 9/9 following PFTs on 9/8    Pulm:   2L NC  Pending PFT    Renal:  Normal BUN/Cr' trend daily  No Mason    GI:   Diabetic diet  MPO at midnight for OR tomorrow    Heme/ID:  Hgb OK, WBC wnl; continue to monitor with daily labs  COVID negative 9/7    Endo:  SSI              PPx:   Feeding: diabetic diet  Analgesia/Sedation: PRN pain meds  Thromboembolic prevention: heparin gtt  HOB >30: yes  Stress Ulcer ppx: not indicated; tolerating diet  Glucose control: Critical care goal 140-180 g/dl, SSI          Dispo: Continue [facility] for CABG, SICU for NG drip; OR on 9/9; supportive care.

## 2020-09-08 NOTE — PROGRESS NOTES
CT surgery progress note     KOFFI overnight  Awaits PFTs today    A/P  70 y.o. male with CAD and DM found to have L main disease    PFTs today  Continue medical management  Surgery d/w Dr. Vazquez  OR tomorrow for bypass       Cont ICU

## 2020-09-08 NOTE — ANESTHESIA PREPROCEDURE EVALUATION
[facility]  Anesthesia Pre-Operative Evaluation         Patient Name: Bc Garcia  YOB: 1950  MRN: [id number]    SUBJECTIVE:     Pre-operative evaluation for Procedure(s) (LRB):  CORONARY ARTERY BYPASS GRAFT (CABG) (N/A)  HARVEST-VEIN-ENDOVASCULAR (N/A)     09/08/2020    Bc Garcia is a 70 y.o. male w/ a significant PMHx of HLD, tobacco abuse (smoked 2 ppd x 20 years, quit '95), obesity, HTN and DM2 who presented with NSTEMI to [place], [place] [facility] [place] now on heparin gtt and Nitro gtt. HD stable on 2L NC currently.    L heart cath 09/04/2020:  · LVEDP (Pre): 22  · Three vessel coronary artery disease. Left Main disease.     Left main:  Left main has a 70% stenosis.  Distal left main has a aneurysmal dilatation extending into ostial circumflex.  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  OM2 is the culprit vessel for his non STEMI.  Proximal OM2 severe 90% stenosis  RCA:  Non dominant:  Proximal 50%, mid 70% stenosis.    TTE 09/04/2020:  · Mild concentric left ventricular hypertrophy.  · Normal left ventricular systolic function. The estimated ejection fraction is 60%.  · Normal LV diastolic function.  · Septal wall has abnormal motion.  · Normal right ventricular systolic function.  · Mild to moderate left atrial enlargement.  · Mild right atrial enlargement.  · Normal central venous pressure (3 mmHg).    Prev airway: None documented.     Patient Active Problem List   Diagnosis    NSTEMI (non-ST elevated myocardial infarction)    Obesity (BMI 35.0-39.9 without comorbidity)    Uncontrolled type 2 diabetes mellitus without complication, without long-term current use of insulin    Essential hypertension    Mixed hyperlipidemia    ACS (acute coronary syndrome)       Review of patient's allergies indicates:  No Known  Allergies    Current Inpatient Medications:      Current Facility-Administered Medications on File Prior to Visit   Medication Dose Route Frequency Provider Last Rate Last Dose    acetaminophen tablet 650 mg  650 mg Oral Q4H PRN Brian Marino MD   650 mg at 09/06/20 1730    amLODIPine tablet 10 mg  10 mg Oral Daily Brian Orozco MD        aspirin chewable tablet 81 mg  81 mg Oral Daily Brian Marino MD   81 mg at 09/08/20 0817    atorvastatin tablet 80 mg  80 mg Oral QHS Brian Marino MD   80 mg at 09/07/20 2038    dextrose 50% injection 12.5 g  12.5 g Intravenous PRN Brian Marino MD        furosemide injection 20 mg  20 mg Intravenous Q12H Zack Mora MD        glucagon (human recombinant) injection 1 mg  1 mg Intramuscular PRN Brian Marino MD        heparin 25,000 units in dextrose 5% (100 units/ml) IV bolus from bag - ADDITIONAL PRN BOLUS - 30 units/kg (max bolus 4000 units)  30 Units/kg (Adjusted) Intravenous PRN Brian Marino MD   2,690 Units at 09/04/20 1331    heparin 25,000 units in dextrose 5% (100 units/ml) IV bolus from bag - ADDITIONAL PRN BOLUS - 60 units/kg (max bolus 4000 units)  44.5 Units/kg (Adjusted) Intravenous PRN Brian Marino MD   4,000 Units at 09/04/20 0444    heparin 25,000 units in dextrose 5% 250 mL (100 units/mL) infusion LOW INTENSITY nomogram - [facility]  14 Units/kg/hr (Adjusted) Intravenous Continuous Ej Vazquez MD 12.6 mL/hr at 09/08/20 1000 14 Units/kg/hr at 09/08/20 1000    hydrALAZINE injection 10 mg  10 mg Intravenous Q4H PRN Jammie Molina MD   10 mg at 09/07/20 1409    insulin aspart U-100 pen 0-5 Units  0-5 Units Subcutaneous Q6H PRN Brian Marino MD   2 Units at 09/06/20 1700    magnesium oxide tablet 800 mg  800 mg Oral PRN Brian Orozco MD   800 mg at 09/07/20 0932    magnesium oxide tablet 800 mg  800 mg Oral PRN Brian Orozco MD        magnesium oxide tablet 800 mg  800 mg Oral BID Jammie Molina MD   800 mg at  09/08/20 0817    melatonin tablet 6 mg  6 mg Oral Nightly PRN Brian Marino MD        metoprolol tartrate (LOPRESSOR) tablet 50 mg  50 mg Oral BID Jammie Molina MD   50 mg at 09/08/20 0817    morphine injection 2 mg  2 mg Intravenous Q1H PRN Ej Vazquez MD   2 mg at 09/07/20 1407    nitroGLYCERIN 50 mg in dextrose 5 % 250 mL infusion (NON-TITRATING)  5 mcg/min Intravenous Continuous Amadou Velez MD 1.5 mL/hr at 09/08/20 1000 5 mcg/min at 09/08/20 1000    nitroGLYCERIN SL tablet 0.4 mg  0.4 mg Sublingual Q5 Min PRN KEITH Frias MD   0.4 mg at 09/06/20 0320    ondansetron injection 8 mg  8 mg Intravenous Q6H PRN Brian Marino MD        pneumoc 13-wilian conj-dip cr(PF) (PREVNAR 13 (PF)) 0.5 mL  0.5 mL Intramuscular vaccine x 1 dose Brian Marino MD        potassium chloride packet 40 mEq  40 mEq Oral PRN Brian Orozco MD   40 mEq at 09/07/20 0558    potassium chloride packet 40 mEq  40 mEq Oral PRN Brian Orozco MD        potassium chloride packet 60 mEq  60 mEq Oral PRN Brian Orozco MD        potassium, sodium phosphates 280-160-250 mg packet 2 packet  2 packet Oral PRN Brian Orozco MD        potassium, sodium phosphates 280-160-250 mg packet 2 packet  2 packet Oral PRN Brian Orozco MD        potassium, sodium phosphates 280-160-250 mg packet 2 packet  2 packet Oral PRN Brian Orozco MD        promethazine (PHENERGAN) 6.25 mg in dextrose 5 % 50 mL IVPB  6.25 mg Intravenous Q6H PRN Brian Marino MD        senna-docusate 8.6-50 mg per tablet 1 tablet  1 tablet Oral BID PRN Brian Marino MD        sodium chloride 0.9% flush 10 mL  10 mL Intravenous PRN Brian Marino MD         No current outpatient medications on file prior to visit.       Past Surgical History:   Procedure Laterality Date    LEFT HEART CATHETERIZATION Left 9/4/2020    Procedure: Left heart cath, radial, 9 am;  Surgeon: Kiel Rey MD;  Location: [facility] CATH LAB;  Service: Cardiology;   Laterality: Left;    left knee skin excision Left        Social History     Socioeconomic History    Marital status:      Spouse name: Not on file    Number of children: Not on file    Years of education: Not on file    Highest education level: Not on file   Occupational History    Not on file   Social Needs    Financial resource strain: Not on file    Food insecurity     Worry: Not on file     Inability: Not on file    Transportation needs     Medical: Not on file     Non-medical: Not on file   Tobacco Use    Smoking status: Former Smoker     Packs/day: 2.00     Years: 20.00     Pack years: 40.00     Types: Cigarettes     Quit date: 1995     Years since quittin.0   Substance and Sexual Activity    Alcohol use: Not Currently     Comment: Quit in     Drug use: Never    Sexual activity: Not on file   Lifestyle    Physical activity     Days per week: Not on file     Minutes per session: Not on file    Stress: Not on file   Relationships    Social connections     Talks on phone: Not on file     Gets together: Not on file     Attends Anabaptist service: Not on file     Active member of club or organization: Not on file     Attends meetings of clubs or organizations: Not on file     Relationship status: Not on file   Other Topics Concern    Not on file   Social History Narrative    Not on file       OBJECTIVE:     Vital Signs Range (Last 24H):  Temp:  [36.7 °C (98 °F)-37 °C (98.6 °F)]   Pulse:  [60-92]   Resp:  [12-35]   BP: (128-206)/(60-93)   SpO2:  [92 %-99 %]       CBC:   Recent Labs     20  0340   WBC 11.53 12.81*   RBC 4.40* 4.74   HGB 12.4* 13.4*   HCT 39.3* 41.7    247   MCV 89 88   MCH 28.2 28.3   MCHC 31.6* 32.1       CMP:   Recent Labs     20  0430 20  0315 20  0340    135* 138   K 4.4 3.6 3.9    103 104   CO2 22* 26 25   BUN 12 9 9   CREATININE 0.8 0.8 0.8   * 166* 178*   MG 1.8 1.8 1.7   PHOS 3.1 3.5 3.9    CALCIUM 8.1* 8.2* 9.0   ALBUMIN 2.9*  --   --    PROT 6.2  --   --    ALKPHOS 95  --   --    ALT 38  --   --    AST 54*  --   --    BILITOT 0.4  --   --        INR:  Recent Labs     09/06/20  0430 09/07/20  0315 09/08/20  0340   APTT 53.9* 49.7* 71.2*       Diagnostic Studies: No relevant studies.    EKG: No recent studies available.    2D ECHO:  No results found for this or any previous visit.      ASSESSMENT/PLAN:         Pre-op Assessment    I have reviewed the Patient Summary Reports.    I have reviewed the NPO Status.   I have reviewed the Medications.     Review of Systems  Anesthesia Hx:  No previous Anesthesia  Neg history of prior surgery. Denies Family Hx of Anesthesia complications.   Denies Personal Hx of Anesthesia complications.   Social:  Former Smoker    Hematology/Oncology:  Hematology Normal   Oncology Normal     Cardiovascular:   Hypertension Past MI CAD   hyperlipidemia    Pulmonary:  Pulmonary Normal    Renal/:  Renal/ Normal     Hepatic/GI:  Hepatic/GI Normal    Neurological:  Neurology Normal    Endocrine:   Diabetes, type 2        Physical Exam  General:  Well nourished, Obesity    Airway/Jaw/Neck:  Airway Findings: (large beard) Mallampati: I TM Distance: Normal, at least 6 cm  Jaw/Neck Findings:  Neck ROM: Normal ROM     Eyes/Ears/Nose:  EYES/EARS/NOSE FINDINGS: Normal   Dental:  Dental Findings: Lower Dentures, Upper Dentures   Chest/Lungs:  Chest/Lungs Findings: Clear to auscultation, Normal Respiratory Rate     Heart/Vascular:  Heart Findings: Rate: Normal  Rhythm: Regular Rhythm      Musculoskeletal:  Musculoskeletal Findings: Normal    Mental Status:  Mental Status Findings:  Cooperative, Alert and Oriented         Anesthesia Plan  Type of Anesthesia, risks & benefits discussed:  Anesthesia Type:  general  Patient's Preference:   Intra-op Monitoring Plan: arterial line, central line and standard ASA monitors  Intra-op Monitoring Plan Comments:   Post Op Pain Control Plan:  multimodal analgesia, IV/PO Opioids PRN and per primary service following discharge from PACU  Post Op Pain Control Plan Comments:   Induction:    Beta Blocker:         Informed Consent: Patient understands risks and agrees with Anesthesia plan.  Questions answered. Anesthesia consent signed with patient.  ASA Score: 4     Day of Surgery Review of History & Physical:    H&P update referred to the surgeon.         Ready For Surgery From Anesthesia Perspective.

## 2020-09-09 ENCOUNTER — ANESTHESIA (OUTPATIENT)
Dept: SURGERY | Facility: HOSPITAL | Age: 70
DRG: 234 | End: 2020-09-09
Payer: MEDICARE

## 2020-09-09 DIAGNOSIS — Z95.1 S/P CABG (CORONARY ARTERY BYPASS GRAFT): Primary | ICD-10-CM

## 2020-09-09 LAB
ALBUMIN SERPL BCP-MCNC: 3.5 G/DL (ref 3.5–5.2)
ALLENS TEST: ABNORMAL
ALLENS TEST: NORMAL
ALP SERPL-CCNC: 87 U/L (ref 55–135)
ALT SERPL W/O P-5'-P-CCNC: 31 U/L (ref 10–44)
ANION GAP SERPL CALC-SCNC: 13 MMOL/L (ref 8–16)
ANION GAP SERPL CALC-SCNC: 9 MMOL/L (ref 8–16)
APTT BLDCRRT: 30.2 SEC (ref 21–32)
APTT BLDCRRT: 43.4 SEC (ref 21–32)
AST SERPL-CCNC: 40 U/L (ref 10–40)
BASOPHILS # BLD AUTO: 0.05 K/UL (ref 0–0.2)
BASOPHILS # BLD AUTO: 0.08 K/UL (ref 0–0.2)
BASOPHILS NFR BLD: 0.2 % (ref 0–1.9)
BASOPHILS NFR BLD: 0.6 % (ref 0–1.9)
BILIRUB SERPL-MCNC: 0.6 MG/DL (ref 0.1–1)
BUN SERPL-MCNC: 10 MG/DL (ref 8–23)
BUN SERPL-MCNC: 10 MG/DL (ref 8–23)
CALCIUM SERPL-MCNC: 8.5 MG/DL (ref 8.7–10.5)
CALCIUM SERPL-MCNC: 9.4 MG/DL (ref 8.7–10.5)
CHLORIDE SERPL-SCNC: 100 MMOL/L (ref 95–110)
CHLORIDE SERPL-SCNC: 103 MMOL/L (ref 95–110)
CO2 SERPL-SCNC: 26 MMOL/L (ref 23–29)
CO2 SERPL-SCNC: 28 MMOL/L (ref 23–29)
CREAT SERPL-MCNC: 0.9 MG/DL (ref 0.5–1.4)
CREAT SERPL-MCNC: 0.9 MG/DL (ref 0.5–1.4)
DELSYS: ABNORMAL
DIFFERENTIAL METHOD: ABNORMAL
DIFFERENTIAL METHOD: ABNORMAL
EOSINOPHIL # BLD AUTO: 0.1 K/UL (ref 0–0.5)
EOSINOPHIL # BLD AUTO: 0.8 K/UL (ref 0–0.5)
EOSINOPHIL NFR BLD: 0.3 % (ref 0–8)
EOSINOPHIL NFR BLD: 5.6 % (ref 0–8)
EP: 5
ERYTHROCYTE [DISTWIDTH] IN BLOOD BY AUTOMATED COUNT: 13.2 % (ref 11.5–14.5)
ERYTHROCYTE [DISTWIDTH] IN BLOOD BY AUTOMATED COUNT: 13.3 % (ref 11.5–14.5)
ERYTHROCYTE [SEDIMENTATION RATE] IN BLOOD BY WESTERGREN METHOD: 4 MM/H
EST. GFR  (AFRICAN AMERICAN): >60 ML/MIN/1.73 M^2
EST. GFR  (AFRICAN AMERICAN): >60 ML/MIN/1.73 M^2
EST. GFR  (NON AFRICAN AMERICAN): >60 ML/MIN/1.73 M^2
EST. GFR  (NON AFRICAN AMERICAN): >60 ML/MIN/1.73 M^2
FIO2: 50
FIO2: 80
GLUCOSE SERPL-MCNC: 186 MG/DL (ref 70–110)
GLUCOSE SERPL-MCNC: 202 MG/DL (ref 70–110)
GLUCOSE SERPL-MCNC: 210 MG/DL (ref 70–110)
GLUCOSE SERPL-MCNC: 212 MG/DL (ref 70–110)
GLUCOSE SERPL-MCNC: 215 MG/DL (ref 70–110)
GLUCOSE SERPL-MCNC: 217 MG/DL (ref 70–110)
GLUCOSE SERPL-MCNC: 219 MG/DL (ref 70–110)
GLUCOSE SERPL-MCNC: 224 MG/DL (ref 70–110)
HCO3 UR-SCNC: 25.8 MMOL/L (ref 24–28)
HCO3 UR-SCNC: 27.2 MMOL/L (ref 24–28)
HCO3 UR-SCNC: 28.7 MMOL/L (ref 24–28)
HCO3 UR-SCNC: 28.7 MMOL/L (ref 24–28)
HCO3 UR-SCNC: 29.4 MMOL/L (ref 24–28)
HCO3 UR-SCNC: 29.4 MMOL/L (ref 24–28)
HCO3 UR-SCNC: 30.1 MMOL/L (ref 24–28)
HCO3 UR-SCNC: 31 MMOL/L (ref 24–28)
HCO3 UR-SCNC: 33.1 MMOL/L (ref 24–28)
HCT VFR BLD AUTO: 37.5 % (ref 40–54)
HCT VFR BLD AUTO: 45.4 % (ref 40–54)
HCT VFR BLD CALC: 32 %PCV (ref 36–54)
HCT VFR BLD CALC: 32 %PCV (ref 36–54)
HCT VFR BLD CALC: 33 %PCV (ref 36–54)
HCT VFR BLD CALC: 33 %PCV (ref 36–54)
HCT VFR BLD CALC: 36 %PCV (ref 36–54)
HCT VFR BLD CALC: 41 %PCV (ref 36–54)
HCT VFR BLD CALC: 43 %PCV (ref 36–54)
HGB BLD-MCNC: 11.8 G/DL (ref 14–18)
HGB BLD-MCNC: 14.2 G/DL (ref 14–18)
IMM GRANULOCYTES # BLD AUTO: 0.06 K/UL (ref 0–0.04)
IMM GRANULOCYTES # BLD AUTO: 0.19 K/UL (ref 0–0.04)
IMM GRANULOCYTES NFR BLD AUTO: 0.4 % (ref 0–0.5)
IMM GRANULOCYTES NFR BLD AUTO: 0.9 % (ref 0–0.5)
INR PPP: 1.1 (ref 0.8–1.2)
IP: 12
LACTATE SERPL-SCNC: 1.2 MMOL/L (ref 0.5–2.2)
LDH SERPL L TO P-CCNC: 0.65 MMOL/L (ref 0.36–1.25)
LDH SERPL L TO P-CCNC: 0.95 MMOL/L (ref 0.36–1.25)
LDH SERPL L TO P-CCNC: 1 MMOL/L (ref 0.36–1.25)
LYMPHOCYTES # BLD AUTO: 1.9 K/UL (ref 1–4.8)
LYMPHOCYTES # BLD AUTO: 3.8 K/UL (ref 1–4.8)
LYMPHOCYTES NFR BLD: 27.1 % (ref 18–48)
LYMPHOCYTES NFR BLD: 8.5 % (ref 18–48)
MAGNESIUM SERPL-MCNC: 1.8 MG/DL (ref 1.6–2.6)
MAGNESIUM SERPL-MCNC: 2.5 MG/DL (ref 1.6–2.6)
MCH RBC QN AUTO: 27.7 PG (ref 27–31)
MCH RBC QN AUTO: 28 PG (ref 27–31)
MCHC RBC AUTO-ENTMCNC: 31.3 G/DL (ref 32–36)
MCHC RBC AUTO-ENTMCNC: 31.5 G/DL (ref 32–36)
MCV RBC AUTO: 89 FL (ref 82–98)
MCV RBC AUTO: 89 FL (ref 82–98)
MIN VOL: 8.4
MODE: ABNORMAL
MONOCYTES # BLD AUTO: 1.1 K/UL (ref 0.3–1)
MONOCYTES # BLD AUTO: 1.2 K/UL (ref 0.3–1)
MONOCYTES NFR BLD: 5.3 % (ref 4–15)
MONOCYTES NFR BLD: 7.5 % (ref 4–15)
NEUTROPHILS # BLD AUTO: 18.7 K/UL (ref 1.8–7.7)
NEUTROPHILS # BLD AUTO: 8.2 K/UL (ref 1.8–7.7)
NEUTROPHILS NFR BLD: 58.8 % (ref 38–73)
NEUTROPHILS NFR BLD: 84.8 % (ref 38–73)
NRBC BLD-RTO: 0 /100 WBC
NRBC BLD-RTO: 0 /100 WBC
PCO2 BLDA: 41.7 MMHG (ref 35–45)
PCO2 BLDA: 43.2 MMHG (ref 35–45)
PCO2 BLDA: 43.6 MMHG (ref 35–45)
PCO2 BLDA: 47.2 MMHG (ref 35–45)
PCO2 BLDA: 48.5 MMHG (ref 35–45)
PCO2 BLDA: 48.8 MMHG (ref 35–45)
PCO2 BLDA: 50.6 MMHG (ref 35–45)
PCO2 BLDA: 53.7 MMHG (ref 35–45)
PCO2 BLDA: 54.9 MMHG (ref 35–45)
PH SMN: 7.33 [PH] (ref 7.35–7.45)
PH SMN: 7.35 [PH] (ref 7.35–7.45)
PH SMN: 7.38 [PH] (ref 7.35–7.45)
PH SMN: 7.4 [PH] (ref 7.35–7.45)
PH SMN: 7.42 [PH] (ref 7.35–7.45)
PH SMN: 7.44 [PH] (ref 7.35–7.45)
PH SMN: 7.46 [PH] (ref 7.35–7.45)
PHOSPHATE SERPL-MCNC: 3.4 MG/DL (ref 2.7–4.5)
PHOSPHATE SERPL-MCNC: 4.4 MG/DL (ref 2.7–4.5)
PLATELET # BLD AUTO: 171 K/UL (ref 150–350)
PLATELET # BLD AUTO: 252 K/UL (ref 150–350)
PMV BLD AUTO: 11.6 FL (ref 9.2–12.9)
PMV BLD AUTO: 11.8 FL (ref 9.2–12.9)
PO2 BLDA: 100 MMHG (ref 80–100)
PO2 BLDA: 104 MMHG (ref 80–100)
PO2 BLDA: 271 MMHG (ref 80–100)
PO2 BLDA: 333 MMHG (ref 80–100)
PO2 BLDA: 354 MMHG (ref 80–100)
PO2 BLDA: 50 MMHG (ref 40–60)
PO2 BLDA: 69 MMHG (ref 80–100)
PO2 BLDA: 72 MMHG (ref 80–100)
PO2 BLDA: 76 MMHG (ref 80–100)
POC BE: 1 MMOL/L
POC BE: 3 MMOL/L
POC BE: 3 MMOL/L
POC BE: 4 MMOL/L
POC BE: 4 MMOL/L
POC BE: 5 MMOL/L
POC BE: 5 MMOL/L
POC BE: 7 MMOL/L
POC BE: 9 MMOL/L
POC IONIZED CALCIUM: 1.02 MMOL/L (ref 1.06–1.42)
POC IONIZED CALCIUM: 1.03 MMOL/L (ref 1.06–1.42)
POC IONIZED CALCIUM: 1.05 MMOL/L (ref 1.06–1.42)
POC IONIZED CALCIUM: 1.05 MMOL/L (ref 1.06–1.42)
POC IONIZED CALCIUM: 1.14 MMOL/L (ref 1.06–1.42)
POC IONIZED CALCIUM: 1.19 MMOL/L (ref 1.06–1.42)
POC IONIZED CALCIUM: 1.21 MMOL/L (ref 1.06–1.42)
POC SATURATED O2: 100 % (ref 95–100)
POC SATURATED O2: 84 % (ref 95–100)
POC SATURATED O2: 92 % (ref 95–100)
POC SATURATED O2: 94 % (ref 95–100)
POC SATURATED O2: 95 % (ref 95–100)
POC SATURATED O2: 98 % (ref 95–100)
POC SATURATED O2: 98 % (ref 95–100)
POC TCO2: 27 MMOL/L (ref 23–27)
POC TCO2: 28 MMOL/L (ref 23–27)
POC TCO2: 30 MMOL/L (ref 23–27)
POC TCO2: 30 MMOL/L (ref 23–27)
POC TCO2: 31 MMOL/L (ref 23–27)
POC TCO2: 31 MMOL/L (ref 24–29)
POC TCO2: 32 MMOL/L (ref 23–27)
POC TCO2: 32 MMOL/L (ref 23–27)
POC TCO2: 35 MMOL/L (ref 23–27)
POCT GLUCOSE: 123 MG/DL (ref 70–110)
POCT GLUCOSE: 131 MG/DL (ref 70–110)
POCT GLUCOSE: 150 MG/DL (ref 70–110)
POCT GLUCOSE: 151 MG/DL (ref 70–110)
POCT GLUCOSE: 151 MG/DL (ref 70–110)
POCT GLUCOSE: 159 MG/DL (ref 70–110)
POCT GLUCOSE: 162 MG/DL (ref 70–110)
POCT GLUCOSE: 166 MG/DL (ref 70–110)
POCT GLUCOSE: 198 MG/DL (ref 70–110)
POTASSIUM BLD-SCNC: 3.9 MMOL/L (ref 3.5–5.1)
POTASSIUM BLD-SCNC: 4 MMOL/L (ref 3.5–5.1)
POTASSIUM BLD-SCNC: 4.1 MMOL/L (ref 3.5–5.1)
POTASSIUM BLD-SCNC: 4.2 MMOL/L (ref 3.5–5.1)
POTASSIUM BLD-SCNC: 4.3 MMOL/L (ref 3.5–5.1)
POTASSIUM BLD-SCNC: 4.6 MMOL/L (ref 3.5–5.1)
POTASSIUM BLD-SCNC: 4.7 MMOL/L (ref 3.5–5.1)
POTASSIUM SERPL-SCNC: 3.6 MMOL/L (ref 3.5–5.1)
POTASSIUM SERPL-SCNC: 3.9 MMOL/L (ref 3.5–5.1)
POTASSIUM SERPL-SCNC: 3.9 MMOL/L (ref 3.5–5.1)
PROT SERPL-MCNC: 6.2 G/DL (ref 6–8.4)
PROTHROMBIN TIME: 12 SEC (ref 9–12.5)
RBC # BLD AUTO: 4.22 M/UL (ref 4.6–6.2)
RBC # BLD AUTO: 5.12 M/UL (ref 4.6–6.2)
SAMPLE: ABNORMAL
SAMPLE: NORMAL
SITE: ABNORMAL
SITE: NORMAL
SODIUM BLD-SCNC: 136 MMOL/L (ref 136–145)
SODIUM BLD-SCNC: 137 MMOL/L (ref 136–145)
SODIUM BLD-SCNC: 137 MMOL/L (ref 136–145)
SODIUM BLD-SCNC: 138 MMOL/L (ref 136–145)
SODIUM BLD-SCNC: 139 MMOL/L (ref 136–145)
SODIUM SERPL-SCNC: 139 MMOL/L (ref 136–145)
SODIUM SERPL-SCNC: 140 MMOL/L (ref 136–145)
SP02: 97
SPONT RATE: 12
WBC # BLD AUTO: 13.92 K/UL (ref 3.9–12.7)
WBC # BLD AUTO: 22.09 K/UL (ref 3.9–12.7)

## 2020-09-09 PROCEDURE — 27201037 HC PRESSURE MONITORING SET UP

## 2020-09-09 PROCEDURE — 37799 UNLISTED PX VASCULAR SURGERY: CPT

## 2020-09-09 PROCEDURE — 36000713 HC OR TIME LEV V EA ADD 15 MIN: Performed by: THORACIC SURGERY (CARDIOTHORACIC VASCULAR SURGERY)

## 2020-09-09 PROCEDURE — 85610 PROTHROMBIN TIME: CPT

## 2020-09-09 PROCEDURE — 64450 PERIPHERAL BLOCK: ICD-10-PCS | Mod: 50,59,, | Performed by: ANESTHESIOLOGY

## 2020-09-09 PROCEDURE — 84100 ASSAY OF PHOSPHORUS: CPT

## 2020-09-09 PROCEDURE — 84295 ASSAY OF SERUM SODIUM: CPT

## 2020-09-09 PROCEDURE — 33517 CABG ARTERY-VEIN SINGLE: CPT | Mod: GC,,, | Performed by: THORACIC SURGERY (CARDIOTHORACIC VASCULAR SURGERY)

## 2020-09-09 PROCEDURE — 93325 PR DOPPLER COLOR FLOW VELOCITY MAP: ICD-10-PCS | Mod: 26,59,, | Performed by: ANESTHESIOLOGY

## 2020-09-09 PROCEDURE — 85520 HEPARIN ASSAY: CPT

## 2020-09-09 PROCEDURE — 20000000 HC ICU ROOM

## 2020-09-09 PROCEDURE — 63600175 PHARM REV CODE 636 W HCPCS: Performed by: HOSPITALIST

## 2020-09-09 PROCEDURE — 85014 HEMATOCRIT: CPT

## 2020-09-09 PROCEDURE — 84132 ASSAY OF SERUM POTASSIUM: CPT

## 2020-09-09 PROCEDURE — 83605 ASSAY OF LACTIC ACID: CPT

## 2020-09-09 PROCEDURE — 27202608 HC CANNULA, MISC

## 2020-09-09 PROCEDURE — P9045 ALBUMIN (HUMAN), 5%, 250 ML: HCPCS | Mod: JG | Performed by: THORACIC SURGERY (CARDIOTHORACIC VASCULAR SURGERY)

## 2020-09-09 PROCEDURE — 36620 INSERTION CATHETER ARTERY: CPT | Mod: 59,,, | Performed by: ANESTHESIOLOGY

## 2020-09-09 PROCEDURE — 33517 PR CABG, ARTERY-VEIN, SINGLE: ICD-10-PCS | Mod: GC,,, | Performed by: THORACIC SURGERY (CARDIOTHORACIC VASCULAR SURGERY)

## 2020-09-09 PROCEDURE — 25000003 PHARM REV CODE 250: Performed by: STUDENT IN AN ORGANIZED HEALTH CARE EDUCATION/TRAINING PROGRAM

## 2020-09-09 PROCEDURE — 33533 PR CABG, ARTERIAL, SINGLE: ICD-10-PCS | Mod: GC,,, | Performed by: THORACIC SURGERY (CARDIOTHORACIC VASCULAR SURGERY)

## 2020-09-09 PROCEDURE — 80048 BASIC METABOLIC PNL TOTAL CA: CPT

## 2020-09-09 PROCEDURE — 27000175 HC ADULT BYPASS PUMP

## 2020-09-09 PROCEDURE — 80053 COMPREHEN METABOLIC PANEL: CPT

## 2020-09-09 PROCEDURE — 82803 BLOOD GASES ANY COMBINATION: CPT

## 2020-09-09 PROCEDURE — 27000190 HC CPAP FULL FACE MASK W/VALVE

## 2020-09-09 PROCEDURE — 33508 PR ENDOSCOPY W/VIDEO-ASST VEIN HARVEST,CABG: ICD-10-PCS | Mod: GC,,, | Performed by: THORACIC SURGERY (CARDIOTHORACIC VASCULAR SURGERY)

## 2020-09-09 PROCEDURE — 93320 PR DOPPLER ECHO HEART,COMPLETE: ICD-10-PCS | Mod: 26,59,, | Performed by: ANESTHESIOLOGY

## 2020-09-09 PROCEDURE — D9220A PRA ANESTHESIA: ICD-10-PCS | Mod: ,,, | Performed by: ANESTHESIOLOGY

## 2020-09-09 PROCEDURE — 36556 PR INSERT NON-TUNNEL CV CATH 5+ YRS OLD: ICD-10-PCS | Mod: 59,,, | Performed by: ANESTHESIOLOGY

## 2020-09-09 PROCEDURE — 63600175 PHARM REV CODE 636 W HCPCS: Performed by: STUDENT IN AN ORGANIZED HEALTH CARE EDUCATION/TRAINING PROGRAM

## 2020-09-09 PROCEDURE — 84100 ASSAY OF PHOSPHORUS: CPT | Mod: 91

## 2020-09-09 PROCEDURE — 36556 INSERT NON-TUNNEL CV CATH: CPT | Mod: 59,,, | Performed by: ANESTHESIOLOGY

## 2020-09-09 PROCEDURE — 36592 COLLECT BLOOD FROM PICC: CPT

## 2020-09-09 PROCEDURE — 76937 PR  US GUIDE, VASCULAR ACCESS: ICD-10-PCS | Mod: 26,,, | Performed by: ANESTHESIOLOGY

## 2020-09-09 PROCEDURE — 37000008 HC ANESTHESIA 1ST 15 MINUTES: Performed by: THORACIC SURGERY (CARDIOTHORACIC VASCULAR SURGERY)

## 2020-09-09 PROCEDURE — 93325 DOPPLER ECHO COLOR FLOW MAPG: CPT | Mod: 26,59,, | Performed by: ANESTHESIOLOGY

## 2020-09-09 PROCEDURE — 33533 CABG ARTERIAL SINGLE: CPT | Mod: GC,,, | Performed by: THORACIC SURGERY (CARDIOTHORACIC VASCULAR SURGERY)

## 2020-09-09 PROCEDURE — 93320 DOPPLER ECHO COMPLETE: CPT | Mod: 26,59,, | Performed by: ANESTHESIOLOGY

## 2020-09-09 PROCEDURE — 27000445 HC TEMPORARY PACEMAKER LEADS

## 2020-09-09 PROCEDURE — 36620 ARTERIAL: ICD-10-PCS | Mod: 59,,, | Performed by: ANESTHESIOLOGY

## 2020-09-09 PROCEDURE — 64450 NJX AA&/STRD OTHER PN/BRANCH: CPT | Mod: 50,59,, | Performed by: ANESTHESIOLOGY

## 2020-09-09 PROCEDURE — C1729 CATH, DRAINAGE: HCPCS | Performed by: THORACIC SURGERY (CARDIOTHORACIC VASCULAR SURGERY)

## 2020-09-09 PROCEDURE — 63600175 PHARM REV CODE 636 W HCPCS: Performed by: THORACIC SURGERY (CARDIOTHORACIC VASCULAR SURGERY)

## 2020-09-09 PROCEDURE — 33508 ENDOSCOPIC VEIN HARVEST: CPT | Mod: GC,,, | Performed by: THORACIC SURGERY (CARDIOTHORACIC VASCULAR SURGERY)

## 2020-09-09 PROCEDURE — 82800 BLOOD PH: CPT

## 2020-09-09 PROCEDURE — 93312 ECHO TRANSESOPHAGEAL: CPT | Mod: 26,59,, | Performed by: ANESTHESIOLOGY

## 2020-09-09 PROCEDURE — 63600175 PHARM REV CODE 636 W HCPCS: Mod: JG | Performed by: STUDENT IN AN ORGANIZED HEALTH CARE EDUCATION/TRAINING PROGRAM

## 2020-09-09 PROCEDURE — 27200953 HC CARDIOPLEGIA SYSTEM

## 2020-09-09 PROCEDURE — 94660 CPAP INITIATION&MGMT: CPT

## 2020-09-09 PROCEDURE — 36000712 HC OR TIME LEV V 1ST 15 MIN: Performed by: THORACIC SURGERY (CARDIOTHORACIC VASCULAR SURGERY)

## 2020-09-09 PROCEDURE — 27100088 HC CELL SAVER

## 2020-09-09 PROCEDURE — 37000009 HC ANESTHESIA EA ADD 15 MINS: Performed by: THORACIC SURGERY (CARDIOTHORACIC VASCULAR SURGERY)

## 2020-09-09 PROCEDURE — 83735 ASSAY OF MAGNESIUM: CPT | Mod: 91

## 2020-09-09 PROCEDURE — 85025 COMPLETE CBC W/AUTO DIFF WBC: CPT

## 2020-09-09 PROCEDURE — S0020 INJECTION, BUPIVICAINE HYDRO: HCPCS | Performed by: STUDENT IN AN ORGANIZED HEALTH CARE EDUCATION/TRAINING PROGRAM

## 2020-09-09 PROCEDURE — 85730 THROMBOPLASTIN TIME PARTIAL: CPT | Mod: 91

## 2020-09-09 PROCEDURE — P9045 ALBUMIN (HUMAN), 5%, 250 ML: HCPCS | Mod: JG | Performed by: STUDENT IN AN ORGANIZED HEALTH CARE EDUCATION/TRAINING PROGRAM

## 2020-09-09 PROCEDURE — 93312 TEE: ICD-10-PCS | Mod: 26,59,, | Performed by: ANESTHESIOLOGY

## 2020-09-09 PROCEDURE — 27201423 OPTIME MED/SURG SUP & DEVICES STERILE SUPPLY: Performed by: THORACIC SURGERY (CARDIOTHORACIC VASCULAR SURGERY)

## 2020-09-09 PROCEDURE — 83735 ASSAY OF MAGNESIUM: CPT

## 2020-09-09 PROCEDURE — 99291 CRITICAL CARE FIRST HOUR: CPT | Mod: ,,, | Performed by: ANESTHESIOLOGY

## 2020-09-09 PROCEDURE — D9220A PRA ANESTHESIA: Mod: ,,, | Performed by: ANESTHESIOLOGY

## 2020-09-09 PROCEDURE — 76937 US GUIDE VASCULAR ACCESS: CPT | Mod: 26,,, | Performed by: ANESTHESIOLOGY

## 2020-09-09 PROCEDURE — 99900035 HC TECH TIME PER 15 MIN (STAT)

## 2020-09-09 PROCEDURE — 85730 THROMBOPLASTIN TIME PARTIAL: CPT

## 2020-09-09 PROCEDURE — 27000221 HC OXYGEN, UP TO 24 HOURS

## 2020-09-09 PROCEDURE — 82330 ASSAY OF CALCIUM: CPT

## 2020-09-09 PROCEDURE — 99291 PR CRITICAL CARE, E/M 30-74 MINUTES: ICD-10-PCS | Mod: ,,, | Performed by: ANESTHESIOLOGY

## 2020-09-09 PROCEDURE — 94761 N-INVAS EAR/PLS OXIMETRY MLT: CPT

## 2020-09-09 PROCEDURE — 25000003 PHARM REV CODE 250: Performed by: THORACIC SURGERY (CARDIOTHORACIC VASCULAR SURGERY)

## 2020-09-09 RX ORDER — ASPIRIN 300 MG/1
300 SUPPOSITORY RECTAL EVERY 6 HOURS PRN
Status: DISCONTINUED | OUTPATIENT
Start: 2020-09-09 | End: 2020-09-14 | Stop reason: HOSPADM

## 2020-09-09 RX ORDER — ALBUMIN HUMAN 50 G/1000ML
SOLUTION INTRAVENOUS CONTINUOUS PRN
Status: DISCONTINUED | OUTPATIENT
Start: 2020-09-09 | End: 2020-09-09

## 2020-09-09 RX ORDER — HEPARIN SODIUM 1000 [USP'U]/ML
INJECTION, SOLUTION INTRAVENOUS; SUBCUTANEOUS
Status: DISCONTINUED | OUTPATIENT
Start: 2020-09-09 | End: 2020-09-09 | Stop reason: HOSPADM

## 2020-09-09 RX ORDER — NICARDIPINE HYDROCHLORIDE 0.2 MG/ML
INJECTION INTRAVENOUS
Status: DISPENSED
Start: 2020-09-09 | End: 2020-09-10

## 2020-09-09 RX ORDER — ALBUMIN HUMAN 50 G/1000ML
25 SOLUTION INTRAVENOUS ONCE
Status: COMPLETED | OUTPATIENT
Start: 2020-09-09 | End: 2020-09-10

## 2020-09-09 RX ORDER — CEFAZOLIN SODIUM 1 G/3ML
INJECTION, POWDER, FOR SOLUTION INTRAMUSCULAR; INTRAVENOUS
Status: DISCONTINUED | OUTPATIENT
Start: 2020-09-09 | End: 2020-09-09

## 2020-09-09 RX ORDER — ACETAMINOPHEN 500 MG
1000 TABLET ORAL
Status: COMPLETED | OUTPATIENT
Start: 2020-09-09 | End: 2020-09-09

## 2020-09-09 RX ORDER — NEOSTIGMINE METHYLSULFATE 0.5 MG/ML
INJECTION, SOLUTION INTRAVENOUS
Status: DISCONTINUED | OUTPATIENT
Start: 2020-09-09 | End: 2020-09-09

## 2020-09-09 RX ORDER — POTASSIUM CHLORIDE 14.9 MG/ML
60 INJECTION INTRAVENOUS
Status: DISCONTINUED | OUTPATIENT
Start: 2020-09-09 | End: 2020-09-14

## 2020-09-09 RX ORDER — SUCCINYLCHOLINE CHLORIDE 20 MG/ML
INJECTION INTRAMUSCULAR; INTRAVENOUS
Status: DISCONTINUED | OUTPATIENT
Start: 2020-09-09 | End: 2020-09-09

## 2020-09-09 RX ORDER — TRANEXAMIC ACID 100 MG/ML
INJECTION, SOLUTION INTRAVENOUS CONTINUOUS PRN
Status: DISCONTINUED | OUTPATIENT
Start: 2020-09-09 | End: 2020-09-09

## 2020-09-09 RX ORDER — PAPAVERINE HYDROCHLORIDE 30 MG/ML
INJECTION INTRAMUSCULAR; INTRAVENOUS
Status: DISCONTINUED | OUTPATIENT
Start: 2020-09-09 | End: 2020-09-09 | Stop reason: HOSPADM

## 2020-09-09 RX ORDER — ALBUMIN HUMAN 50 G/1000ML
25 SOLUTION INTRAVENOUS ONCE
Status: COMPLETED | OUTPATIENT
Start: 2020-09-09 | End: 2020-09-09

## 2020-09-09 RX ORDER — MIDAZOLAM HYDROCHLORIDE 1 MG/ML
INJECTION, SOLUTION INTRAMUSCULAR; INTRAVENOUS
Status: DISCONTINUED | OUTPATIENT
Start: 2020-09-09 | End: 2020-09-09

## 2020-09-09 RX ORDER — NOREPINEPHRINE BITARTRATE 1 MG/ML
INJECTION, SOLUTION INTRAVENOUS
Status: DISCONTINUED | OUTPATIENT
Start: 2020-09-09 | End: 2020-09-09

## 2020-09-09 RX ORDER — KETAMINE HCL IN 0.9 % NACL 50 MG/5 ML
SYRINGE (ML) INTRAVENOUS
Status: DISCONTINUED | OUTPATIENT
Start: 2020-09-09 | End: 2020-09-09

## 2020-09-09 RX ORDER — HYDROMORPHONE HYDROCHLORIDE 1 MG/ML
1 INJECTION, SOLUTION INTRAMUSCULAR; INTRAVENOUS; SUBCUTANEOUS EVERY 4 HOURS PRN
Status: DISCONTINUED | OUTPATIENT
Start: 2020-09-09 | End: 2020-09-10

## 2020-09-09 RX ORDER — POTASSIUM CHLORIDE 29.8 MG/ML
80 INJECTION INTRAVENOUS
Status: DISCONTINUED | OUTPATIENT
Start: 2020-09-09 | End: 2020-09-14

## 2020-09-09 RX ORDER — NICARDIPINE HYDROCHLORIDE 0.2 MG/ML
1 INJECTION INTRAVENOUS CONTINUOUS
Status: DISCONTINUED | OUTPATIENT
Start: 2020-09-09 | End: 2020-09-11

## 2020-09-09 RX ORDER — ONDANSETRON 2 MG/ML
INJECTION INTRAMUSCULAR; INTRAVENOUS
Status: DISCONTINUED | OUTPATIENT
Start: 2020-09-09 | End: 2020-09-09

## 2020-09-09 RX ORDER — PROTAMINE SULFATE 10 MG/ML
INJECTION, SOLUTION INTRAVENOUS
Status: DISCONTINUED | OUTPATIENT
Start: 2020-09-09 | End: 2020-09-09

## 2020-09-09 RX ORDER — POTASSIUM CHLORIDE 7.45 MG/ML
40 INJECTION INTRAVENOUS ONCE
Status: COMPLETED | OUTPATIENT
Start: 2020-09-09 | End: 2020-09-09

## 2020-09-09 RX ORDER — MAGNESIUM SULFATE HEPTAHYDRATE 40 MG/ML
2 INJECTION, SOLUTION INTRAVENOUS
Status: DISCONTINUED | OUTPATIENT
Start: 2020-09-09 | End: 2020-09-14

## 2020-09-09 RX ORDER — PHENYLEPHRINE HYDROCHLORIDE 10 MG/ML
INJECTION INTRAVENOUS
Status: DISCONTINUED | OUTPATIENT
Start: 2020-09-09 | End: 2020-09-09

## 2020-09-09 RX ORDER — FENTANYL CITRATE 50 UG/ML
INJECTION, SOLUTION INTRAMUSCULAR; INTRAVENOUS
Status: DISCONTINUED | OUTPATIENT
Start: 2020-09-09 | End: 2020-09-09

## 2020-09-09 RX ORDER — SODIUM CHLORIDE 9 MG/ML
INJECTION, SOLUTION INTRAVENOUS CONTINUOUS PRN
Status: DISCONTINUED | OUTPATIENT
Start: 2020-09-09 | End: 2020-09-09

## 2020-09-09 RX ORDER — MAGNESIUM SULFATE HEPTAHYDRATE 40 MG/ML
4 INJECTION, SOLUTION INTRAVENOUS
Status: DISCONTINUED | OUTPATIENT
Start: 2020-09-09 | End: 2020-09-14

## 2020-09-09 RX ORDER — BUPIVACAINE HYDROCHLORIDE 5 MG/ML
INJECTION, SOLUTION EPIDURAL; INTRACAUDAL
Status: DISCONTINUED | OUTPATIENT
Start: 2020-09-09 | End: 2020-09-09

## 2020-09-09 RX ORDER — POTASSIUM CHLORIDE 750 MG/1
30 CAPSULE, EXTENDED RELEASE ORAL ONCE
Status: COMPLETED | OUTPATIENT
Start: 2020-09-09 | End: 2020-09-09

## 2020-09-09 RX ORDER — POTASSIUM CHLORIDE 29.8 MG/ML
40 INJECTION INTRAVENOUS
Status: DISCONTINUED | OUTPATIENT
Start: 2020-09-09 | End: 2020-09-14

## 2020-09-09 RX ORDER — LANOLIN ALCOHOL/MO/W.PET/CERES
1200 CREAM (GRAM) TOPICAL ONCE
Status: COMPLETED | OUTPATIENT
Start: 2020-09-09 | End: 2020-09-09

## 2020-09-09 RX ORDER — ROCURONIUM BROMIDE 10 MG/ML
INJECTION, SOLUTION INTRAVENOUS
Status: DISCONTINUED | OUTPATIENT
Start: 2020-09-09 | End: 2020-09-09

## 2020-09-09 RX ORDER — HYDROMORPHONE HYDROCHLORIDE 1 MG/ML
0.5 INJECTION, SOLUTION INTRAMUSCULAR; INTRAVENOUS; SUBCUTANEOUS EVERY 4 HOURS PRN
Status: DISCONTINUED | OUTPATIENT
Start: 2020-09-09 | End: 2020-09-10

## 2020-09-09 RX ORDER — PROPOFOL 10 MG/ML
VIAL (ML) INTRAVENOUS
Status: DISCONTINUED | OUTPATIENT
Start: 2020-09-09 | End: 2020-09-09

## 2020-09-09 RX ADMIN — PROTAMINE SULFATE 300 MG: 10 INJECTION, SOLUTION INTRAVENOUS at 01:09

## 2020-09-09 RX ADMIN — Medication 30 MG: at 08:09

## 2020-09-09 RX ADMIN — MIDAZOLAM HYDROCHLORIDE 2 MG: 1 INJECTION, SOLUTION INTRAMUSCULAR; INTRAVENOUS at 07:09

## 2020-09-09 RX ADMIN — NICARDIPINE HYDROCHLORIDE 2.5 MG/HR: 0.2 INJECTION, SOLUTION INTRAVENOUS at 03:09

## 2020-09-09 RX ADMIN — PHENYLEPHRINE HYDROCHLORIDE 100 MCG: 10 INJECTION INTRAVENOUS at 08:09

## 2020-09-09 RX ADMIN — PHENYLEPHRINE HYDROCHLORIDE 100 MCG: 10 INJECTION INTRAVENOUS at 10:09

## 2020-09-09 RX ADMIN — HYDROMORPHONE HYDROCHLORIDE 0.5 MG: 1 INJECTION, SOLUTION INTRAMUSCULAR; INTRAVENOUS; SUBCUTANEOUS at 07:09

## 2020-09-09 RX ADMIN — ROCURONIUM BROMIDE 20 MG: 10 INJECTION, SOLUTION INTRAVENOUS at 12:09

## 2020-09-09 RX ADMIN — FENTANYL CITRATE 100 MCG: 50 INJECTION, SOLUTION INTRAMUSCULAR; INTRAVENOUS at 08:09

## 2020-09-09 RX ADMIN — SODIUM CHLORIDE, SODIUM GLUCONATE, SODIUM ACETATE, POTASSIUM CHLORIDE, MAGNESIUM CHLORIDE, SODIUM PHOSPHATE, DIBASIC, AND POTASSIUM PHOSPHATE: .53; .5; .37; .037; .03; .012; .00082 INJECTION, SOLUTION INTRAVENOUS at 10:09

## 2020-09-09 RX ADMIN — ROCURONIUM BROMIDE 30 MG: 10 INJECTION, SOLUTION INTRAVENOUS at 11:09

## 2020-09-09 RX ADMIN — ASPIRIN 300 MG: 300 SUPPOSITORY RECTAL at 08:09

## 2020-09-09 RX ADMIN — FENTANYL CITRATE 200 MCG: 50 INJECTION, SOLUTION INTRAMUSCULAR; INTRAVENOUS at 09:09

## 2020-09-09 RX ADMIN — ACETAMINOPHEN 1000 MG: 500 TABLET ORAL at 07:09

## 2020-09-09 RX ADMIN — PHENYLEPHRINE HYDROCHLORIDE 100 MCG: 10 INJECTION INTRAVENOUS at 11:09

## 2020-09-09 RX ADMIN — FENTANYL CITRATE 50 MCG: 50 INJECTION, SOLUTION INTRAMUSCULAR; INTRAVENOUS at 01:09

## 2020-09-09 RX ADMIN — PROPOFOL 50 MG: 10 INJECTION, EMULSION INTRAVENOUS at 09:09

## 2020-09-09 RX ADMIN — SODIUM CHLORIDE, SODIUM GLUCONATE, SODIUM ACETATE, POTASSIUM CHLORIDE, MAGNESIUM CHLORIDE, SODIUM PHOSPHATE, DIBASIC, AND POTASSIUM PHOSPHATE: .53; .5; .37; .037; .03; .012; .00082 INJECTION, SOLUTION INTRAVENOUS at 07:09

## 2020-09-09 RX ADMIN — ROCURONIUM BROMIDE 50 MG: 10 INJECTION, SOLUTION INTRAVENOUS at 09:09

## 2020-09-09 RX ADMIN — PROTAMINE SULFATE 50 MG: 10 INJECTION, SOLUTION INTRAVENOUS at 01:09

## 2020-09-09 RX ADMIN — SODIUM CHLORIDE: 0.9 INJECTION, SOLUTION INTRAVENOUS at 01:09

## 2020-09-09 RX ADMIN — ALBUMIN (HUMAN) 25 G: 12.5 SOLUTION INTRAVENOUS at 03:09

## 2020-09-09 RX ADMIN — POTASSIUM CHLORIDE 10 MEQ: 7.46 INJECTION, SOLUTION INTRAVENOUS at 04:09

## 2020-09-09 RX ADMIN — ONDANSETRON 4 MG: 2 INJECTION INTRAMUSCULAR; INTRAVENOUS at 01:09

## 2020-09-09 RX ADMIN — BUPIVACAINE HYDROCHLORIDE 30 ML: 5 INJECTION, SOLUTION EPIDURAL; INTRACAUDAL; PERINEURAL at 12:09

## 2020-09-09 RX ADMIN — PROPOFOL 100 MG: 10 INJECTION, EMULSION INTRAVENOUS at 08:09

## 2020-09-09 RX ADMIN — ROCURONIUM BROMIDE 5 MG: 10 INJECTION, SOLUTION INTRAVENOUS at 08:09

## 2020-09-09 RX ADMIN — POTASSIUM CHLORIDE 30 MEQ: 750 CAPSULE, EXTENDED RELEASE ORAL at 06:09

## 2020-09-09 RX ADMIN — POTASSIUM CHLORIDE 40 MEQ: 29.8 INJECTION, SOLUTION INTRAVENOUS at 06:09

## 2020-09-09 RX ADMIN — PHENYLEPHRINE HYDROCHLORIDE 100 MCG: 10 INJECTION INTRAVENOUS at 09:09

## 2020-09-09 RX ADMIN — MORPHINE SULFATE 2 MG: 2 INJECTION, SOLUTION INTRAMUSCULAR; INTRAVENOUS at 03:09

## 2020-09-09 RX ADMIN — PROPOFOL 50 MG: 10 INJECTION, EMULSION INTRAVENOUS at 01:09

## 2020-09-09 RX ADMIN — CEFAZOLIN 2 G: 330 INJECTION, POWDER, FOR SOLUTION INTRAMUSCULAR; INTRAVENOUS at 01:09

## 2020-09-09 RX ADMIN — NOREPINEPHRINE BITARTRATE 16 MCG: 1 INJECTION, SOLUTION, CONCENTRATE INTRAVENOUS at 01:09

## 2020-09-09 RX ADMIN — ALBUMIN (HUMAN): 12.5 SOLUTION INTRAVENOUS at 01:09

## 2020-09-09 RX ADMIN — PHENYLEPHRINE HYDROCHLORIDE 200 MCG: 10 INJECTION INTRAVENOUS at 08:09

## 2020-09-09 RX ADMIN — SUCCINYLCHOLINE CHLORIDE 180 MG: 20 INJECTION, SOLUTION INTRAMUSCULAR; INTRAVENOUS at 08:09

## 2020-09-09 RX ADMIN — TRANEXAMIC ACID 1 MG/KG/HR: 100 INJECTION, SOLUTION INTRAVENOUS at 11:09

## 2020-09-09 RX ADMIN — ALBUMIN (HUMAN) 25 G: 12.5 SOLUTION INTRAVENOUS at 07:09

## 2020-09-09 RX ADMIN — Medication 20 MG: at 09:09

## 2020-09-09 RX ADMIN — HEPARIN SODIUM 35000 UNITS: 1000 INJECTION, SOLUTION INTRAVENOUS; SUBCUTANEOUS at 11:09

## 2020-09-09 RX ADMIN — SODIUM CHLORIDE 5 UNITS/HR: 9 INJECTION, SOLUTION INTRAVENOUS at 09:09

## 2020-09-09 RX ADMIN — NOREPINEPHRINE BITARTRATE 0.04 MCG/KG/MIN: 1 INJECTION, SOLUTION, CONCENTRATE INTRAVENOUS at 09:09

## 2020-09-09 RX ADMIN — NEOSTIGMINE METHYLSULFATE 5 MG: 0.5 INJECTION INTRAVENOUS at 02:09

## 2020-09-09 RX ADMIN — Medication 1200 MG: at 06:09

## 2020-09-09 RX ADMIN — FENTANYL CITRATE 100 MCG: 50 INJECTION, SOLUTION INTRAMUSCULAR; INTRAVENOUS at 09:09

## 2020-09-09 RX ADMIN — CALCIUM CHLORIDE 0.2 MG: 100 INJECTION, SOLUTION INTRAVENOUS at 01:09

## 2020-09-09 RX ADMIN — CEFAZOLIN 2 G: 330 INJECTION, POWDER, FOR SOLUTION INTRAMUSCULAR; INTRAVENOUS at 09:09

## 2020-09-09 RX ADMIN — CALCIUM CHLORIDE 0.8 MG: 100 INJECTION, SOLUTION INTRAVENOUS at 01:09

## 2020-09-09 RX ADMIN — SODIUM CHLORIDE 8 UNITS/HR: 9 INJECTION, SOLUTION INTRAVENOUS at 06:09

## 2020-09-09 NOTE — ANESTHESIA PROCEDURE NOTES
Central Line    Diagnosis: acs  Patient location during procedure: done in OR  Procedure start time: 9/9/2020 9:01 AM  Timeout: 9/9/2020 9:00 AM  Procedure end time: 9/9/2020 9:15 AM    Staffing  Authorizing Provider: Betty Perales MD  Performing Provider: Konstantin Benitez MD    Staffing  Anesthesiologist: Betty Perales MD  Resident/CRNA: Konstantin Benitez MD  Performed: resident/CRNA   Anesthesiologist was present at the time of the procedure.  Preanesthetic Checklist  Completed: patient identified, site marked, surgical consent, pre-op evaluation, timeout performed, IV checked, risks and benefits discussed, monitors and equipment checked and anesthesia consent given  Indication   Indication: hemodynamic monitoring, vascular access, med administration     Anesthesia   general anesthesia    Central Line   Skin Prep: skin prepped with ChloraPrep, skin prep agent completely dried prior to procedure  maximum sterile barriers used during central venous catheter insertion  hand hygiene performed prior to central venous catheter insertion  Location: right, internal jugular.   Catheter type: quad lumen  Catheter Size: 8.5 Fr  Ultrasound: vascular probe with ultrasound  Vessel Caliber: patent  Needle advanced into vessel with real time Ultrasound guidance.  Image recorded and saved.   Manometry: Venous cannualation confirmed by visual estimation of blood vessel pressure using manometry.  Insertion Attempts: 2   Securement:line sutured, chlorhexidine patch, sterile dressing applied and blood return through all ports    Post-Procedure     Guidewire Guidewire removed intact. Guidewire removed intact, verified with nurse.

## 2020-09-09 NOTE — PROGRESS NOTES
[facility]  Critical Care - Surgery  Progress Note    Patient Name: Bc Garcia  MRN: [id number]  Admission Date: 9/3/2020  Hospital Length of Stay: 6 days  Code Status: Full Code  Attending Provider: Ej Vazquez MD  Primary Care Provider: Primary Doctor No   Principal Problem: NSTEMI (non-ST elevated myocardial infarction)    Subjective:     Hospital/ICU Course:  No notes on file    Interval History/Significant Events: No acute events. Completed workup for surgery yesterday. NPO since midnight. Ready for surgery this am.     Follow-up For: Procedure(s) (LRB):  Left heart cath, radial, 9 am (Left)    Post-Operative Day: 5 Days Post-Op    Objective:     Vital Signs (Most Recent):  Temp: 98 °F (36.7 °C) (09/08/20 2300)  Pulse: 62 (09/09/20 0200)  Resp: 16 (09/09/20 0325)  BP: (!) 129/59 (09/09/20 0200)  SpO2: 96 % (09/09/20 0200) Vital Signs (24h Range):  Temp:  [98 °F (36.7 °C)-98.6 °F (37 °C)] 98 °F (36.7 °C)  Pulse:  [61-81] 62  Resp:  [5-36] 16  SpO2:  [89 %-99 %] 96 %  BP: (107-147)/(58-84) 129/59     Weight: 126 kg (277 lb 12.5 oz)  Body mass index is 41.02 kg/m².      Intake/Output Summary (Last 24 hours) at 9/9/2020 0513  Last data filed at 9/9/2020 0000  Gross per 24 hour   Intake 358.04 ml   Output 3975 ml   Net -3616.96 ml       Physical Exam  Vitals signs and nursing note reviewed.   Constitutional:       Appearance: Normal appearance.   Eyes:      Extraocular Movements: Extraocular movements intact.   Cardiovascular:      Rate and Rhythm: Normal rate and regular rhythm.      Pulses: Normal pulses.   Pulmonary:      Effort: Pulmonary effort is normal. No respiratory distress.   Abdominal:      General: There is no distension.      Tenderness: There is no abdominal tenderness.      Comments: Obese  No scars   Skin:     General: Skin is warm and dry.      Capillary Refill: Capillary refill takes less than 2 seconds.   Neurological:      General: No focal deficit present.      Mental  Status: He is alert.   Psychiatric:         Mood and Affect: Mood normal.         Vents:  Oxygen Concentration (%): 28 (09/09/20 0029)    Lines/Drains/Airways     Peripheral Intravenous Line                 Peripheral IV - Single Lumen 09/04/20 1200 20 G Anterior;Left Hand 4 days         Peripheral IV - Single Lumen 09/08/20 0045 20 G Anterior;Left;Proximal Forearm 1 day         Peripheral IV - Single Lumen 09/08/20 1100 18 G Right Hand less than 1 day                Significant Labs:    CBC/Anemia Profile:  Recent Labs   Lab 09/08/20  0340 09/09/20  0314   WBC 12.81* 13.92*   HGB 13.4* 14.2   HCT 41.7 45.4    252   MCV 88 89   RDW 13.2 13.3        Chemistries:  Recent Labs   Lab 09/08/20 0340 09/09/20  0314    139   K 3.9 3.6    100   CO2 25 26   BUN 9 10   CREATININE 0.8 0.9   CALCIUM 9.0 9.4   MG 1.7 1.8   PHOS 3.9 4.4       ABGs: No results for input(s): PH, PCO2, HCO3, POCSATURATED, BE in the last 48 hours.  Coagulation:   Recent Labs   Lab 09/09/20 0314   APTT 43.4*     All pertinent labs within the past 24 hours have been reviewed.    Significant Imaging:  I have reviewed and interpreted all pertinent imaging results/findings within the past 24 hours.    Assessment/Plan:     * NSTEMI (non-ST elevated myocardial infarction)  NSTEMI  70M with NSTEMI and hx of DM, HTN, HLD presents for CABG evaluation. Will be undergoing CABG x2 today 9/9/2020    Neuro:  Alert and oriented   PRN pain meds    CV:  Hep gtt to be held on call to OR  Nitroglycerin gtt  Metoprolol 50mg BID  ASA 81 daily  Atorvastatin 80mg daily  Plavix held  Carotid U/S (9/6) w/ 49% stenosis bilaterally.     Pulm:   Satting well on 2L NC  PFTs and CT chest 9/8    Renal:  Normal BUN/Cr  Diuresis yesterday with excellent UOP     GI:   NPO for OR this am    Heme/ID:  Hgb OK, WBC slightly elevated, will monitor for now  COVID negative 9/7    Endo:  SSI              PPx:   Feeding: NPO  Analgesia/Sedation: PRN pain meds / No sedation  indicated  Thromboembolic prevention: heparin gtt - will be held on call to OR  HOB >30: yes  Stress Ulcer ppx: will likely start post op  Glucose control: Critical care goal 140-180 g/dl, SSI          Dispo: To OR this am, then back to SICU            Critical secondary to Patient has a condition that poses threat to life and bodily function: NSTEMI. Going for CABGx2 today     Critical care was time spent personally by me on the following activities: development of treatment plan with patient or surrogate and bedside caregivers, discussions with consultants, evaluation of patient's response to treatment, examination of patient, ordering and performing treatments and interventions, ordering and review of laboratory studies, ordering and review of radiographic studies, pulse oximetry, re-evaluation of patient's condition.  This critical care time did not overlap with that of any other provider or involve time for any procedures.     Jammie Molina MD  Critical Care - Surgery  [facility]

## 2020-09-09 NOTE — PLAN OF CARE
Dx: NSTEMI (non-ST elevated myocardial infarction)     Shift Events: One episode of chest pain, resolved with morphine. No acute events     Goals of Care: CABG today     Neuro: AAO x4     Vital Signs: /68, SpO2 97% on 2L NC, afebrile, HR 69 NSR     Respiratory: 2L O2 NC at night     Diet: NPO except sips with meds     Gtts: Heparin and nitroglycerin     Urine Output: Voids Spontaneously, lasix given     Skin: Hibiclens shower. Foams to heels and sacrum applied. No skin breakdown noted. Positions independently.

## 2020-09-09 NOTE — ANESTHESIA PROCEDURE NOTES
Intubation  Performed by: Konstantin Benitez MD  Authorized by: Betty Perales MD     Intubation:     Induction:  Intravenous    Intubated:  Postinduction    Mask Ventilation:  Easy mask    Attempts:  1    Attempted By:  Resident anesthesiologist    Method of Intubation:  Direct    Blade:  Bib 3    Laryngeal View Grade: Grade I - full view of chords      Difficult Airway Encountered?: No      Complications:  None    Airway Device:  Oral endotracheal tube    Airway Device Size:  7.5    Style/Cuff Inflation:  Cuffed (inflated to minimal occlusive pressure)    Secured at:  The lips    Placement Verified By:  Capnometry    Complicating Factors:  None    Findings Post-Intubation:  BS equal bilateral

## 2020-09-09 NOTE — SUBJECTIVE & OBJECTIVE
Interval History/Significant Events: No acute events. Completed workup for surgery yesterday. NPO since midnight. Ready for surgery this am.     Follow-up For: Procedure(s) (LRB):  Left heart cath, radial, 9 am (Left)    Post-Operative Day: 5 Days Post-Op    Objective:     Vital Signs (Most Recent):  Temp: 98 °F (36.7 °C) (09/08/20 2300)  Pulse: 62 (09/09/20 0200)  Resp: 16 (09/09/20 0325)  BP: (!) 129/59 (09/09/20 0200)  SpO2: 96 % (09/09/20 0200) Vital Signs (24h Range):  Temp:  [98 °F (36.7 °C)-98.6 °F (37 °C)] 98 °F (36.7 °C)  Pulse:  [61-81] 62  Resp:  [5-36] 16  SpO2:  [89 %-99 %] 96 %  BP: (107-147)/(58-84) 129/59     Weight: 126 kg (277 lb 12.5 oz)  Body mass index is 41.02 kg/m².      Intake/Output Summary (Last 24 hours) at 9/9/2020 0513  Last data filed at 9/9/2020 0000  Gross per 24 hour   Intake 358.04 ml   Output 3975 ml   Net -3616.96 ml       Physical Exam  Vitals signs and nursing note reviewed.   Constitutional:       Appearance: Normal appearance.   Eyes:      Extraocular Movements: Extraocular movements intact.   Cardiovascular:      Rate and Rhythm: Normal rate and regular rhythm.      Pulses: Normal pulses.   Pulmonary:      Effort: Pulmonary effort is normal. No respiratory distress.   Abdominal:      General: There is no distension.      Tenderness: There is no abdominal tenderness.      Comments: Obese  No scars   Skin:     General: Skin is warm and dry.      Capillary Refill: Capillary refill takes less than 2 seconds.   Neurological:      General: No focal deficit present.      Mental Status: He is alert.   Psychiatric:         Mood and Affect: Mood normal.         Vents:  Oxygen Concentration (%): 28 (09/09/20 0029)    Lines/Drains/Airways     Peripheral Intravenous Line                 Peripheral IV - Single Lumen 09/04/20 1200 20 G Anterior;Left Hand 4 days         Peripheral IV - Single Lumen 09/08/20 0045 20 G Anterior;Left;Proximal Forearm 1 day         Peripheral IV - Single Lumen  09/08/20 1100 18 G Right Hand less than 1 day                Significant Labs:    CBC/Anemia Profile:  Recent Labs   Lab 09/08/20  0340 09/09/20  0314   WBC 12.81* 13.92*   HGB 13.4* 14.2   HCT 41.7 45.4    252   MCV 88 89   RDW 13.2 13.3        Chemistries:  Recent Labs   Lab 09/08/20 0340 09/09/20  0314    139   K 3.9 3.6    100   CO2 25 26   BUN 9 10   CREATININE 0.8 0.9   CALCIUM 9.0 9.4   MG 1.7 1.8   PHOS 3.9 4.4       ABGs: No results for input(s): PH, PCO2, HCO3, POCSATURATED, BE in the last 48 hours.  Coagulation:   Recent Labs   Lab 09/09/20 0314   APTT 43.4*     All pertinent labs within the past 24 hours have been reviewed.    Significant Imaging:  I have reviewed and interpreted all pertinent imaging results/findings within the past 24 hours.

## 2020-09-09 NOTE — TRANSFER OF CARE
"Anesthesia Transfer of Care Note    Patient: Bc Garcia    Procedure(s) Performed: Procedure(s) (LRB):  CORONARY ARTERY BYPASS GRAFT (CABG), left internal mammary artery harvest (N/A)  SURGICAL PROCUREMENT, VEIN, ENDOSCOPIC (Left)  CLOSURE, WOUND, STERNUM (N/A)  APPLICATION, WOUND VAC, Prevena 40 x 5    Patient location: ICU    Anesthesia Type: general    Transport from OR: Transported from OR on 6-10 L/min O2 by face mask with adequate spontaneous ventilation    Post pain: adequate analgesia    Post assessment: no apparent anesthetic complications    Post vital signs: stable    Level of consciousness: awake    Nausea/Vomiting: no nausea/vomiting    Complications: none    Transfer of care protocol was followed      Last vitals:   Visit Vitals  /69 (BP Location: Right arm, Patient Position: Lying)   Pulse 71   Temp 36.6 °C (97.9 °F) (Oral)   Resp 18   Ht 5' 9" (1.753 m)   Wt 126 kg (277 lb 12.5 oz)   SpO2 (!) 93%   BMI 41.02 kg/m²     "

## 2020-09-09 NOTE — ANESTHESIA PROCEDURE NOTES
LIZZY    Diagnosis: CAD  Patient location during procedure: OR  Procedure start time: 9/9/2020 10:12 AM  Timeout: 9/9/2020 10:12 AM  Procedure end time: 9/9/2020 10:12 AM  Exam type: Baseline  Staffing  Anesthesiologist: Betty Perales MD  Performed: anesthesiologist   Preanesthetic Checklist  Completed: patient identified, surgical consent, pre-op evaluation, timeout performed, risks and benefits discussed, monitors and equipment checked, anesthesia consent given, oxygen available, suction available, hand hygiene performed and patient being monitored  Setup & Induction  Patient preparation: bite block inserted  Probe Insertion: easy  Exam: complete      Findings  Impression  Other Findings  Normal biventricular size and function.  Mild central MR.  No other valvular abnormalities. No PFO. No RWMA  EF 60%    Post Bypass:  Normal cardiac function, EF 60%  Mild MR    Probe Removal      Exam     Left Heart  Left Atruim: normal and normal (men 3.0-4.0; women 2.7-3.8)    Left Ventricle: cm, normal (men 4.2-5.9; women 3.8-5.2)    LVAD:no  Estimated Ejection Fraction: > 55% normal  Regional Wall Abnormalities: no RWMA        Left Ventricle Diastolic Function  E Velocity: 75.5 cm/s  A Velocity: 55.6 cm/s  E/A Ratio: 1.36, normal (1-2)    Right Heart  Right Ventricle Function: normal  Right Atria: cm and normal    Intra Atrial Septum  PFO: no shunt by color flow doppler  no IAS aneurysm  lipomatous hypertrophy  no Atrial Septal Defect (Asd)    Right Ventricle, Free Wall Thickness: normal    Aortic Valve:  Stenosis: none  Morphology: trileaflet  Regurgitation: no aortic valve regurgitation     Mitral Valve:  Morphology:normal  Prolapse: none  Flail: no flail  Jet Description: mild    Tricuspid Valve:  Morphology: normal  Regurgitation: none    Pulmonic Valve:  Morphology:normal  Regurgitation(color flow): none    Great Vessels  Ascending Aorta Atherosclerosis: 2=mild dz (<3mm)  Aortic Arch Atherosclerosis: 2=mild dz  (<3mm)  Descending Aorta Atherosclerosis: 2=mild dz (<3mm)      Effusions  Effusions: none    Summary  Findings discussed with surgeon.    Other Findings   Normal biventricular size and function.  Mild central MR.  No other valvular abnormalities. No PFO. No RWMA  EF 60%    Post Bypass:  Normal cardiac function, EF 60%  Mild MR

## 2020-09-09 NOTE — ANESTHESIA PROCEDURE NOTES
Arterial    Diagnosis: ACS    Patient location during procedure: done in OR  Procedure start time: 9/9/2020 8:16 AM  Timeout: 9/9/2020 8:15 AM  Procedure end time: 9/9/2020 8:20 AM    Staffing  Authorizing Provider: Betty Perales MD  Performing Provider: Konstantin Benitez MD    Anesthesiologist was present at the time of the procedure.    Preanesthetic Checklist  Completed: patient identified, site marked, surgical consent, pre-op evaluation, timeout performed, IV checked, risks and benefits discussed, monitors and equipment checked and anesthesia consent givenArterial  Skin Prep: chlorhexidine gluconate  Local Infiltration: lidocaine  Orientation: left  Location: radial  Catheter placement by Anatomical landmarks. Heme positive aspiration all ports.Insertion Attempts: 1  Assessment  Dressing: secured with tape and tegaderm  Patient: Tolerated well

## 2020-09-09 NOTE — ASSESSMENT & PLAN NOTE
NSTEMI  70M with NSTEMI and hx of DM, HTN, HLD presents for CABG evaluation. Will be undergoing CABG x2 today 9/9/2020    Neuro:  Alert and oriented   PRN pain meds    CV:  Hep gtt to be held on call to OR  Nitroglycerin gtt  Metoprolol 50mg BID  ASA 81 daily  Atorvastatin 80mg daily  Plavix held  Carotid U/S (9/6) w/ 49% stenosis bilaterally.     Pulm:   Satting well on 2L NC  PFTs and CT chest 9/8    Renal:  Normal BUN/Cr  Diuresis yesterday with excellent UOP     GI:   NPO for OR this am    Heme/ID:  Hgb OK, WBC slightly elevated, will monitor for now  COVID negative 9/7    Endo:  SSI              PPx:   Feeding: NPO  Analgesia/Sedation: PRN pain meds / No sedation indicated  Thromboembolic prevention: heparin gtt - will be held on call to OR  HOB >30: yes  Stress Ulcer ppx: will likely start post op  Glucose control: Critical care goal 140-180 g/dl, SSI          Dispo: To OR this am, then back to SICU

## 2020-09-09 NOTE — CARE UPDATE
Critical Care Surgery    Mr. Garcia underwent CABG x 2 earlier today. Represented to the SICU extubated and off pressors. Had recently been reversed and still recovering from anesthesia.       Neuro/Psych:   -- Sedation: not indicated  -- Pain: PRN IV pain meds. Will transition to PO once clear to swallow             Cards:   -- s/p CABG x2 9/9  -- HDS off pressors  -- MAP goal 60-80, SBP <140      Pulm:   -- Goal O2 sat > 88%  -- NIPPV with BiPAP      Renal:  -- Landers in place, strict I/O  -- Normal renal function pre-op  -- will continue to monitor      FEN / GI:   -- Did not receive any blood products in the OR  -- Replace lytes as needed  -- Nutrition: NPO      ID:   -- Afebrile and WBC WNL  -- Love-op abx      Heme/Onc:   -- H/H stable   -- Daily CBC      Endo:   -- Gluc goal 140-180  -- insulin gtt      PPx:   Feeding: NPO  Analgesia/Sedation: PRN IV pain meds/ not indicated  Thromboembolic prevention:   HOB >30: yes  Stress Ulcer ppx: famotidine  Glucose control: Critical care goal 140-180 g/dl, ISS    Lines/Drains/Airway: BiPAP, PIV, art line, RIJ CVC x2, provena vac, landers, CT x3 (2 meds, 1 pleural), pacer wires      Dispo/Code Status/Palliative:   -- SICU / Full code

## 2020-09-09 NOTE — NURSING
Pt disconnected from wall monitor and connected to portable monitor. Pt transferred to OR - portable telemetry and ambu bag present.     Daughter updated on pt transfer to OR.

## 2020-09-09 NOTE — NURSING
Pt transferred back to SICU [id number] and reconnected to wall monitor{ and Wall O2. Pt reassessed, findings concur with prior assessments. Vital signs remained appropriate for this particular patient during transport and during of procedure.    Daughter updated on POC at this time.

## 2020-09-09 NOTE — NURSING
"      SICU PLAN OF CARE NOTE    Dx: NSTEMI (non-ST elevated myocardial infarction)    Shift Events: CABG x 2 performed today; extubated in OR; 500ml Albumin given    Goals of Care: MAP 60-80, monitor CT output, I/O's    Neuro: AAO x4, Follows Commands and Moves All Extremities    Vital Signs: /64 (BP Location: Right arm, Patient Position: Lying)   Pulse 86   Temp 98 °F (36.7 °C) (Oral)   Resp 17   Ht 5' 9" (1.753 m)   Wt 126 kg (277 lb 12.5 oz)   SpO2 97%   BMI 41.02 kg/m²     Respiratory: BiPAP/CPAP 50%     Diet: NPO    Gtts: Insulin and Nicardipine    Urine Output: Urinary Catheter 150-175 cc/hour    Drains: Chest Tube, total output 180 cc /  shift    Prevena to midsternal incision, seal intact    Labs/Accuchecks: Accuchecks q1     Skin: Midsternal incision; left fem incision, left leg incision; left leg wrapped in compression no skin breakdown noted.     Wife and daughter updated on POC and all questions answered at this time. PM RN will assume care.   Dr. Vazquez updated at shift change on current VS, I/O's, gtts, labs, ABG. Orders for 500cc albumin to be given over 5 hours and aspirin suppository to be given. Plan to keep pt on BiPAP overnight. PM nurse to call Dr. Vazquez later in shift with updates.       "

## 2020-09-09 NOTE — BRIEF OP NOTE
[facility]  Brief Operative Note    SUMMARY     Surgery Date: 9/9/2020     Surgeon(s) and Role:     * Ej Vazquez MD - Primary     * Clifford Bucio MD - Resident - Assisting    Pre-op Diagnosis:  NSTEMI (non-ST elevation myocardial infarction) [I21.4]    Post-op Diagnosis:  Post-Op Diagnosis Codes:     * NSTEMI (non-ST elevation myocardial infarction) [I21.4]    Procedure(s) (LRB):  CORONARY ARTERY BYPASS GRAFT (CABG), left internal mammary artery harvest (N/A)  SURGICAL PROCUREMENT, VEIN, ENDOSCOPIC (Left)  CLOSURE, WOUND, STERNUM (N/A)  APPLICATION, WOUND VAC, Prevena 40 x 5    Anesthesia: General    Description of Procedure: CABG x2; LIMA-LAD, SVG-OM2    Description of the findings of the procedure: See op note    Estimated Blood Loss: 100mL    Estimated Blood Loss has been documented.         Specimens:   Specimen (12h ago, onward)    None          [id number]

## 2020-09-09 NOTE — NURSING
Pt complained of numbness in right hand. Palpable pulses and capillary refill less than 3 seconds. Hand  in right hand strong. MD Molina notified of numbness. WCTM.

## 2020-09-09 NOTE — ANESTHESIA PROCEDURE NOTES
Peripheral Block    Patient location during procedure: OR   Block not for primary anesthetic.  Reason for block: at surgeon's request and post-op pain management   Post-op Pain Location: parasternal  Start time: 9/9/2020 9:15 AM  Timeout: 9/9/2020 9:14 AM   End time: 9/9/2020 9:20 AM    Staffing  Authorizing Provider: Betty Perales MD  Performing Provider: Konstantin Benitez MD    Preanesthetic Checklist  Completed: patient identified, site marked, surgical consent, pre-op evaluation, timeout performed, IV checked, risks and benefits discussed and monitors and equipment checked  Peripheral Block  Patient position: supine  Prep: ChloraPrep  Patient monitoring: heart rate, cardiac monitor, continuous pulse ox, continuous capnometry and frequent blood pressure checks  Block type: Transversus thoracis  Laterality: bilateral  Injection technique: single shot  Needle  Needle localization: ultrasound guidance     Assessment  Injection assessment: negative aspiration and negative parasthesia  Heart rate change: no  Slow fractionated injection: yes  Additional Notes  30 cc bupi 0.5% with epi, 15 cc per side

## 2020-09-10 PROBLEM — Z95.1 S/P CABG (CORONARY ARTERY BYPASS GRAFT): Status: ACTIVE | Noted: 2020-09-10

## 2020-09-10 LAB
ALBUMIN SERPL BCP-MCNC: 3.8 G/DL (ref 3.5–5.2)
ALLENS TEST: ABNORMAL
ALP SERPL-CCNC: 71 U/L (ref 55–135)
ALT SERPL W/O P-5'-P-CCNC: 25 U/L (ref 10–44)
ANION GAP SERPL CALC-SCNC: 12 MMOL/L (ref 8–16)
ANION GAP SERPL CALC-SCNC: 6 MMOL/L (ref 8–16)
APTT BLDCRRT: 32.9 SEC (ref 21–32)
AST SERPL-CCNC: 40 U/L (ref 10–40)
BASOPHILS # BLD AUTO: 0.03 K/UL (ref 0–0.2)
BASOPHILS NFR BLD: 0.2 % (ref 0–1.9)
BILIRUB SERPL-MCNC: 0.7 MG/DL (ref 0.1–1)
BUN SERPL-MCNC: 10 MG/DL (ref 8–23)
BUN SERPL-MCNC: 14 MG/DL (ref 8–23)
CALCIUM SERPL-MCNC: 8.5 MG/DL (ref 8.7–10.5)
CALCIUM SERPL-MCNC: 8.9 MG/DL (ref 8.7–10.5)
CHLORIDE SERPL-SCNC: 101 MMOL/L (ref 95–110)
CHLORIDE SERPL-SCNC: 103 MMOL/L (ref 95–110)
CO2 SERPL-SCNC: 22 MMOL/L (ref 23–29)
CO2 SERPL-SCNC: 26 MMOL/L (ref 23–29)
CREAT SERPL-MCNC: 0.8 MG/DL (ref 0.5–1.4)
CREAT SERPL-MCNC: 1.1 MG/DL (ref 0.5–1.4)
DELSYS: ABNORMAL
DIFFERENTIAL METHOD: ABNORMAL
EOSINOPHIL # BLD AUTO: 0 K/UL (ref 0–0.5)
EOSINOPHIL NFR BLD: 0 % (ref 0–8)
EP: 5
ERYTHROCYTE [DISTWIDTH] IN BLOOD BY AUTOMATED COUNT: 13.5 % (ref 11.5–14.5)
EST. GFR  (AFRICAN AMERICAN): >60 ML/MIN/1.73 M^2
EST. GFR  (AFRICAN AMERICAN): >60 ML/MIN/1.73 M^2
EST. GFR  (NON AFRICAN AMERICAN): >60 ML/MIN/1.73 M^2
EST. GFR  (NON AFRICAN AMERICAN): >60 ML/MIN/1.73 M^2
FIO2: 50
GLUCOSE SERPL-MCNC: 199 MG/DL (ref 70–110)
GLUCOSE SERPL-MCNC: 287 MG/DL (ref 70–110)
HCO3 UR-SCNC: 25.2 MMOL/L (ref 24–28)
HCT VFR BLD AUTO: 36.3 % (ref 40–54)
HGB BLD-MCNC: 11.2 G/DL (ref 14–18)
IMM GRANULOCYTES # BLD AUTO: 0.08 K/UL (ref 0–0.04)
IMM GRANULOCYTES NFR BLD AUTO: 0.5 % (ref 0–0.5)
IP: 12
LYMPHOCYTES # BLD AUTO: 1.3 K/UL (ref 1–4.8)
LYMPHOCYTES NFR BLD: 7.6 % (ref 18–48)
MAGNESIUM SERPL-MCNC: 2.1 MG/DL (ref 1.6–2.6)
MAGNESIUM SERPL-MCNC: 2.1 MG/DL (ref 1.6–2.6)
MCH RBC QN AUTO: 27.9 PG (ref 27–31)
MCHC RBC AUTO-ENTMCNC: 30.9 G/DL (ref 32–36)
MCV RBC AUTO: 91 FL (ref 82–98)
MODE: ABNORMAL
MONOCYTES # BLD AUTO: 1.4 K/UL (ref 0.3–1)
MONOCYTES NFR BLD: 8 % (ref 4–15)
NEUTROPHILS # BLD AUTO: 14.5 K/UL (ref 1.8–7.7)
NEUTROPHILS NFR BLD: 83.7 % (ref 38–73)
NRBC BLD-RTO: 0 /100 WBC
PCO2 BLDA: 43.4 MMHG (ref 35–45)
PH SMN: 7.37 [PH] (ref 7.35–7.45)
PHOSPHATE SERPL-MCNC: 4 MG/DL (ref 2.7–4.5)
PLATELET # BLD AUTO: 179 K/UL (ref 150–350)
PMV BLD AUTO: 11.8 FL (ref 9.2–12.9)
PO2 BLDA: 73 MMHG (ref 80–100)
POC BE: 0 MMOL/L
POC SATURATED O2: 94 % (ref 95–100)
POC TCO2: 27 MMOL/L (ref 23–27)
POCT GLUCOSE: 147 MG/DL (ref 70–110)
POCT GLUCOSE: 155 MG/DL (ref 70–110)
POCT GLUCOSE: 155 MG/DL (ref 70–110)
POCT GLUCOSE: 158 MG/DL (ref 70–110)
POCT GLUCOSE: 166 MG/DL (ref 70–110)
POCT GLUCOSE: 177 MG/DL (ref 70–110)
POCT GLUCOSE: 179 MG/DL (ref 70–110)
POCT GLUCOSE: 181 MG/DL (ref 70–110)
POCT GLUCOSE: 186 MG/DL (ref 70–110)
POCT GLUCOSE: 188 MG/DL (ref 70–110)
POCT GLUCOSE: 189 MG/DL (ref 70–110)
POCT GLUCOSE: 200 MG/DL (ref 70–110)
POCT GLUCOSE: 212 MG/DL (ref 70–110)
POCT GLUCOSE: 220 MG/DL (ref 70–110)
POCT GLUCOSE: 233 MG/DL (ref 70–110)
POCT GLUCOSE: 238 MG/DL (ref 70–110)
POCT GLUCOSE: 290 MG/DL (ref 70–110)
POTASSIUM SERPL-SCNC: 4.6 MMOL/L (ref 3.5–5.1)
POTASSIUM SERPL-SCNC: 4.9 MMOL/L (ref 3.5–5.1)
PROT SERPL-MCNC: 6.4 G/DL (ref 6–8.4)
RBC # BLD AUTO: 4.01 M/UL (ref 4.6–6.2)
SAMPLE: ABNORMAL
SITE: ABNORMAL
SODIUM SERPL-SCNC: 133 MMOL/L (ref 136–145)
SODIUM SERPL-SCNC: 137 MMOL/L (ref 136–145)
WBC # BLD AUTO: 17.29 K/UL (ref 3.9–12.7)

## 2020-09-10 PROCEDURE — 82803 BLOOD GASES ANY COMBINATION: CPT

## 2020-09-10 PROCEDURE — 37799 UNLISTED PX VASCULAR SURGERY: CPT

## 2020-09-10 PROCEDURE — 25000003 PHARM REV CODE 250: Performed by: STUDENT IN AN ORGANIZED HEALTH CARE EDUCATION/TRAINING PROGRAM

## 2020-09-10 PROCEDURE — 83735 ASSAY OF MAGNESIUM: CPT

## 2020-09-10 PROCEDURE — 84100 ASSAY OF PHOSPHORUS: CPT

## 2020-09-10 PROCEDURE — 99900035 HC TECH TIME PER 15 MIN (STAT)

## 2020-09-10 PROCEDURE — 99223 1ST HOSP IP/OBS HIGH 75: CPT | Mod: ,,, | Performed by: NURSE PRACTITIONER

## 2020-09-10 PROCEDURE — 25000003 PHARM REV CODE 250: Performed by: SURGERY

## 2020-09-10 PROCEDURE — 63600175 PHARM REV CODE 636 W HCPCS: Performed by: NURSE PRACTITIONER

## 2020-09-10 PROCEDURE — 99291 PR CRITICAL CARE, E/M 30-74 MINUTES: ICD-10-PCS | Mod: ,,, | Performed by: ANESTHESIOLOGY

## 2020-09-10 PROCEDURE — 94761 N-INVAS EAR/PLS OXIMETRY MLT: CPT

## 2020-09-10 PROCEDURE — 80048 BASIC METABOLIC PNL TOTAL CA: CPT

## 2020-09-10 PROCEDURE — 20000000 HC ICU ROOM

## 2020-09-10 PROCEDURE — 85025 COMPLETE CBC W/AUTO DIFF WBC: CPT

## 2020-09-10 PROCEDURE — 63600175 PHARM REV CODE 636 W HCPCS: Performed by: STUDENT IN AN ORGANIZED HEALTH CARE EDUCATION/TRAINING PROGRAM

## 2020-09-10 PROCEDURE — 83735 ASSAY OF MAGNESIUM: CPT | Mod: 91

## 2020-09-10 PROCEDURE — 25000003 PHARM REV CODE 250: Performed by: THORACIC SURGERY (CARDIOTHORACIC VASCULAR SURGERY)

## 2020-09-10 PROCEDURE — 94660 CPAP INITIATION&MGMT: CPT

## 2020-09-10 PROCEDURE — 99223 PR INITIAL HOSPITAL CARE,LEVL III: ICD-10-PCS | Mod: ,,, | Performed by: NURSE PRACTITIONER

## 2020-09-10 PROCEDURE — 99291 CRITICAL CARE FIRST HOUR: CPT | Mod: ,,, | Performed by: ANESTHESIOLOGY

## 2020-09-10 PROCEDURE — 85730 THROMBOPLASTIN TIME PARTIAL: CPT

## 2020-09-10 PROCEDURE — 80053 COMPREHEN METABOLIC PANEL: CPT

## 2020-09-10 RX ORDER — OXYCODONE HYDROCHLORIDE 5 MG/1
5 TABLET ORAL EVERY 4 HOURS PRN
Status: DISCONTINUED | OUTPATIENT
Start: 2020-09-10 | End: 2020-09-14 | Stop reason: HOSPADM

## 2020-09-10 RX ORDER — GLUCAGON 1 MG
1 KIT INJECTION
Status: DISCONTINUED | OUTPATIENT
Start: 2020-09-10 | End: 2020-09-13

## 2020-09-10 RX ORDER — INSULIN ASPART 100 [IU]/ML
0-10 INJECTION, SOLUTION INTRAVENOUS; SUBCUTANEOUS
Status: DISCONTINUED | OUTPATIENT
Start: 2020-09-10 | End: 2020-09-13

## 2020-09-10 RX ORDER — GLIPIZIDE 5 MG/1
5 TABLET ORAL 2 TIMES DAILY
Status: ON HOLD | COMMUNITY
End: 2020-09-14 | Stop reason: HOSPADM

## 2020-09-10 RX ORDER — IBUPROFEN 200 MG
24 TABLET ORAL
Status: DISCONTINUED | OUTPATIENT
Start: 2020-09-10 | End: 2020-09-13

## 2020-09-10 RX ORDER — HYDRALAZINE HYDROCHLORIDE 25 MG/1
25 TABLET, FILM COATED ORAL EVERY 8 HOURS PRN
Status: DISCONTINUED | OUTPATIENT
Start: 2020-09-10 | End: 2020-09-10

## 2020-09-10 RX ORDER — AMLODIPINE BESYLATE 5 MG/1
5 TABLET ORAL ONCE
Status: COMPLETED | OUTPATIENT
Start: 2020-09-10 | End: 2020-09-10

## 2020-09-10 RX ORDER — IBUPROFEN 200 MG
16 TABLET ORAL
Status: DISCONTINUED | OUTPATIENT
Start: 2020-09-10 | End: 2020-09-13

## 2020-09-10 RX ORDER — INSULIN ASPART 100 [IU]/ML
4-6 INJECTION, SOLUTION INTRAVENOUS; SUBCUTANEOUS
Status: DISCONTINUED | OUTPATIENT
Start: 2020-09-10 | End: 2020-09-11

## 2020-09-10 RX ORDER — ROSUVASTATIN CALCIUM 20 MG/1
20 TABLET, COATED ORAL DAILY
COMMUNITY

## 2020-09-10 RX ORDER — CARVEDILOL 12.5 MG/1
12.5 TABLET ORAL 2 TIMES DAILY
COMMUNITY

## 2020-09-10 RX ORDER — METOPROLOL TARTRATE 25 MG/1
12.5 TABLET ORAL 2 TIMES DAILY
Status: DISCONTINUED | OUTPATIENT
Start: 2020-09-10 | End: 2020-09-10

## 2020-09-10 RX ORDER — FUROSEMIDE 10 MG/ML
10 INJECTION INTRAMUSCULAR; INTRAVENOUS 2 TIMES DAILY
Status: DISCONTINUED | OUTPATIENT
Start: 2020-09-10 | End: 2020-09-11

## 2020-09-10 RX ORDER — ACETAMINOPHEN 325 MG/1
650 TABLET ORAL 3 TIMES DAILY
Status: DISCONTINUED | OUTPATIENT
Start: 2020-09-10 | End: 2020-09-14 | Stop reason: HOSPADM

## 2020-09-10 RX ORDER — CEFAZOLIN SODIUM 1 G/3ML
2 INJECTION, POWDER, FOR SOLUTION INTRAMUSCULAR; INTRAVENOUS
Status: COMPLETED | OUTPATIENT
Start: 2020-09-10 | End: 2020-09-10

## 2020-09-10 RX ORDER — AMLODIPINE BESYLATE 5 MG/1
5 TABLET ORAL DAILY
Status: DISCONTINUED | OUTPATIENT
Start: 2020-09-10 | End: 2020-09-11

## 2020-09-10 RX ORDER — HYDRALAZINE HYDROCHLORIDE 20 MG/ML
10 INJECTION INTRAMUSCULAR; INTRAVENOUS EVERY 6 HOURS PRN
Status: DISCONTINUED | OUTPATIENT
Start: 2020-09-10 | End: 2020-09-10

## 2020-09-10 RX ORDER — OXYCODONE HYDROCHLORIDE 5 MG/1
10 TABLET ORAL EVERY 4 HOURS PRN
Status: DISCONTINUED | OUTPATIENT
Start: 2020-09-10 | End: 2020-09-14 | Stop reason: HOSPADM

## 2020-09-10 RX ORDER — LOSARTAN POTASSIUM 100 MG/1
100 TABLET ORAL DAILY
COMMUNITY

## 2020-09-10 RX ORDER — CARVEDILOL 25 MG/1
25 TABLET ORAL 2 TIMES DAILY
Status: DISCONTINUED | OUTPATIENT
Start: 2020-09-10 | End: 2020-09-11

## 2020-09-10 RX ORDER — HYDRALAZINE HYDROCHLORIDE 25 MG/1
25 TABLET, FILM COATED ORAL 3 TIMES DAILY
Status: DISCONTINUED | OUTPATIENT
Start: 2020-09-10 | End: 2020-09-11

## 2020-09-10 RX ORDER — CARVEDILOL 6.25 MG/1
12.5 TABLET ORAL 2 TIMES DAILY
Status: DISCONTINUED | OUTPATIENT
Start: 2020-09-10 | End: 2020-09-10

## 2020-09-10 RX ADMIN — AMLODIPINE BESYLATE 5 MG: 5 TABLET ORAL at 02:09

## 2020-09-10 RX ADMIN — FUROSEMIDE 10 MG: 10 INJECTION, SOLUTION INTRAMUSCULAR; INTRAVENOUS at 10:09

## 2020-09-10 RX ADMIN — CEFAZOLIN 2 G: 1 INJECTION, POWDER, FOR SOLUTION INTRAMUSCULAR; INTRAVENOUS at 08:09

## 2020-09-10 RX ADMIN — ATORVASTATIN CALCIUM 80 MG: 40 TABLET, FILM COATED ORAL at 08:09

## 2020-09-10 RX ADMIN — ONDANSETRON 8 MG: 2 INJECTION INTRAMUSCULAR; INTRAVENOUS at 02:09

## 2020-09-10 RX ADMIN — CARVEDILOL 12.5 MG: 6.25 TABLET, FILM COATED ORAL at 10:09

## 2020-09-10 RX ADMIN — HYDRALAZINE HYDROCHLORIDE 10 MG: 20 INJECTION INTRAMUSCULAR; INTRAVENOUS at 12:09

## 2020-09-10 RX ADMIN — CEFAZOLIN 2 G: 1 INJECTION, POWDER, FOR SOLUTION INTRAMUSCULAR; INTRAVENOUS at 11:09

## 2020-09-10 RX ADMIN — HYDROMORPHONE HYDROCHLORIDE 0.5 MG: 1 INJECTION, SOLUTION INTRAMUSCULAR; INTRAVENOUS; SUBCUTANEOUS at 01:09

## 2020-09-10 RX ADMIN — INSULIN ASPART 4 UNITS: 100 INJECTION, SOLUTION INTRAVENOUS; SUBCUTANEOUS at 05:09

## 2020-09-10 RX ADMIN — ASPIRIN 81 MG CHEWABLE TABLET 81 MG: 81 TABLET CHEWABLE at 08:09

## 2020-09-10 RX ADMIN — ACETAMINOPHEN 650 MG: 325 TABLET ORAL at 08:09

## 2020-09-10 RX ADMIN — CEFAZOLIN 2 G: 1 INJECTION, POWDER, FOR SOLUTION INTRAMUSCULAR; INTRAVENOUS at 05:09

## 2020-09-10 RX ADMIN — AMLODIPINE BESYLATE 5 MG: 5 TABLET ORAL at 08:09

## 2020-09-10 RX ADMIN — ACETAMINOPHEN 650 MG: 325 TABLET ORAL at 02:09

## 2020-09-10 RX ADMIN — HYDROMORPHONE HYDROCHLORIDE 0.5 MG: 1 INJECTION, SOLUTION INTRAMUSCULAR; INTRAVENOUS; SUBCUTANEOUS at 05:09

## 2020-09-10 RX ADMIN — INSULIN ASPART 2 UNITS: 100 INJECTION, SOLUTION INTRAVENOUS; SUBCUTANEOUS at 10:09

## 2020-09-10 RX ADMIN — CARVEDILOL 25 MG: 25 TABLET, FILM COATED ORAL at 08:09

## 2020-09-10 RX ADMIN — HYDRALAZINE HYDROCHLORIDE 25 MG: 25 TABLET, FILM COATED ORAL at 08:09

## 2020-09-10 RX ADMIN — NICARDIPINE HYDROCHLORIDE 4 MG/HR: 0.2 INJECTION, SOLUTION INTRAVENOUS at 05:09

## 2020-09-10 RX ADMIN — OXYCODONE HYDROCHLORIDE 10 MG: 10 TABLET ORAL at 02:09

## 2020-09-10 RX ADMIN — FUROSEMIDE 10 MG: 10 INJECTION, SOLUTION INTRAMUSCULAR; INTRAVENOUS at 08:09

## 2020-09-10 RX ADMIN — OXYCODONE HYDROCHLORIDE 10 MG: 10 TABLET ORAL at 08:09

## 2020-09-10 NOTE — PROGRESS NOTES
"[facility]  Adult Nutrition  Progress Note    SUMMARY       Recommendations    1.) Advance diet as tolerated to cardiac, diabetic as tolerated once medically appropriate.    Goals: 1.) Pt to meet >75% of estimated energy and protein needs over the course of 7 days.  Nutrition Goal Status: new  Communication of RD Recs: reviewed with RN(POC)    Reason for Assessment    Reason For Assessment: RD follow-up  Diagnosis: (NSTEMI)  Relevant Medical History: HTN, DM, hyperlipidemia  Interdisciplinary Rounds: did not attend  General Information Comments: Pt s/p CABG. Pt extubated and diet initiated. Nsg staff reports pt is tolerating clear liquids. GI- LBM . Pt reports he had 7# weight loss over the course of 1 month due to change in medications. Pt appears well nourished, NFPE not warranted at this time.  Nutrition Discharge Planning: Pt provided literature of diabetic diet on , pt denies nutrition education questions at this time.    Nutrition Risk Screen    Nutrition Risk Screen: no indicators present    Nutrition/Diet History    Spiritual, Cultural Beliefs, Uatsdin Practices, Values that Affect Care: no  Food Allergies: NKFA  Factors Affecting Nutritional Intake: clear liquid diet    Anthropometrics    Temp: 98.9 °F (37.2 °C)  Height Method: Stated  Height: 5' 9" (175.3 cm)  Height (inches): 69 in  Weight Method: Bed Scale  Weight: 129.5 kg (285 lb 7.9 oz)  Weight (lb): 285.5 lb  Ideal Body Weight (IBW), Male: 160 lb  % Ideal Body Weight, Male (lb): 163.14 %  BMI (Calculated): 42.1  BMI Grade: greater than 40 - morbid obesity  Usual Body Weight (UBW), k.4 kg(Per hospital records )  % Usual Body Weight: 109.6       Lab/Procedures/Meds    Pertinent Labs Reviewed: reviewed  Pertinent Labs Comments: WBC 17.29, Hgb 11.2, Hct 36.3, CO2 22, Glucose 199  Pertinent Medications Reviewed: reviewed  Pertinent Medications Comments: Albumin, insulin    Physical Findings/Assessment     Edema 1+ " generalized    Estimated/Assessed Needs    Weight Used For Calorie Calculations: 118 kg (260 lb 2.3 oz)  Energy Calorie Requirements (kcal): 2322  Energy Need Method: [place]-St Jeor(x1.2 PAL)  Protein Requirements: 109-129 (1.5-1.8g/kg IBW)  Weight Used For Protein Calculations: 72.7 kg (160 lb 4.4 oz)(IBW)  Fluid Requirements (mL): 1mL/kcal or per MD recommendations  Estimated Fluid Requirement Method: RDA Method  RDA Method (mL): 2322  CHO Requirement: 290gm      Nutrition Prescription Ordered    Current Diet Order: NPO (9/9)    Evaluation of Received Nutrient/Fluid Intake    I/O: I: 3383; O: 3348; -8L since admission  Energy Calories Required: not meeting needs  Protein Required: not meeting needs  Fluid Required: meeting needs  Tolerance: tolerating  % Intake of Estimated Energy Needs: 8  % Meal Intake: 25    Nutrition Risk    Level of Risk/Frequency of Follow-up: high , 2x weekly    Assessment and Plan        Nutrition Problem  Inadequate energy intake    Related to (etiology):   Clear liquid diet    Signs and Symptoms (as evidenced by):   Clear liquid diet does not meet estimated energy needs.     Interventions(treatment strategy):  1.) Collaboration with other providers    Nutrition Diagnosis Status:   new     Monitor and Evaluation    Food and Nutrient Intake: energy intake, food and beverage intake  Food and Nutrient Adminstration: diet order  Knowledge/Beliefs/Attitudes: food and nutrition knowledge/skill  Anthropometric Measurements: weight, weight change  Biochemical Data, Medical Tests and Procedures: electrolyte and renal panel, gastrointestinal profile, glucose/endocrine profile  Nutrition-Focused Physical Findings: skin, overall appearance       Nutrition Follow-Up    RD Follow-up?: Yes

## 2020-09-10 NOTE — OP NOTE
DATE OF PROCEDURE:   September 9, 2020     PREOPERATIVE DIAGNOSES:  1.  Non-ST elevated MI.  2.   obesity with BMI of 35-39.9  3. Coronary artery disease of the native vessel.  4.  Uncontrolled type 2 diabetes mellitus  5.  Acute coronary syndrome     POSTOPERATIVE DIAGNOSES: SAME       OPERATIONS:  1.  Coronary artery bypass grafting x2 (LIMA-LAD, SVG to OM2).  2.  Takedown of the left internal mammary artery, skeletonized technique.  3.  Endoscopic vein harvest of the saphenous vein graft from the left lower extremity  4.  Placement of wound VAC Prevena 40 x 5 cm in size due to extremely large body habitus and increased risk of wound dehiscence and sternal complications       STAFF SURGEON:  Ej Vazquez M.D.     FIRST ASSISTANT:  Jamie Young     ANESTHESIA:  GETA.     ESTIMATED BLOOD LOSS:  100 mL.     KEY FINDINGS OF THE OPERATION:  1.  Good quality left internal mammary artery.  2.  Preserved cardiac function.  3.  Flow through the saphenous vein graft to OM2 was 70 cc a minute at 120 mmHg pressure       INDICATION OF OPERATION:  This is a 70-year-old male presented with non ST elevated MI to oxygen our [facility].  Patient underwent an angiogram and was found to have severe left main disease.  Patient was transferred for surgical revascularization.    Risks, benefits, and different treatment options were discussed. Pt. Wanted to undergo surgical revascularization. The patient understood all risks and benefits and consented to the operation.     PROCEDURE IN OPERATION:  The patient was brought to the Operating Room and placed in a supine position.  After induction of anesthesia, the area was prepped and draped in the usual sterile fashion.  An upper midline incision was made, which was carried all the way down to the sternum.  Median sternotomy was then performed.  Sternal edges were cauterized.  Chest retractor was placed.  Pericardium was then opened up.  Once that was done, target vessel was  identified.  Target vessel was found to be a decent vessel for bypass.  The left hemisternum was elevated using a Rultract retractor.  The left internal mammary artery was taken down in a skeletonized fashion.   at the same time endoscopic vein harvest of saphenous vein graft from left lower extremity was also initiated.  Systemic heparinization was done where an ACT greater than 450 was obtained.  The distal portion of the left internal mammary artery was ligated and cut.  It was prepared with papaverine to remove any vasospasm.   the saphenous vein graft was also retrieved from the left lower extremity and back table preparation of the vein graft was started.  Systemic heparinization was done 1 ACT greater than 451 obtained cannulation stitches were placed arterial cannula were placed in the ascending aorta and venous cannula was placed in the right atrial appendage.  Retrograde cardioplegia catheter was placed in the coronary sinus followed by an antegrade cardioplegia catheter in the ascending aorta.  Once patient was placed on full cardiopulmonary bypass cross-clamp was applied on the ascending aorta and a cardiac arrest was obtained in diastole using an admixture of antegrade and retrograde cardioplegia.      The posterior portion of the heart was then exposed and OM2 vessel was identified.  Dissection of the OM2 vessel was then and opened up at the anastomotic site.  Saphenous vein graft was brought to field and spatulated and using a 7 0 Prolene stitch in continuous running anastomosis was performed to the OM2 vessel.  Good hemostasis was ensured.  A dose of cardioplegia was administered and 70 cc of flow was noted at 120 mmHg pressure.  Once that was done attention was then directed towards the LAD territory.  The left internal mammary artery had excellent flow and was speculated and prepared for anastomosis.  At the anastomotic site and the LAD was opened up and was found to be a good target.  Using a 7 0  Prolene stitch in continuous running anastomosis was then performed.  Good hemostasis was ensured.  Another dose of cardioplegia was then administered.  Proximal anastomosis of the saphenous vein graft to the ascending aorta was thus initiated.  4 mm aortic punch was used for preparation of the proximal anastomotic site to the ascending aorta.  Using a 5 0 Prolene stitch a continuous running anastomosis was performed.  Once that was achieved dose of hotshot was delivered over 3 minutes and the cross-clamp was removed.  Instant cardiac activity in sinus rhythm was noted.  Ventilation was resumed and all electrolytes were found to be within normal limits along with core body temperature.  Patient was  from cardiopulmonary bypass without any events.  No intracardiac air was noted.  Test dose of protamine was given followed by full dose of protamine to reverse the effects of systemic heparin.  Center does all the various catheters and cannulas were then removed.    Temporary ventricular pacing wires were placed on the surface and brought through separateskin incision.  Two mediastinal and one left pleural chest tube was placed and brought through separate skin incision and connected to Pleur-evac.  Sternum was then approximated by #6 stainless steel wire.  Due to the poor quality of the sternum and the body habitus, a decision was made to put a Prevena skin wound VAC, which was 22 x 5 cm.  It was fashioned in such a way to obtain a good tight seal.  Skin and subcutaneous tissue had been already closed in multiple layers.Retention stitches were placed followed by Wound VAC placement.  Terminal count of needles, sponges, and instrument was found to be correct.     MEDICARE ATTESTATION:  No adequately trained cardiothoracic surgery resident was present for this surgery and I performed all parts of the operation.

## 2020-09-10 NOTE — HPI
Reason for Consult: Management of T2DM, Hyperglycemia     Surgical Procedure and Date: CABG x 2 9/9/20    Lab Results   Component Value Date    HGBA1C 9.4 (H) 09/06/2020     Diabetes diagnosis year: 10 years ago    Home Diabetes Medications: oral medication twice daily (patient does not recall name)     How often checking glucose at home? once every other day   BG readings on regimen: 130-180s  Hypoglycemia on the regimen?  No  Missed doses on regimen?  Yes    Diabetes Complications include:     Hyperglycemia    Complicating diabetes co morbidities:   History of MI      HPI:   Patient is a 70 y.o. male with a diagnosis of HLD, tobacco abuse, obesity, HTN, and T2DM who presented to OSH with NSTEMI. He was transferred to [facility] [place] for CABG evaluation. He is now s/p CABG and admitted to SICU. Endocrinology consulted for management of T2DM.

## 2020-09-10 NOTE — PROGRESS NOTES
[facility]  Critical Care - Surgery  Progress Note    Patient Name: Bc Garcia  MRN: [id number]  Admission Date: 9/3/2020  Hospital Length of Stay: 7 days  Code Status: Full Code  Attending Provider: Ej Vazquez MD  Primary Care Provider: Primary Doctor No   Principal Problem: NSTEMI (non-ST elevated myocardial infarction)    Subjective:     Hospital/ICU Course:  No notes on file    Interval History/Significant Events: No acute events overnight. Remained on BiPAP and tolerated this well. UOP > 100 cc/hr overnight. Failed bedside swallow eval.     Follow-up For: Procedure(s) (LRB):  CORONARY ARTERY BYPASS GRAFT (CABG), left internal mammary artery harvest (N/A)  SURGICAL PROCUREMENT, VEIN, ENDOSCOPIC (Left)  CLOSURE, WOUND, STERNUM (N/A)  APPLICATION, WOUND VAC, Prevena 40 x 5    Post-Operative Day: 1 Day Post-Op    Objective:     Vital Signs (Most Recent):  Temp: 98.7 °F (37.1 °C) (09/10/20 0300)  Pulse: 88 (09/10/20 0500)  Resp: 16 (09/10/20 0524)  BP: 120/64 (09/09/20 1600)  SpO2: 96 % (09/10/20 0500) Vital Signs (24h Range):  Temp:  [97.8 °F (36.6 °C)-98.7 °F (37.1 °C)] 98.7 °F (37.1 °C)  Pulse:  [66-95] 88  Resp:  [11-27] 16  SpO2:  [92 %-98 %] 96 %  BP: (116-120)/(64-69) 120/64  Arterial Line BP: (120-149)/(49-60) 141/56     Weight: 129.5 kg (285 lb 7.9 oz)  Body mass index is 42.16 kg/m².      Intake/Output Summary (Last 24 hours) at 9/10/2020 0533  Last data filed at 9/10/2020 0500  Gross per 24 hour   Intake 3383 ml   Output 3238 ml   Net 145 ml       Physical Exam  Vitals signs and nursing note reviewed.   Constitutional:       Appearance: Normal appearance.   Eyes:      Extraocular Movements: Extraocular movements intact.   Cardiovascular:      Rate and Rhythm: Normal rate and regular rhythm.      Pulses: Normal pulses.   Pulmonary:      Effort: Pulmonary effort is normal. No respiratory distress.      Comments: Chest incision with provena in place. Good seal noted  Chest tubes x3  in place. SS output noted  Abdominal:      General: There is no distension.      Tenderness: There is no abdominal tenderness.      Comments: obese   Skin:     General: Skin is warm and dry.      Capillary Refill: Capillary refill takes less than 2 seconds.   Neurological:      General: No focal deficit present.      Mental Status: He is alert.   Psychiatric:         Mood and Affect: Mood normal.         Vents:  Vent Mode: A/C (09/09/20 1904)  Set Rate: 16 BPM (09/09/20 1904)  Vt Set: 440 mL (09/09/20 1904)  PEEP/CPAP: 5 cmH20 (09/09/20 1904)  Oxygen Concentration (%): 50 (09/10/20 0500)  Peak Airway Pressure: 0 cmH2O (09/09/20 1904)  Plateau Pressure: 0 cmH20 (09/09/20 1904)  Total Ve: 0 mL (09/09/20 1904)    Lines/Drains/Airways     Central Venous Catheter Line                 Percutaneous Central Line Insertion/Assessment - Quad lumen  09/09/20 0901 less than 1 day    Percutaneous Central Line Insertion/Assessment - Quad Lumen  09/09/20 0901 right internal jugular less than 1 day          Drain                 Chest Tube 09/09/20 1311 3 Left Pleural less than 1 day         Urethral Catheter 09/09/20 1012 Non-latex 14 Fr. less than 1 day         Y Chest Tube 1 and 2 09/09/20 1314 1 Mediastinal 2 Mediastinal less than 1 day          Arterial Line            Arterial Line 09/09/20 0816 Left Radial less than 1 day          Line                 Pacer Wires 09/09/20 1300 less than 1 day          Peripheral Intravenous Line                 Peripheral IV - Single Lumen 09/08/20 0045 20 G Anterior;Left;Proximal Forearm 2 days         Peripheral IV - Single Lumen 09/08/20 1100 18 G Right Hand 1 day                Significant Labs:    CBC/Anemia Profile:  Recent Labs   Lab 09/09/20  0314  09/09/20  1438 09/09/20  1440 09/10/20  0305   WBC 13.92*  --   --  22.09* 17.29*   HGB 14.2  --   --  11.8* 11.2*   HCT 45.4   < > 36 37.5* 36.3*     --   --  171 179   MCV 89  --   --  89 91   RDW 13.3  --   --  13.2 13.5    < > =  values in this interval not displayed.        Chemistries:  Recent Labs   Lab 09/09/20  0314 09/09/20  1440 09/10/20  0305    140 137   K 3.6 3.9  3.9 4.9    103 103   CO2 26 28 22*   BUN 10 10 10   CREATININE 0.9 0.9 0.8   CALCIUM 9.4 8.5* 8.9   ALBUMIN  --  3.5 3.8   PROT  --  6.2 6.4   BILITOT  --  0.6 0.7   ALKPHOS  --  87 71   ALT  --  31 25   AST  --  40 40   MG 1.8 2.5 2.1   PHOS 4.4 3.4 4.0       ABGs:   Recent Labs   Lab 09/10/20  0131   PH 7.373   PCO2 43.4   HCO3 25.2   POCSATURATED 94*   BE 0     Coagulation:   Recent Labs   Lab 09/09/20  1441 09/10/20  0305   INR 1.1  --    APTT 30.2 32.9*     Lactic Acid:   Recent Labs   Lab 09/09/20  1600   LACTATE 1.2     All pertinent labs within the past 24 hours have been reviewed.    Significant Imaging:  I have reviewed and interpreted all pertinent imaging results/findings within the past 24 hours.   09/10/2020 CXR - pending      Assessment/Plan:     * NSTEMI (non-ST elevated myocardial infarction)  Bc Garcia is a 70 y.o. male with a hx of DM, HTN, HLD who presented to an OSH with NSTEMI. Was evaluated here for CABG and underwent 2-vessel bypass on 9/9/2020.      Neuro/Psych:   -- Sedation: not indicated  -- Pain: Continue with PRN IV pain meds. Will transition to PO once clear to swallow             Cards:   -- s/p CABG x2 9/9  -- Remains HDS off pressors; wean from cardene gtt as able  -- MAP goal 60-80, SBP <140  -- Pacer wires in place, will set back up rate      Pulm:   -- Goal O2 sat > 88%  -- NIPPV with BiPAP; will try to wean to nasal cannula this am  -- OOB/IS/Ambulate      Renal:  -- Mason in place, strict I/O  -- Normal renal function pre-op  -- Has had excellent UOP      FEN / GI:   -- Net pos 295 yesterday  -- Replace lytes as needed  -- Nutrition: strict NPO  -- Speech eval      ID:   -- Afebrile and WBC WNL  -- Love-op abx      Heme/Onc:   -- H/H stable   -- Daily CBC      Endo:   -- Gluc goal 140-180  -- insulin  gtt      PPx:   Feeding: strict NPO  Analgesia/Sedation: PRN IV pain meds/ not indicated  Thromboembolic prevention: SCDs  HOB >30: yes  Stress Ulcer ppx: famotidine  Glucose control: Critical care goal 140-180 g/dl, ISS     Lines/Drains/Airway: BiPAP, PIV, art line, RIJ CVC x2, provena vac, landers, CT x3 (2 meds, 1 pleural), pacer wires      Dispo/Code Status/Palliative:   -- SICU / Full code         Critical secondary to Patient has a condition that poses threat to life and bodily function: s/p CABGx2     Critical care was time spent personally by me on the following activities: development of treatment plan with patient or surrogate and bedside caregivers, discussions with consultants, evaluation of patient's response to treatment, examination of patient, ordering and performing treatments and interventions, ordering and review of laboratory studies, ordering and review of radiographic studies, pulse oximetry, re-evaluation of patient's condition.  This critical care time did not overlap with that of any other provider or involve time for any procedures.     Jammie Molina MD  Critical Care - Surgery  [facility]

## 2020-09-10 NOTE — CONSULTS
[facility]  Endocrinology  Diabetes Consult Note    Consult Requested by: Ej Vazquez MD   Reason for admit: NSTEMI (non-ST elevated myocardial infarction)    HISTORY OF PRESENT ILLNESS:  Reason for Consult: Management of T2DM, Hyperglycemia     Surgical Procedure and Date: CABG x 2 9/9/20    Lab Results   Component Value Date    HGBA1C 9.4 (H) 09/06/2020     Diabetes diagnosis year: 10 years ago    Home Diabetes Medications: oral medication twice daily (patient does not recall name)     How often checking glucose at home? once every other day   BG readings on regimen: 130-180s  Hypoglycemia on the regimen?  No  Missed doses on regimen?  Yes    Diabetes Complications include:     Hyperglycemia    Complicating diabetes co morbidities:   History of MI      HPI:   Patient is a 70 y.o. male with a diagnosis of HLD, tobacco abuse, obesity, HTN, and T2DM who presented to OSH with NSTEMI. He was transferred to [facility] [place] for CABG evaluation. He is now s/p CABG and admitted to SICU. Endocrinology consulted for management of T2DM.            Interval HPI:   Overnight events: Remains in SICU. POD # 1 s/p CABG. BG well controlled on IV intensive insulin protocol.  Eating:  Clear liquids (<25%)    Nausea: No  Hypoglycemia and intervention: No  Fever: No  TPN and/or TF: No  If yes, type of TF/TPN and rate: none    PMH, PSH, FH, SH reviewed     ROS:  Constitutional: Negative for weight changes.  Eyes: Negative for visual disturbance.  Respiratory: Negative for cough.   Cardiovascular: Positive for chest pain.  Gastrointestinal: Negative for nausea.  Endocrine: Negative for polyuria, polydipsia.  Musculoskeletal: Negative for back pain.  Skin: Negative for rash.  Neurological: Negative for syncope.  Psychiatric/Behavioral: Negative for depression.    Review of Systems    Current Medications and/or Treatments Impacting Glycemic Control  Immunotherapy:    Immunosuppressants     None        Steroids:   Hormones (From  admission, onward)    None        Pressors:    Autonomic Drugs (From admission, onward)    Start     Stop Route Frequency Ordered    09/10/20 2100  metoprolol tartrate (LOPRESSOR) split tablet 12.5 mg      -- Oral 2 times daily 09/10/20 0846        Hyperglycemia/Diabetes Medications:   Antihyperglycemics (From admission, onward)    Start     Stop Route Frequency Ordered    09/09/20 1545  insulin regular 100 Units in sodium chloride 0.9% 100 mL infusion     Question:  Insulin Rate Adjustment (DO NOT MODIFY ANSWER)  Answer:  [email or web address]    -- IV Continuous 09/09/20 1439             PHYSICAL EXAMINATION:  Vitals:    09/10/20 0827   BP:    Pulse:    Resp: 20   Temp:      Body mass index is 42.16 kg/m².    Physical Exam   Constitutional: Well developed, well nourished, obese, NAD.  ENT: External ears no masses with nose patent  Neck: Supple; trachea midline  Cardiovascular: Normal heart sounds, no LE edema. DP +2 bilaterally.  Lungs: Normal effort; lungs anterior bilaterally clear to auscultation.  Abdomen: Soft, no masses, no hernias.  Hypoactive BS noted.  MS: No clubbing or cyanosis of nails noted; unable to assess gait.  Skin: No rashes, lesions, or ulcers; no nodules.  Mid-sternal incision with telfa island dressing, CDI.  CT x 2.  Psychiatric: Good judgement and insight; normal mood and affect.  Neurological: Cranial nerves are grossly intact.  Foot: Nails in good condition, no amputations noted.        Labs Reviewed and Include   Recent Labs   Lab 09/10/20  0305   *   CALCIUM 8.9   ALBUMIN 3.8   PROT 6.4      K 4.9   CO2 22*      BUN 10   CREATININE 0.8   ALKPHOS 71   ALT 25   AST 40   BILITOT 0.7     Lab Results   Component Value Date    WBC 17.29 (H) 09/10/2020    HGB 11.2 (L) 09/10/2020    HCT 36.3 (L) 09/10/2020    MCV 91 09/10/2020     09/10/2020     No results for input(s): TSH, FREET4 in the last 168 hours.  Lab  Results   Component Value Date    HGBA1C 9.4 (H) 09/06/2020       Nutritional status:   Body mass index is 42.16 kg/m².  Lab Results   Component Value Date    ALBUMIN 3.8 09/10/2020    ALBUMIN 3.5 09/09/2020    ALBUMIN 2.9 (L) 09/06/2020     No results found for: PREALBUMIN    Estimated Creatinine Clearance: 116.1 mL/min (based on SCr of 0.8 mg/dL).    Accu-Checks  Recent Labs     09/09/20  2312 09/10/20  0002 09/10/20  0101 09/10/20  0207 09/10/20  0319 09/10/20  0356 09/10/20  0512 09/10/20  0545 09/10/20  0723 09/10/20  0813   POCTGLUCOSE 131* 155* 177* 147* 200* 155* 181* 179* 189* 158*        ASSESSMENT and PLAN    * NSTEMI (non-ST elevated myocardial infarction)  Managed per primary team  Optimize BG control        Uncontrolled type 2 diabetes mellitus without complication, without long-term current use of insulin  BG goal 140-180    Continue IV insulin infusion protocol  Requires intensive BG monitoring while on protocol     ** Please call Endocrine for any BG related issues **  ** Please notify Endocrine for any change and/or advance in diet**    ADDENDUM:   Diet advanced to Cardiac  Discontinue IV intensive insulin protocol  Start Transition IV insulin infusion at 1.5 u/hr with stepdown parameters (rate based off of current IV insulin requirements)     Discharge planning: TBD        Obesity (BMI 35.0-39.9 without comorbidity)  Body mass index is 42.16 kg/m².  May increase insulin resistance.         Mixed hyperlipidemia  May increase insulin resistance.         S/P CABG (coronary artery bypass graft)  Managed per primary team  Optimize BG control          Plan discussed with patient at bedside.     Jacquelyn Bauman NP  Endocrinology  [facility]

## 2020-09-10 NOTE — ASSESSMENT & PLAN NOTE
BG goal 140-180    Continue IV insulin infusion protocol  Requires intensive BG monitoring while on protocol     ** Please call Endocrine for any BG related issues **  ** Please notify Endocrine for any change and/or advance in diet**    Discharge planning: LIZABETH

## 2020-09-10 NOTE — SUBJECTIVE & OBJECTIVE
Interval History/Significant Events: No acute events overnight. Remained on BiPAP and tolerated this well. UOP > 100 cc/hr overnight. Failed bedside swallow eval.     Follow-up For: Procedure(s) (LRB):  CORONARY ARTERY BYPASS GRAFT (CABG), left internal mammary artery harvest (N/A)  SURGICAL PROCUREMENT, VEIN, ENDOSCOPIC (Left)  CLOSURE, WOUND, STERNUM (N/A)  APPLICATION, WOUND VAC, Prevena 40 x 5    Post-Operative Day: 1 Day Post-Op    Objective:     Vital Signs (Most Recent):  Temp: 98.7 °F (37.1 °C) (09/10/20 0300)  Pulse: 88 (09/10/20 0500)  Resp: 16 (09/10/20 0524)  BP: 120/64 (09/09/20 1600)  SpO2: 96 % (09/10/20 0500) Vital Signs (24h Range):  Temp:  [97.8 °F (36.6 °C)-98.7 °F (37.1 °C)] 98.7 °F (37.1 °C)  Pulse:  [66-95] 88  Resp:  [11-27] 16  SpO2:  [92 %-98 %] 96 %  BP: (116-120)/(64-69) 120/64  Arterial Line BP: (120-149)/(49-60) 141/56     Weight: 129.5 kg (285 lb 7.9 oz)  Body mass index is 42.16 kg/m².      Intake/Output Summary (Last 24 hours) at 9/10/2020 0533  Last data filed at 9/10/2020 0500  Gross per 24 hour   Intake 3383 ml   Output 3238 ml   Net 145 ml       Physical Exam  Vitals signs and nursing note reviewed.   Constitutional:       Appearance: Normal appearance.   Eyes:      Extraocular Movements: Extraocular movements intact.   Cardiovascular:      Rate and Rhythm: Normal rate and regular rhythm.      Pulses: Normal pulses.   Pulmonary:      Effort: Pulmonary effort is normal. No respiratory distress.      Comments: Chest incision with provena in place. Good seal noted  Chest tubes x3 in place. SS output noted  Abdominal:      General: There is no distension.      Tenderness: There is no abdominal tenderness.      Comments: obese   Skin:     General: Skin is warm and dry.      Capillary Refill: Capillary refill takes less than 2 seconds.   Neurological:      General: No focal deficit present.      Mental Status: He is alert.   Psychiatric:         Mood and Affect: Mood normal.          Vents:  Vent Mode: A/C (09/09/20 1904)  Set Rate: 16 BPM (09/09/20 1904)  Vt Set: 440 mL (09/09/20 1904)  PEEP/CPAP: 5 cmH20 (09/09/20 1904)  Oxygen Concentration (%): 50 (09/10/20 0500)  Peak Airway Pressure: 0 cmH2O (09/09/20 1904)  Plateau Pressure: 0 cmH20 (09/09/20 1904)  Total Ve: 0 mL (09/09/20 1904)    Lines/Drains/Airways     Central Venous Catheter Line                 Percutaneous Central Line Insertion/Assessment - Quad lumen  09/09/20 0901 less than 1 day    Percutaneous Central Line Insertion/Assessment - Quad Lumen  09/09/20 0901 right internal jugular less than 1 day          Drain                 Chest Tube 09/09/20 1311 3 Left Pleural less than 1 day         Urethral Catheter 09/09/20 1012 Non-latex 14 Fr. less than 1 day         Y Chest Tube 1 and 2 09/09/20 1314 1 Mediastinal 2 Mediastinal less than 1 day          Arterial Line            Arterial Line 09/09/20 0816 Left Radial less than 1 day          Line                 Pacer Wires 09/09/20 1300 less than 1 day          Peripheral Intravenous Line                 Peripheral IV - Single Lumen 09/08/20 0045 20 G Anterior;Left;Proximal Forearm 2 days         Peripheral IV - Single Lumen 09/08/20 1100 18 G Right Hand 1 day                Significant Labs:    CBC/Anemia Profile:  Recent Labs   Lab 09/09/20 0314 09/09/20  1438 09/09/20  1440 09/10/20  0305   WBC 13.92*  --   --  22.09* 17.29*   HGB 14.2  --   --  11.8* 11.2*   HCT 45.4   < > 36 37.5* 36.3*     --   --  171 179   MCV 89  --   --  89 91   RDW 13.3  --   --  13.2 13.5    < > = values in this interval not displayed.        Chemistries:  Recent Labs   Lab 09/09/20  0314 09/09/20  1440 09/10/20  0305    140 137   K 3.6 3.9  3.9 4.9    103 103   CO2 26 28 22*   BUN 10 10 10   CREATININE 0.9 0.9 0.8   CALCIUM 9.4 8.5* 8.9   ALBUMIN  --  3.5 3.8   PROT  --  6.2 6.4   BILITOT  --  0.6 0.7   ALKPHOS  --  87 71   ALT  --  31 25   AST  --  40 40   MG 1.8 2.5 2.1    PHOS 4.4 3.4 4.0       ABGs:   Recent Labs   Lab 09/10/20  0131   PH 7.373   PCO2 43.4   HCO3 25.2   POCSATURATED 94*   BE 0     Coagulation:   Recent Labs   Lab 09/09/20  1441 09/10/20  0305   INR 1.1  --    APTT 30.2 32.9*     Lactic Acid:   Recent Labs   Lab 09/09/20  1600   LACTATE 1.2     All pertinent labs within the past 24 hours have been reviewed.    Significant Imaging:  I have reviewed and interpreted all pertinent imaging results/findings within the past 24 hours.   09/10/2020 CXR - pending

## 2020-09-10 NOTE — PLAN OF CARE
SW is following this Pt for DC planning needs. There are no identified needs at this time. Discharge Disposition: TBD - pending patient progress    SW will continue to coordinate with patient, family, team and insurance to complete patient's discharge plan.    Kaitlyn Workman LMSW   - Case Management

## 2020-09-10 NOTE — ANESTHESIA POSTPROCEDURE EVALUATION
Anesthesia Post Evaluation    Patient: Bc Garcia    Procedure(s) Performed: Procedure(s) (LRB):  CORONARY ARTERY BYPASS GRAFT (CABG), left internal mammary artery harvest (N/A)  SURGICAL PROCUREMENT, VEIN, ENDOSCOPIC (Left)  CLOSURE, WOUND, STERNUM (N/A)  APPLICATION, WOUND VAC, Prevena 40 x 5    Final Anesthesia Type: general    Patient location during evaluation: ICU  Patient participation: Yes- Able to Participate  Level of consciousness: awake and alert and oriented  Post-procedure vital signs: reviewed and stable  Pain management: adequate  Airway patency: patent    PONV status at discharge: No PONV  Anesthetic complications: no      Cardiovascular status: hemodynamically stable and hypertensive (cardene started today for BP control)  Respiratory status: unassisted and spontaneous ventilation  Hydration status: euvolemic  Follow-up not needed.          Vitals Value Taken Time   /62 09/10/20 1232   Temp 36.8 °C (98.3 °F) 09/10/20 1130   Pulse 90 09/10/20 1241   Resp 28 09/10/20 1241   SpO2 96 % 09/10/20 1241   Vitals shown include unvalidated device data.      No case tracking events are documented in the log.      Pain/Hattie Score: Pain Rating Prior to Med Admin: 7 (9/10/2020  8:29 AM)  Pain Rating Post Med Admin: 5 (9/10/2020  9:27 AM)  Hattie Score: 9 (9/9/2020  3:00 PM)

## 2020-09-10 NOTE — NURSING
Notified Dr. Molina that patient's NIBP reading much higher MAPs than art line. Waveform on art line with square wave test is overdampened. Still requiring nicardipine. MD would like to use NIBP for BP readings.   Orders received.

## 2020-09-10 NOTE — PROGRESS NOTES
CT surgery progress note    POD1 from 2v CABG (LIMA-LAD, SVG, OM2)    Patient sitting up in chair  Incisional pain controlled    I/O  3383/3348 net: +35  uop 3020  Chest tubes  L pleural 48  Meds 280    Vitals reviewed  NAD   Unlabored on NC  Chest tubes intact to suction  Prevena CDI, w/ no leak   Central line sites CDI  Mason in    A/P  Looks well    Advance diet  Continue chest tubes  Cont ASA, MTP, Statin  Diuresis this afternoon  Increase activity  Step down later today

## 2020-09-10 NOTE — NURSING
Notified Dr. Molina that patient still requiring nicardipine despite PO amlodipine dosing. Also, O2 sats marginal, low 90s on 6L NC. Patient has been performing IS reaching ~750-1000 ml. Will continue to instruct on IS.

## 2020-09-10 NOTE — ASSESSMENT & PLAN NOTE
Bc Garcia is a 70 y.o. male with a hx of DM, HTN, HLD who presented to an OSH with NSTEMI. Was evaluated here for CABG and underwent 2-vessel bypass on 9/9/2020.      Neuro/Psych:   -- Sedation: not indicated  -- Pain: Continue with PRN IV pain meds. Will transition to PO once clear to swallow             Cards:   -- s/p CABG x2 9/9  -- Remains HDS off pressors; wean from cardene gtt as able  -- MAP goal 60-80, SBP <140  -- Pacer wires in place, will set back up rate      Pulm:   -- Goal O2 sat > 88%  -- NIPPV with BiPAP; will try to wean to nasal cannula this am  -- OOB/IS/Ambulate      Renal:  -- Landers in place, strict I/O  -- Normal renal function pre-op  -- Has had excellent UOP      FEN / GI:   -- Net pos 295 yesterday  -- Replace lytes as needed  -- Nutrition: strict NPO  -- Speech eval      ID:   -- Afebrile and WBC WNL  -- Love-op abx      Heme/Onc:   -- H/H stable   -- Daily CBC      Endo:   -- Gluc goal 140-180  -- insulin gtt      PPx:   Feeding: strict NPO  Analgesia/Sedation: PRN IV pain meds/ not indicated  Thromboembolic prevention: SCDs  HOB >30: yes  Stress Ulcer ppx: famotidine  Glucose control: Critical care goal 140-180 g/dl, ISS     Lines/Drains/Airway: BiPAP, PIV, art line, RIJ CVC x2, provena vac, landers, CT x3 (2 meds, 1 pleural), pacer wires      Dispo/Code Status/Palliative:   -- SICU / Full code

## 2020-09-10 NOTE — SUBJECTIVE & OBJECTIVE
Interval HPI:   Overnight events: Remains in SICU. POD # 1 s/p CABG. BG well controlled on IV intensive insulin protocol.  Eating:  Clear liquids (<25%)    Nausea: No  Hypoglycemia and intervention: No  Fever: No  TPN and/or TF: No  If yes, type of TF/TPN and rate: none    PMH, PSH, FH, SH reviewed     ROS:  Constitutional: Negative for weight changes.  Eyes: Negative for visual disturbance.  Respiratory: Negative for cough.   Cardiovascular: Positive for chest pain.  Gastrointestinal: Negative for nausea.  Endocrine: Negative for polyuria, polydipsia.  Musculoskeletal: Negative for back pain.  Skin: Negative for rash.  Neurological: Negative for syncope.  Psychiatric/Behavioral: Negative for depression.    Review of Systems    Current Medications and/or Treatments Impacting Glycemic Control  Immunotherapy:    Immunosuppressants     None        Steroids:   Hormones (From admission, onward)    None        Pressors:    Autonomic Drugs (From admission, onward)    Start     Stop Route Frequency Ordered    09/10/20 2100  metoprolol tartrate (LOPRESSOR) split tablet 12.5 mg      -- Oral 2 times daily 09/10/20 0846        Hyperglycemia/Diabetes Medications:   Antihyperglycemics (From admission, onward)    Start     Stop Route Frequency Ordered    09/09/20 1545  insulin regular 100 Units in sodium chloride 0.9% 100 mL infusion     Question:  Insulin Rate Adjustment (DO NOT MODIFY ANSWER)  Answer:  [email or web address]    -- IV Continuous 09/09/20 1439             PHYSICAL EXAMINATION:  Vitals:    09/10/20 0827   BP:    Pulse:    Resp: 20   Temp:      Body mass index is 42.16 kg/m².    Physical Exam   Constitutional: Well developed, well nourished, obese, NAD.  ENT: External ears no masses with nose patent  Neck: Supple; trachea midline  Cardiovascular: Normal heart sounds, no LE edema. DP +2 bilaterally.  Lungs: Normal effort; lungs anterior bilaterally clear to  auscultation.  Abdomen: Soft, no masses, no hernias.  Hypoactive BS noted.  MS: No clubbing or cyanosis of nails noted; unable to assess gait.  Skin: No rashes, lesions, or ulcers; no nodules.  Mid-sternal incision with telfa island dressing, CDI.  CT x 2.  Psychiatric: Good judgement and insight; normal mood and affect.  Neurological: Cranial nerves are grossly intact.  Foot: Nails in good condition, no amputations noted.

## 2020-09-10 NOTE — PLAN OF CARE
"      SICU PLAN OF CARE NOTE    Dx: NSTEMI (non-ST elevated myocardial infarction)    Shift Events: VSS throughout shift. No acute events occurred.  Pt OOBTC.     Goals of Care: Will continue to monitor and maintain MAPs 60-80.     Neuro: AAO x4, Arouses to Voice, Follows Commands, and Moves All Extremities    Vital Signs: /64 (BP Location: Right arm, Patient Position: Lying)   Pulse 91   Temp 98.7 °F (37.1 °C) (Axillary)   Resp (!) 21   Ht 5' 9" (1.753 m)   Wt 129.5 kg (285 lb 7.9 oz)   SpO2 (!) 92%   BMI 42.16 kg/m²     Respiratory: Nasal Cannula high flow, 6L     Diet: NPO    Gtts: Insulin    Urine Output: Urinary Catheter 1920 cc/shift    Drains: Pleural chest tube, total output 8 ml / shift   Mediastinal chest tubes, total output 90 ml / shift      Labs/Accuchecks: Labs monitored and replaced as needed. Accuchecks Q1H.     Skin: See flowsheets.  Pt repositioned frequently using pillows.  Heel and sacral foams in place.  Bed plugged in and working properly.  Green waffle pad in place when OOBTC. Tubing and devices kept free of skin.         "

## 2020-09-11 LAB
ALLENS TEST: ABNORMAL
ANION GAP SERPL CALC-SCNC: 7 MMOL/L (ref 8–16)
ANISOCYTOSIS BLD QL SMEAR: SLIGHT
APTT BLDCRRT: 33.8 SEC (ref 21–32)
BASO STIPL BLD QL SMEAR: ABNORMAL
BASOPHILS # BLD AUTO: 0.05 K/UL (ref 0–0.2)
BASOPHILS NFR BLD: 0.3 % (ref 0–1.9)
BUN SERPL-MCNC: 17 MG/DL (ref 8–23)
CALCIUM SERPL-MCNC: 8.7 MG/DL (ref 8.7–10.5)
CHLORIDE SERPL-SCNC: 101 MMOL/L (ref 95–110)
CO2 SERPL-SCNC: 26 MMOL/L (ref 23–29)
CREAT SERPL-MCNC: 1.1 MG/DL (ref 0.5–1.4)
DELSYS: ABNORMAL
DIFFERENTIAL METHOD: ABNORMAL
EOSINOPHIL # BLD AUTO: 0 K/UL (ref 0–0.5)
EOSINOPHIL NFR BLD: 0.1 % (ref 0–8)
EP: 5
ERYTHROCYTE [DISTWIDTH] IN BLOOD BY AUTOMATED COUNT: 13.7 % (ref 11.5–14.5)
EST. GFR  (AFRICAN AMERICAN): >60 ML/MIN/1.73 M^2
EST. GFR  (NON AFRICAN AMERICAN): >60 ML/MIN/1.73 M^2
FIO2: 55
GLUCOSE SERPL-MCNC: 251 MG/DL (ref 70–110)
HCO3 UR-SCNC: 24.3 MMOL/L (ref 24–28)
HCT VFR BLD AUTO: 32.7 % (ref 40–54)
HGB BLD-MCNC: 10.2 G/DL (ref 14–18)
IMM GRANULOCYTES # BLD AUTO: 0.08 K/UL (ref 0–0.04)
IMM GRANULOCYTES NFR BLD AUTO: 0.4 % (ref 0–0.5)
IP: 12
LYMPHOCYTES # BLD AUTO: 1.5 K/UL (ref 1–4.8)
LYMPHOCYTES NFR BLD: 8.1 % (ref 18–48)
MAGNESIUM SERPL-MCNC: 2.1 MG/DL (ref 1.6–2.6)
MCH RBC QN AUTO: 28.4 PG (ref 27–31)
MCHC RBC AUTO-ENTMCNC: 31.2 G/DL (ref 32–36)
MCV RBC AUTO: 91 FL (ref 82–98)
MIN VOL: 9.1
MODE: ABNORMAL
MONOCYTES # BLD AUTO: 2.3 K/UL (ref 0.3–1)
MONOCYTES NFR BLD: 12.4 % (ref 4–15)
NEUTROPHILS # BLD AUTO: 14.3 K/UL (ref 1.8–7.7)
NEUTROPHILS NFR BLD: 78.7 % (ref 38–73)
NRBC BLD-RTO: 0 /100 WBC
PCO2 BLDA: 39.2 MMHG (ref 35–45)
PH SMN: 7.4 [PH] (ref 7.35–7.45)
PHOSPHATE SERPL-MCNC: 3 MG/DL (ref 2.7–4.5)
PLATELET # BLD AUTO: 180 K/UL (ref 150–350)
PLATELET BLD QL SMEAR: ABNORMAL
PMV BLD AUTO: 11.5 FL (ref 9.2–12.9)
PO2 BLDA: 52 MMHG (ref 80–100)
POC BE: 0 MMOL/L
POC SATURATED O2: 86 % (ref 95–100)
POC TCO2: 26 MMOL/L (ref 23–27)
POCT GLUCOSE: 210 MG/DL (ref 70–110)
POCT GLUCOSE: 237 MG/DL (ref 70–110)
POCT GLUCOSE: 254 MG/DL (ref 70–110)
POCT GLUCOSE: 271 MG/DL (ref 70–110)
POLYCHROMASIA BLD QL SMEAR: ABNORMAL
POTASSIUM SERPL-SCNC: 4.6 MMOL/L (ref 3.5–5.1)
RBC # BLD AUTO: 3.59 M/UL (ref 4.6–6.2)
SAMPLE: ABNORMAL
SITE: ABNORMAL
SODIUM SERPL-SCNC: 134 MMOL/L (ref 136–145)
SP02: 89
SPONT RATE: 20
WBC # BLD AUTO: 18.2 K/UL (ref 3.9–12.7)

## 2020-09-11 PROCEDURE — 99233 PR SUBSEQUENT HOSPITAL CARE,LEVL III: ICD-10-PCS | Mod: ,,, | Performed by: ANESTHESIOLOGY

## 2020-09-11 PROCEDURE — 94660 CPAP INITIATION&MGMT: CPT

## 2020-09-11 PROCEDURE — 83735 ASSAY OF MAGNESIUM: CPT

## 2020-09-11 PROCEDURE — 84100 ASSAY OF PHOSPHORUS: CPT

## 2020-09-11 PROCEDURE — 94761 N-INVAS EAR/PLS OXIMETRY MLT: CPT

## 2020-09-11 PROCEDURE — 99900035 HC TECH TIME PER 15 MIN (STAT)

## 2020-09-11 PROCEDURE — 25000003 PHARM REV CODE 250: Performed by: STUDENT IN AN ORGANIZED HEALTH CARE EDUCATION/TRAINING PROGRAM

## 2020-09-11 PROCEDURE — 97535 SELF CARE MNGMENT TRAINING: CPT

## 2020-09-11 PROCEDURE — 85730 THROMBOPLASTIN TIME PARTIAL: CPT

## 2020-09-11 PROCEDURE — 63600175 PHARM REV CODE 636 W HCPCS: Performed by: NURSE PRACTITIONER

## 2020-09-11 PROCEDURE — 63600175 PHARM REV CODE 636 W HCPCS: Performed by: STUDENT IN AN ORGANIZED HEALTH CARE EDUCATION/TRAINING PROGRAM

## 2020-09-11 PROCEDURE — 99232 PR SUBSEQUENT HOSPITAL CARE,LEVL II: ICD-10-PCS | Mod: ,,, | Performed by: NURSE PRACTITIONER

## 2020-09-11 PROCEDURE — 20000000 HC ICU ROOM

## 2020-09-11 PROCEDURE — 85025 COMPLETE CBC W/AUTO DIFF WBC: CPT

## 2020-09-11 PROCEDURE — 80048 BASIC METABOLIC PNL TOTAL CA: CPT

## 2020-09-11 PROCEDURE — 97162 PT EVAL MOD COMPLEX 30 MIN: CPT

## 2020-09-11 PROCEDURE — 99232 SBSQ HOSP IP/OBS MODERATE 35: CPT | Mod: ,,, | Performed by: NURSE PRACTITIONER

## 2020-09-11 PROCEDURE — 27000221 HC OXYGEN, UP TO 24 HOURS

## 2020-09-11 PROCEDURE — 99233 SBSQ HOSP IP/OBS HIGH 50: CPT | Mod: ,,, | Performed by: ANESTHESIOLOGY

## 2020-09-11 PROCEDURE — 97530 THERAPEUTIC ACTIVITIES: CPT

## 2020-09-11 PROCEDURE — 97165 OT EVAL LOW COMPLEX 30 MIN: CPT

## 2020-09-11 RX ORDER — FUROSEMIDE 10 MG/ML
20 INJECTION INTRAMUSCULAR; INTRAVENOUS ONCE
Status: COMPLETED | OUTPATIENT
Start: 2020-09-11 | End: 2020-09-11

## 2020-09-11 RX ORDER — METOPROLOL TARTRATE 25 MG/1
12.5 TABLET ORAL 2 TIMES DAILY
Status: DISCONTINUED | OUTPATIENT
Start: 2020-09-12 | End: 2020-09-14 | Stop reason: HOSPADM

## 2020-09-11 RX ORDER — AMLODIPINE BESYLATE 10 MG/1
10 TABLET ORAL DAILY
Status: DISCONTINUED | OUTPATIENT
Start: 2020-09-11 | End: 2020-09-14 | Stop reason: HOSPADM

## 2020-09-11 RX ORDER — METOPROLOL TARTRATE 25 MG/1
12.5 TABLET ORAL 2 TIMES DAILY
Status: DISCONTINUED | OUTPATIENT
Start: 2020-09-11 | End: 2020-09-11

## 2020-09-11 RX ORDER — INSULIN ASPART 100 [IU]/ML
6-8 INJECTION, SOLUTION INTRAVENOUS; SUBCUTANEOUS
Status: DISCONTINUED | OUTPATIENT
Start: 2020-09-11 | End: 2020-09-13

## 2020-09-11 RX ORDER — POLYETHYLENE GLYCOL 3350 17 G/17G
17 POWDER, FOR SOLUTION ORAL DAILY
Status: DISCONTINUED | OUTPATIENT
Start: 2020-09-11 | End: 2020-09-14 | Stop reason: HOSPADM

## 2020-09-11 RX ORDER — FUROSEMIDE 10 MG/ML
20 INJECTION INTRAMUSCULAR; INTRAVENOUS 2 TIMES DAILY
Status: DISCONTINUED | OUTPATIENT
Start: 2020-09-12 | End: 2020-09-12

## 2020-09-11 RX ORDER — FUROSEMIDE 10 MG/ML
20 INJECTION INTRAMUSCULAR; INTRAVENOUS 2 TIMES DAILY
Status: DISCONTINUED | OUTPATIENT
Start: 2020-09-11 | End: 2020-09-11

## 2020-09-11 RX ORDER — HYDRALAZINE HYDROCHLORIDE 25 MG/1
25 TABLET, FILM COATED ORAL EVERY 8 HOURS PRN
Status: DISCONTINUED | OUTPATIENT
Start: 2020-09-11 | End: 2020-09-14 | Stop reason: HOSPADM

## 2020-09-11 RX ADMIN — OXYCODONE HYDROCHLORIDE 10 MG: 10 TABLET ORAL at 05:09

## 2020-09-11 RX ADMIN — CARVEDILOL 25 MG: 25 TABLET, FILM COATED ORAL at 08:09

## 2020-09-11 RX ADMIN — INSULIN ASPART 6 UNITS: 100 INJECTION, SOLUTION INTRAVENOUS; SUBCUTANEOUS at 09:09

## 2020-09-11 RX ADMIN — ATORVASTATIN CALCIUM 80 MG: 40 TABLET, FILM COATED ORAL at 08:09

## 2020-09-11 RX ADMIN — INSULIN ASPART 2 UNITS: 100 INJECTION, SOLUTION INTRAVENOUS; SUBCUTANEOUS at 09:09

## 2020-09-11 RX ADMIN — INSULIN ASPART 4 UNITS: 100 INJECTION, SOLUTION INTRAVENOUS; SUBCUTANEOUS at 02:09

## 2020-09-11 RX ADMIN — OXYCODONE 5 MG: 5 TABLET ORAL at 02:09

## 2020-09-11 RX ADMIN — INSULIN ASPART 4 UNITS: 100 INJECTION, SOLUTION INTRAVENOUS; SUBCUTANEOUS at 12:09

## 2020-09-11 RX ADMIN — POLYETHYLENE GLYCOL 3350 17 G: 17 POWDER, FOR SOLUTION ORAL at 11:09

## 2020-09-11 RX ADMIN — ASPIRIN 81 MG CHEWABLE TABLET 81 MG: 81 TABLET CHEWABLE at 08:09

## 2020-09-11 RX ADMIN — INSULIN ASPART 4 UNITS: 100 INJECTION, SOLUTION INTRAVENOUS; SUBCUTANEOUS at 06:09

## 2020-09-11 RX ADMIN — HYDRALAZINE HYDROCHLORIDE 25 MG: 25 TABLET, FILM COATED ORAL at 08:09

## 2020-09-11 RX ADMIN — ACETAMINOPHEN 650 MG: 325 TABLET ORAL at 02:09

## 2020-09-11 RX ADMIN — FUROSEMIDE 20 MG: 10 INJECTION, SOLUTION INTRAMUSCULAR; INTRAVENOUS at 03:09

## 2020-09-11 RX ADMIN — INSULIN ASPART 8 UNITS: 100 INJECTION, SOLUTION INTRAVENOUS; SUBCUTANEOUS at 12:09

## 2020-09-11 RX ADMIN — ONDANSETRON 8 MG: 2 INJECTION INTRAMUSCULAR; INTRAVENOUS at 08:09

## 2020-09-11 RX ADMIN — ACETAMINOPHEN 650 MG: 325 TABLET ORAL at 08:09

## 2020-09-11 RX ADMIN — INSULIN ASPART 6 UNITS: 100 INJECTION, SOLUTION INTRAVENOUS; SUBCUTANEOUS at 06:09

## 2020-09-11 RX ADMIN — DOCUSATE SODIUM 50MG AND SENNOSIDES 8.6MG 1 TABLET: 8.6; 5 TABLET, FILM COATED ORAL at 08:09

## 2020-09-11 RX ADMIN — AMLODIPINE BESYLATE 10 MG: 10 TABLET ORAL at 08:09

## 2020-09-11 RX ADMIN — OXYCODONE HYDROCHLORIDE 10 MG: 10 TABLET ORAL at 08:09

## 2020-09-11 RX ADMIN — ACETAMINOPHEN 650 MG: 325 TABLET ORAL at 09:09

## 2020-09-11 RX ADMIN — FUROSEMIDE 20 MG: 10 INJECTION, SOLUTION INTRAMUSCULAR; INTRAVENOUS at 10:09

## 2020-09-11 NOTE — SUBJECTIVE & OBJECTIVE
Interval History/Significant Events: NAEO.  Pt reports that he is feeling a little better today than yesterday.  Positional pain that is well managed with current pain regimen.  Denies any new complaints.    Follow-up For: Procedure(s) (LRB):  CORONARY ARTERY BYPASS GRAFT (CABG), left internal mammary artery harvest (N/A)  SURGICAL PROCUREMENT, VEIN, ENDOSCOPIC (Left)  CLOSURE, WOUND, STERNUM (N/A)  APPLICATION, WOUND VAC, Prevena 40 x 5    Post-Operative Day: 2 Days Post-Op    Objective:     Vital Signs (Most Recent):  Temp: 98.4 °F (36.9 °C) (09/11/20 0700)  Pulse: 67 (09/11/20 0900)  Resp: (!) 23 (09/11/20 0900)  BP: 117/60 (09/11/20 0900)  SpO2: (!) 94 % (09/11/20 0900) Vital Signs (24h Range):  Temp:  [98.2 °F (36.8 °C)-98.8 °F (37.1 °C)] 98.4 °F (36.9 °C)  Pulse:  [] 67  Resp:  [15-33] 23  SpO2:  [87 %-98 %] 94 %  BP: ()/(51-72) 117/60  Arterial Line BP: ()/() 115/45     Weight: 123 kg (271 lb 2.7 oz)  Body mass index is 40.04 kg/m².      Intake/Output Summary (Last 24 hours) at 9/11/2020 0957  Last data filed at 9/11/2020 0900  Gross per 24 hour   Intake 1540.9 ml   Output 1610 ml   Net -69.1 ml       Physical Exam  Vitals signs and nursing note reviewed.   Constitutional:       Appearance: Normal appearance.   Eyes:      Extraocular Movements: Extraocular movements intact.   Cardiovascular:      Rate and Rhythm: Normal rate and regular rhythm.      Pulses: Normal pulses.   Pulmonary:      Effort: Pulmonary effort is normal. No respiratory distress.      Comments: Chest incision with provena in place. Good seal noted  Chest tubes x3 in place. SS output noted  Abdominal:      General: There is no distension.      Tenderness: There is no abdominal tenderness.      Comments: obese   Skin:     General: Skin is warm and dry.      Capillary Refill: Capillary refill takes less than 2 seconds.   Neurological:      General: No focal deficit present.      Mental Status: He is alert.    Psychiatric:         Mood and Affect: Mood normal.         Vents:  Vent Mode: A/C (09/09/20 1904)  Set Rate: 16 BPM (09/09/20 1904)  Vt Set: 440 mL (09/09/20 1904)  PEEP/CPAP: 5 cmH20 (09/09/20 1904)  Oxygen Concentration (%): 65 (09/11/20 0600)  Peak Airway Pressure: 0 cmH2O (09/09/20 1904)  Plateau Pressure: 0 cmH20 (09/09/20 1904)  Total Ve: 0 mL (09/09/20 1904)    Lines/Drains/Airways     Central Venous Catheter Line                 Percutaneous Central Line Insertion/Assessment - Quad lumen  09/09/20 0901 2 days    Percutaneous Central Line Insertion/Assessment - Quad Lumen  09/09/20 0901 right internal jugular 2 days          Drain                 Chest Tube 09/09/20 1311 3 Left Pleural 1 day         Urethral Catheter 09/09/20 1012 Non-latex 14 Fr. 1 day         Y Chest Tube 1 and 2 09/09/20 1314 1 Mediastinal 2 Mediastinal 1 day          Arterial Line            Arterial Line 09/09/20 0816 Left Radial 2 days          Line                 Pacer Wires 09/09/20 1300 1 day          Peripheral Intravenous Line                 Peripheral IV - Single Lumen 09/08/20 0045 20 G Anterior;Left;Proximal Forearm 3 days         Peripheral IV - Single Lumen 09/08/20 1100 18 G Right Hand 2 days                Significant Labs:    CBC/Anemia Profile:  Recent Labs   Lab 09/09/20  1440 09/10/20  0305 09/11/20  0153   WBC 22.09* 17.29* 18.20*   HGB 11.8* 11.2* 10.2*   HCT 37.5* 36.3* 32.7*    179 180   MCV 89 91 91   RDW 13.2 13.5 13.7        Chemistries:  Recent Labs   Lab 09/09/20  1440 09/10/20  0305 09/10/20  1825 09/11/20  0153    137 133* 134*   K 3.9  3.9 4.9 4.6 4.6    103 101 101   CO2 28 22* 26 26   BUN 10 10 14 17   CREATININE 0.9 0.8 1.1 1.1   CALCIUM 8.5* 8.9 8.5* 8.7   ALBUMIN 3.5 3.8  --   --    PROT 6.2 6.4  --   --    BILITOT 0.6 0.7  --   --    ALKPHOS 87 71  --   --    ALT 31 25  --   --    AST 40 40  --   --    MG 2.5 2.1 2.1 2.1   PHOS 3.4 4.0  --  3.0       All pertinent labs within the  past 24 hours have been reviewed.    Significant Imaging:  I have reviewed all pertinent imaging results/findings within the past 24 hours.

## 2020-09-11 NOTE — ASSESSMENT & PLAN NOTE
BG goal 140-180    Increase transition IV insulin infusion to 2.4 u/hr with stepdown parameters (20% dose increase)   Increase Novolog to 6-8 units TID with meals (0.3-0.4 u/kg dosing)   Change to Moderate Dose Correction Scale  BG monitoring ac/hs/0200    ** Please call Endocrine for any BG related issues **  ** Please notify Endocrine for any change and/or advance in diet**    Discharge planning: TBD

## 2020-09-11 NOTE — SUBJECTIVE & OBJECTIVE
"Interval HPI:   Overnight events: Remains in SICU. POD #2. BG slightly above goal ranges on IV/SQ insulin regimen.   Eatin-50%  Nausea: No  Hypoglycemia and intervention: No  Fever: No  TPN and/or TF: No  If yes, type of TF/TPN and rate: none    /61 (BP Location: Right arm, Patient Position: Lying)   Pulse 68   Temp 98.3 °F (36.8 °C) (Axillary)   Resp (!) 24   Ht 5' 9" (1.753 m)   Wt 123 kg (271 lb 2.7 oz)   SpO2 (!) 90%   BMI 40.04 kg/m²     Labs Reviewed and Include    Recent Labs   Lab 20  0153   *   CALCIUM 8.7   *   K 4.6   CO2 26      BUN 17   CREATININE 1.1     Lab Results   Component Value Date    WBC 18.20 (H) 2020    HGB 10.2 (L) 2020    HCT 32.7 (L) 2020    MCV 91 2020     2020     No results for input(s): TSH, FREET4 in the last 168 hours.  Lab Results   Component Value Date    HGBA1C 9.4 (H) 2020       Nutritional status:   Body mass index is 40.04 kg/m².  Lab Results   Component Value Date    ALBUMIN 3.8 09/10/2020    ALBUMIN 3.5 2020    ALBUMIN 2.9 (L) 2020     No results found for: PREALBUMIN    Estimated Creatinine Clearance: 82.1 mL/min (based on SCr of 1.1 mg/dL).    Accu-Checks  Recent Labs     09/10/20  0813 09/10/20  0935 09/10/20  1031 09/10/20  1156 09/10/20  1307 09/10/20  1421 09/10/20  1557 09/10/20  1710 09/10/20  2207 20  0214   POCTGLUCOSE 158* 188* 186* 166* 233* 220* 290* 238* 212* 237*       Current Medications and/or Treatments Impacting Glycemic Control  Immunotherapy:    Immunosuppressants     None        Steroids:   Hormones (From admission, onward)    None        Pressors:    Autonomic Drugs (From admission, onward)    None        Hyperglycemia/Diabetes Medications:   Antihyperglycemics (From admission, onward)    Start     Stop Route Frequency Ordered    09/10/20 1715  insulin aspart U-100 pen 4-6 Units      -- SubQ 3 times daily with meals 09/10/20 1606    09/10/20 1700  " insulin regular 100 Units in sodium chloride 0.9% 100 mL infusion      -- IV Continuous 09/10/20 1546    09/10/20 1646  insulin aspart U-100 pen 0-10 Units      -- SubQ As needed (PRN) 09/10/20 1543

## 2020-09-11 NOTE — PROGRESS NOTES
"[facility]  Endocrinology  Progress Note    Admit Date: 9/3/2020     Reason for Consult: Management of T2DM, Hyperglycemia     Surgical Procedure and Date: CABG x 2 20    Lab Results   Component Value Date    HGBA1C 9.4 (H) 2020     Diabetes diagnosis year: 10 years ago    Home Diabetes Medications: oral medication twice daily (patient does not recall name)     How often checking glucose at home? once every other day   BG readings on regimen: 130-180s  Hypoglycemia on the regimen?  No  Missed doses on regimen?  Yes    Diabetes Complications include:     Hyperglycemia    Complicating diabetes co morbidities:   History of MI      HPI:   Patient is a 70 y.o. male with a diagnosis of HLD, tobacco abuse, obesity, HTN, and T2DM who presented to OSH with NSTEMI. He was transferred to [facility] for CABG evaluation. He is now s/p CABG and admitted to SICU. Endocrinology consulted for management of T2DM.            Interval HPI:   Overnight events: Remains in SICU. POD #2. BG slightly above goal ranges on IV/SQ insulin regimen.   Eatin-50%  Nausea: No  Hypoglycemia and intervention: No  Fever: No  TPN and/or TF: No  If yes, type of TF/TPN and rate: none    /61 (BP Location: Right arm, Patient Position: Lying)   Pulse 68   Temp 98.3 °F (36.8 °C) (Axillary)   Resp (!) 24   Ht 5' 9" (1.753 m)   Wt 123 kg (271 lb 2.7 oz)   SpO2 (!) 90%   BMI 40.04 kg/m²     Labs Reviewed and Include    Recent Labs   Lab 20  0153   *   CALCIUM 8.7   *   K 4.6   CO2 26      BUN 17   CREATININE 1.1     Lab Results   Component Value Date    WBC 18.20 (H) 2020    HGB 10.2 (L) 2020    HCT 32.7 (L) 2020    MCV 91 2020     2020     No results for input(s): TSH, FREET4 in the last 168 hours.  Lab Results   Component Value Date    HGBA1C 9.4 (H) 2020       Nutritional status:   Body mass index is 40.04 kg/m².  Lab Results   Component Value Date "    ALBUMIN 3.8 09/10/2020    ALBUMIN 3.5 09/09/2020    ALBUMIN 2.9 (L) 09/06/2020     No results found for: PREALBUMIN    Estimated Creatinine Clearance: 82.1 mL/min (based on SCr of 1.1 mg/dL).    Accu-Checks  Recent Labs     09/10/20  0813 09/10/20  0935 09/10/20  1031 09/10/20  1156 09/10/20  1307 09/10/20  1421 09/10/20  1557 09/10/20  1710 09/10/20  2207 09/11/20  0214   POCTGLUCOSE 158* 188* 186* 166* 233* 220* 290* 238* 212* 237*       Current Medications and/or Treatments Impacting Glycemic Control  Immunotherapy:    Immunosuppressants     None        Steroids:   Hormones (From admission, onward)    None        Pressors:    Autonomic Drugs (From admission, onward)    None        Hyperglycemia/Diabetes Medications:   Antihyperglycemics (From admission, onward)    Start     Stop Route Frequency Ordered    09/10/20 1715  insulin aspart U-100 pen 4-6 Units      -- SubQ 3 times daily with meals 09/10/20 1606    09/10/20 1700  insulin regular 100 Units in sodium chloride 0.9% 100 mL infusion      -- IV Continuous 09/10/20 1546    09/10/20 1646  insulin aspart U-100 pen 0-10 Units      -- SubQ As needed (PRN) 09/10/20 1546          ASSESSMENT and PLAN    * NSTEMI (non-ST elevated myocardial infarction)  Managed per primary team  Optimize BG control        Uncontrolled type 2 diabetes mellitus without complication, without long-term current use of insulin  BG goal 140-180    Increase transition IV insulin infusion to 2.4 u/hr with stepdown parameters (20% dose increase)   Increase Novolog to 6-8 units TID with meals (0.3-0.4 u/kg dosing)   Change to Moderate Dose Correction Scale  BG monitoring ac/hs/0200    ** Please call Endocrine for any BG related issues **  ** Please notify Endocrine for any change and/or advance in diet**    Discharge planning: TBD        Obesity (BMI 35.0-39.9 without comorbidity)  Body mass index is 40.04 kg/m².  May increase insulin resistance.         Mixed hyperlipidemia  May increase  insulin resistance.         S/P CABG (coronary artery bypass graft)  Managed per primary team  Optimize BG control          Jacquelyn Bauman NP  Endocrinology  [facility]

## 2020-09-11 NOTE — PLAN OF CARE
Discharge Recommendation: HHPT.    Evaluation completed today. PT goals appropriate.    Patient is safe to perform bed <> chair transfer with assist x 1 with nursing staff.    Dominique Hooker, PT, DPT  2020  Pager: [phone]        Problem: Physical Therapy Goal  Goal: Physical Therapy Goal  Description: Goals to be met by: 2020     Patient will increase functional independence with mobility by performin. Sit to stand transfer with Supervision  2. Bed to chair transfer with Supervision using LRAD  3. Gait  x 150 feet with Supervision using LRAD.   4. Ascend/descend 5 stair with no Handrails Stand-by Assistance using no AD.   5. Lower extremity exercise program x20 reps per handout, with independence  6. Recite 3/3 sternal precautions and remain complaint with precautions throughout session with no verbal cues    Outcome: Ongoing, Progressing

## 2020-09-11 NOTE — PLAN OF CARE
SW will work with patient, family and insurance to achieve HH once orders are placed as patient approaches discharge.       09/11/20 6656   Post-Acute Status   Post-Acute Authorization Home Health   Home Health Status Awaiting Orders for HH   Discharge Plan   Discharge Plan A Home Health     Kaitlyn Workman LMSW   - Case Management

## 2020-09-11 NOTE — ASSESSMENT & PLAN NOTE
Bc Garcia is a 70 y.o. male with a hx of DM, HTN, HLD who presented to an OSH with NSTEMI. Was evaluated here for CABG and underwent 2-vessel bypass on 9/9/2020.      Neuro/Psych:   -- Sedation: not indicated  -- Pain: PRN pain meds             Cards:   -- s/p CABG x2 9/9  -- Remains HDS off pressors; wean from cardene gtt as able  -- MAP goal 60-80, SBP <140  -- Amlodipine 10mg PO daily  -- Atorvastatin 80mg PO daily  -- Coreg 25 BID PO daily  -- Pacer wires in place      Pulm:   -- Goal O2 sat > 88%  -- BIPAP last PM; wean to nasal cannula this am  -- OOB/IS/Ambulate      Renal:  -- Landers in place, strict I/O  -- Normal renal function pre-op  -- Has had excellent UOP      FEN / GI:   -- Daily labs  -- Replace lytes as needed  -- Nutrition: diabetic diet, thins; 1500mL fluid restriction      ID:   -- Afebrile; continue to monitor  -- Daily CBC        Heme/Onc:   -- H/H stable   -- Daily CBC      Endo:   -- Gluc goal 140-180  -- insulin gtt      PPx:   Feeding: strict NPO  Analgesia/Sedation: PRN pain meds/ not indicated  Thromboembolic prevention: SCDs  HOB >30: yes  Stress Ulcer ppx: famotidine  Glucose control: Critical care goal 140-180 g/dl, ISS     Lines/Drains/Airway: BiPAP, PIV, art line, RIJ CVC x2, provena vac, landers, CT x3 (2 meds, 1 pleural), pacer wires      Dispo/Code Status/Palliative:   -- SICU / Full code

## 2020-09-11 NOTE — PLAN OF CARE
Patient afebrile, hypertensive today. Attempted to wean nicardipine by adding oral agents today without success. Art line waveform overdampened and reading significantly lower than NIBP, so using NIBP for BP monitoring. CVP 10-12. Insulin decreased to step down sliding scale.     Adequate UOP. Pain controlled.     Plan for possible step down tomorrow if BP controlled.

## 2020-09-11 NOTE — PROGRESS NOTES
[facility]  Critical Care - Surgery  Progress Note    Patient Name: Bc Garcia  MRN: [id number]  Admission Date: 9/3/2020  Hospital Length of Stay: 8 days  Code Status: Full Code  Attending Provider: Ej Vazquez MD  Primary Care Provider: Primary Doctor No   Principal Problem: NSTEMI (non-ST elevated myocardial infarction)    Subjective:     Hospital/ICU Course:  No notes on file    Interval History/Significant Events: NAEO.  Pt reports that he is feeling a little better today than yesterday.  Positional pain that is well managed with current pain regimen.  Denies any new complaints.    Follow-up For: Procedure(s) (LRB):  CORONARY ARTERY BYPASS GRAFT (CABG), left internal mammary artery harvest (N/A)  SURGICAL PROCUREMENT, VEIN, ENDOSCOPIC (Left)  CLOSURE, WOUND, STERNUM (N/A)  APPLICATION, WOUND VAC, Prevena 40 x 5    Post-Operative Day: 2 Days Post-Op    Objective:     Vital Signs (Most Recent):  Temp: 98.4 °F (36.9 °C) (09/11/20 0700)  Pulse: 67 (09/11/20 0900)  Resp: (!) 23 (09/11/20 0900)  BP: 117/60 (09/11/20 0900)  SpO2: (!) 94 % (09/11/20 0900) Vital Signs (24h Range):  Temp:  [98.2 °F (36.8 °C)-98.8 °F (37.1 °C)] 98.4 °F (36.9 °C)  Pulse:  [] 67  Resp:  [15-33] 23  SpO2:  [87 %-98 %] 94 %  BP: ()/(51-72) 117/60  Arterial Line BP: ()/() 115/45     Weight: 123 kg (271 lb 2.7 oz)  Body mass index is 40.04 kg/m².      Intake/Output Summary (Last 24 hours) at 9/11/2020 0957  Last data filed at 9/11/2020 0900  Gross per 24 hour   Intake 1540.9 ml   Output 1610 ml   Net -69.1 ml       Physical Exam  Vitals signs and nursing note reviewed.   Constitutional:       Appearance: Normal appearance.   Eyes:      Extraocular Movements: Extraocular movements intact.   Cardiovascular:      Rate and Rhythm: Normal rate and regular rhythm.      Pulses: Normal pulses.   Pulmonary:      Effort: Pulmonary effort is normal. No respiratory distress.      Comments: Chest incision with  provena in place. Good seal noted  Chest tubes x3 in place. SS output noted  Abdominal:      General: There is no distension.      Tenderness: There is no abdominal tenderness.      Comments: obese   Skin:     General: Skin is warm and dry.      Capillary Refill: Capillary refill takes less than 2 seconds.   Neurological:      General: No focal deficit present.      Mental Status: He is alert.   Psychiatric:         Mood and Affect: Mood normal.         Vents:  Vent Mode: A/C (09/09/20 1904)  Set Rate: 16 BPM (09/09/20 1904)  Vt Set: 440 mL (09/09/20 1904)  PEEP/CPAP: 5 cmH20 (09/09/20 1904)  Oxygen Concentration (%): 65 (09/11/20 0600)  Peak Airway Pressure: 0 cmH2O (09/09/20 1904)  Plateau Pressure: 0 cmH20 (09/09/20 1904)  Total Ve: 0 mL (09/09/20 1904)    Lines/Drains/Airways     Central Venous Catheter Line                 Percutaneous Central Line Insertion/Assessment - Quad lumen  09/09/20 0901 2 days    Percutaneous Central Line Insertion/Assessment - Quad Lumen  09/09/20 0901 right internal jugular 2 days          Drain                 Chest Tube 09/09/20 1311 3 Left Pleural 1 day         Urethral Catheter 09/09/20 1012 Non-latex 14 Fr. 1 day         Y Chest Tube 1 and 2 09/09/20 1314 1 Mediastinal 2 Mediastinal 1 day          Arterial Line            Arterial Line 09/09/20 0816 Left Radial 2 days          Line                 Pacer Wires 09/09/20 1300 1 day          Peripheral Intravenous Line                 Peripheral IV - Single Lumen 09/08/20 0045 20 G Anterior;Left;Proximal Forearm 3 days         Peripheral IV - Single Lumen 09/08/20 1100 18 G Right Hand 2 days                Significant Labs:    CBC/Anemia Profile:  Recent Labs   Lab 09/09/20  1440 09/10/20  0305 09/11/20  0153   WBC 22.09* 17.29* 18.20*   HGB 11.8* 11.2* 10.2*   HCT 37.5* 36.3* 32.7*    179 180   MCV 89 91 91   RDW 13.2 13.5 13.7        Chemistries:  Recent Labs   Lab 09/09/20  1440 09/10/20  0305 09/10/20  1825  09/11/20  0153    137 133* 134*   K 3.9  3.9 4.9 4.6 4.6    103 101 101   CO2 28 22* 26 26   BUN 10 10 14 17   CREATININE 0.9 0.8 1.1 1.1   CALCIUM 8.5* 8.9 8.5* 8.7   ALBUMIN 3.5 3.8  --   --    PROT 6.2 6.4  --   --    BILITOT 0.6 0.7  --   --    ALKPHOS 87 71  --   --    ALT 31 25  --   --    AST 40 40  --   --    MG 2.5 2.1 2.1 2.1   PHOS 3.4 4.0  --  3.0       All pertinent labs within the past 24 hours have been reviewed.    Significant Imaging:  I have reviewed all pertinent imaging results/findings within the past 24 hours.    Assessment/Plan:     * NSTEMI (non-ST elevated myocardial infarction)  Bc Garcia is a 70 y.o. male with a hx of DM, HTN, HLD who presented to an OSH with NSTEMI. Was evaluated here for CABG and underwent 2-vessel bypass on 9/9/2020.      Neuro/Psych:   -- Sedation: not indicated  -- Pain: PRN pain meds             Cards:   -- s/p CABG x2 9/9  -- Remains HDS off pressors; wean from cardene gtt as able  -- MAP goal 60-80, SBP <140  -- Amlodipine 10mg PO daily  -- Atorvastatin 80mg PO daily  -- Coreg 25 BID PO daily  -- Pacer wires in place      Pulm:   -- Goal O2 sat > 88%  -- BIPAP last PM; wean to nasal cannula this am  -- OOB/IS/Ambulate      Renal:  -- Landers in place, strict I/O  -- Normal renal function pre-op  -- Has had excellent UOP      FEN / GI:   -- Daily labs  -- Replace lytes as needed  -- Nutrition: diabetic diet, thins; 1500mL fluid restriction      ID:   -- Afebrile; continue to monitor  -- Daily CBC        Heme/Onc:   -- H/H stable   -- Daily CBC      Endo:   -- Gluc goal 140-180  -- insulin gtt      PPx:   Feeding: strict NPO  Analgesia/Sedation: PRN pain meds/ not indicated  Thromboembolic prevention: SCDs  HOB >30: yes  Stress Ulcer ppx: famotidine  Glucose control: Critical care goal 140-180 g/dl, ISS     Lines/Drains/Airway: BiPAP, PIV, art line, RIJ CVC x2, provena vac, landers, CT x3 (2 meds, 1 pleural), pacer wires      Dispo/Code  Status/Palliative:   -- SICU / Full code             Critical care was time spent personally by me on the following activities: development of treatment plan with patient or surrogate and bedside caregivers, discussions with consultants, evaluation of patient's response to treatment, examination of patient, ordering and performing treatments and interventions, ordering and review of laboratory studies, ordering and review of radiographic studies, pulse oximetry, re-evaluation of patient's condition.  This critical care time did not overlap with that of any other provider or involve time for any procedures.     Brian Orozco MD  Critical Care - Surgery  [facility]

## 2020-09-11 NOTE — PLAN OF CARE
09/11/20 1050   Discharge Reassessment   Assessment Type Discharge Planning Reassessment   Provided patient/caregiver education on the expected discharge date and the discharge plan Yes   Do you have any problems affording any of your prescribed medications? No   Discharge Plan A Home Health   Discharge Plan B Home with family   DME Needed Upon Discharge  other (see comments)  (TBD)       Susanne Hu MPH, RN, CM  Ext. [phone]

## 2020-09-11 NOTE — PT/OT/SLP EVAL
Physical Therapy Evaluation    Patient Name:  Bc Garcia   MRN:  [id number]  Admit Date: 9/3/2020  Admitting Diagnosis:  NSTEMI (non-ST elevated myocardial infarction)   Length of Stay: 8 days  Recent Surgery: Procedure(s) (LRB):  CORONARY ARTERY BYPASS GRAFT (CABG), left internal mammary artery harvest (N/A)  SURGICAL PROCUREMENT, VEIN, ENDOSCOPIC (Left)  CLOSURE, WOUND, STERNUM (N/A)  APPLICATION, WOUND VAC, Prevena 40 x 5 2 Days Post-Op    Recommendations:     Discharge Recommendations:  home health PT   Discharge Equipment Recommendations: none   Barriers to discharge: None    Assessment:     Bc Garcia is a 70 y.o. male admitted with a medical diagnosis of NSTEMI (non-ST elevated myocardial infarction).  He presents with the following impairments/functional limitations: weakness, impaired endurance, impaired self care skills, impaired functional mobilty, gait instability, impaired balance, decreased upper extremity function, decreased lower extremity function, impaired cardiopulmonary response to activity. Pt tolerated initial evaluation well today. Pt demonstrated good functional strength and tolerance to upright activity on this date, requiring minimal to no physical assist for transfers and ambulation. VS remained stable throughout session except for SPO2 which dropped to 85% after steps fwd. Pt did have generalized unsteadiness when standing requiring contact guard assistance throughout ambulation trial for patient safety. Pt with decreased overall endurance, however, and only able to ambulate a total of 5 steps on this date. Pt will continue to benefit from skilled PT services during this hospital admit to continue to improve transfer ability and efficiency as well as continue to progress pt's ambulation distance and cardiopulmonary endurance to maximize pt's functional independence and return to PLOF. After discharge pt will benefit from HHPT to further progress functional mobility and cardiopulmonary  endurance as well as for home safety and energy conservation techniques.    Rehab Prognosis: Good; patient would benefit from acute skilled PT services to address these deficits and reach maximum level of function.      Plan:     During this hospitalization, patient to be seen 5 x/week to address the identified rehab impairments via gait training, therapeutic activities, therapeutic exercises and progress towards the established goals.    · Plan of Care Expires:  10/11/20    Subjective     RN notified prior to session. No family/friends present upon PT entrance into room.    Chief Complaint: Pt with no complaints  Patient/Family Comments/goals: get home  Pain/Comfort:  · Pain Rating 1: 0/10  · Pain Addressed 1: Pre-medicate for activity  · Pain Rating Post-Intervention 1: 0/10    Living Environment:  Patient lives with wife in a single family home with 5 RO.   Prior Level of Function: Patient reports being independent with mobility & with ADLs. Patient uses DME as follows: none. DME owned (not currently used): none.  Roles/Repsonsibilities: Hand Dominance: right Work: retired oil refinery worker. Drive: yes. Managing Medicines/Managing Home: yes. Hobbies: patient has multiple grandchildren and great grandchildren he enjoys spending time with.    Patient reports they will have assistance from family upon discharge.    Objective:     Additional staff present: OT present for initial evaluation    Patient found up in chair with: telemetry, blood pressure cuff, pulse ox (continuous), landers catheter, chest tube, arterial line, central line, peripheral IV, wound vac, oxygen     General Precautions: Standard, Cardiac fall, sternal   Orthopedic Precautions:N/A   Braces: N/A   Body mass index is 40.04 kg/m².  Oxygen Device: High Flow Nasal Cannula 12L    Vitals:    09/11/20 0900   BP: 117/60   Pulse: 67   Resp: (!) 23   Temp:        Exams:  · Mental Status: Patient is AxOx4 and follows all multi-step verbal commands. Pt is  Alert and Cooperative during session.  · Skin Integrity: Intact dressing present midline sternal  · Edema: None noted   · Sensation: Intact  · Hearing: Intact  · Vision:  Intact  · Postural Assessment: slouched posture and rounded shoulders  · Range of Motion:  · RUE: WFL  · LUE: WFL  · RLE: WFL   · LLE: WFL  · Strength Exam:  Lower Extremity Strength: BLE WFL    Outcome Measures:  AM-PAC 6 CLICK MOBILITY  Turning over in bed (including adjusting bedclothes, sheets and blankets)?: 3  Sitting down on and standing up from a chair with arms (e.g., wheelchair, bedside commode, etc.): 3  Moving from lying on back to sitting on the side of the bed?: 3  Moving to and from a bed to a chair (including a wheelchair)?: 3  Need to walk in hospital room?: 3  Climbing 3-5 steps with a railing?: 2  Basic Mobility Total Score: 17     Functional Mobility:    Bed Mobility:   · Pt found/returned to bedside chair    Transfers:   · Sit <> Stand Transfer: contact guard assistance with no assistive device   · Stand <> Sit Transfer: contact guard assistance with no assistive device   · [id number] from bedside chair    Standing Balance:   Assistance Level Required: Contact Guard Assistance   Patient used: no assistive device    Time: ~4 minutes   Postural deviations noted: no deviations noted   Comments: Patient performed standing ADLs with OT while PT created dynamic balance challenges for patient such as reaching for comb and washcloth inside YOSEF. Pt with no buckling or instability. Cueing for upright posture throughout. SPO2 remained at 88% for most of time but dropped to 86% towards end of dynamic balance task.      Gait:   · Patient ambulated: ~5 steps fwd   · Patient required: contact guard  · Patient used:  hand-held assist   · Gait Pattern observed: reciprocal gait  · Gait Deviation(s): decreased step length, wide base of support, shuffle gait, flexed posture and decreased conner  · Impairments due to: pain and decreased  endurance  · all lines remained intact throughout ambulation trial  · Comments: Pt with short step length and decreased foot clearance, requiring CGA and use of HHA for improved balance and foot clearance. No LOB but generally unsteady due to deficits    Education:   Time provided for education, counseling and discussion of health disposition in regards to patient's current status   All questions answered within PT scope of practice and to patient's satisfaction   PT role in POC to address current functional deficits   Pt educated on proper body mechanics, safety techniques, and energy conservation with PT facilitation and cueing throughout session   Call nursing/pct to transfer to chair/use bathroom. Pt stated understanding.   Safe to perform step transfer to/from chair/bedside commode assist x 1 and HHA w/ nursing/PCT present   Importance of OOB tolerance 8-10 hrs/day to improve lung ventilation and expansion as well as strengthen postural musculature   Encouraged patient to perform daily exercises of straight leg raises 10x/hr to increase endurance and decrease effects of bedrest    Sternal Precautions: patient educated on sternal precautions. Patient able to maintain precautions throughout session with minimal verbal cues.    Patient left up in chair with all lines intact, call button in reach and RN notified.    GOALS:   Multidisciplinary Problems     Physical Therapy Goals        Problem: Physical Therapy Goal    Goal Priority Disciplines Outcome Goal Variances Interventions   Physical Therapy Goal     PT, PT/OT Ongoing, Progressing     Description: Goals to be met by: 2020     Patient will increase functional independence with mobility by performin. Sit to stand transfer with Supervision  2. Bed to chair transfer with Supervision using LRAD  3. Gait  x 150 feet with Supervision using LRAD.   4. Ascend/descend 5 stair with no Handrails Stand-by Assistance using no AD.   5. Lower extremity  exercise program x20 reps per handout, with independence  6. Recite 3/3 sternal precautions and remain complaint with precautions throughout session with no verbal cues                     History:     Past Medical History:   Diagnosis Date    Diabetes mellitus     Hyperlipidemia     Hypertension     Morbid obesity due to excess calories        Past Surgical History:   Procedure Laterality Date    APPLICATION OF WOUND VACUUM-ASSISTED CLOSURE DEVICE  9/9/2020    Procedure: APPLICATION, WOUND VAC, Prevena 40 x 5;  Surgeon: Ej Vazquez MD;  Location: [facility] OR [address] [place] [address];  Service: Cardiovascular;;    CORONARY ARTERY BYPASS GRAFT (CABG) N/A 9/9/2020    Procedure: CORONARY ARTERY BYPASS GRAFT (CABG), left internal mammary artery harvest;  Surgeon: Ej Vazquez MD;  Location: [facility] OR [address] [place] [address];  Service: Cardiovascular;  Laterality: N/A;    ENDOSCOPIC HARVEST OF VEIN Left 9/9/2020    Procedure: SURGICAL PROCUREMENT, VEIN, ENDOSCOPIC;  Surgeon: Ej Vazquez MD;  Location: [facility] OR [address] [place] [address];  Service: Cardiovascular;  Laterality: Left;    LEFT HEART CATHETERIZATION Left 9/4/2020    Procedure: Left heart cath, radial, 9 am;  Surgeon: Kiel Rey MD;  Location: [facility] CATH LAB;  Service: Cardiology;  Laterality: Left;    left knee skin excision Left     STERNAL WOUND CLOSURE N/A 9/9/2020    Procedure: CLOSURE, WOUND, STERNUM;  Surgeon: Ej Vazquez MD;  Location: [address];  Service: Cardiovascular;  Laterality: N/A;       Time Tracking:     PT Received On: 09/11/20  PT Start Time: 0917     PT Stop Time: 0937  PT Total Time (min): 20 min     Billable Minutes: Evaluation 12 and Therapeutic Activity 8    Dominique Hooker, PT, DPT  9/11/2020  Pager: [phone]

## 2020-09-11 NOTE — PT/OT/SLP EVAL
Occupational Therapy   Evaluation    Name: Bc Garcia  MRN: [id number]  Admitting Diagnosis:  NSTEMI (non-ST elevated myocardial infarction) 2 Days Post-Op    Recommendations:     Discharge Recommendations: home health OT  Discharge Equipment Recommendations:  none  Barriers to discharge:  None    Assessment:     Bc Garcia is a 70 y.o. male with a medical diagnosis of NSTEMI (non-ST elevated myocardial infarction).  He presents with decreased SpO2 s/p CABG on 9/9. Performance deficits affecting function: weakness, impaired functional mobilty, decreased safety awareness, impaired cardiopulmonary response to activity, gait instability, impaired endurance, impaired balance, impaired self care skills, decreased upper extremity function.      Rehab Prognosis: Good; patient would benefit from acute skilled OT services to address these deficits and reach maximum level of function.       Plan:     Patient to be seen 5 x/week to address the above listed problems via self-care/home management, therapeutic activities, therapeutic exercises  · Plan of Care Expires: 10/11/20  · Plan of Care Reviewed with: patient    Subjective     Chief Complaint: Dizziness  Patient/Family Comments/goals: to get better and go home    Occupational Profile:  Living Environment: lives with spouse in [facility] with 5 RO and no HR  Previous level of function: (I) with ADL and ambulation  Roles and Routines: retired; stays active; drives  Equipment Used at Home:  none  Assistance upon Discharge: spouse can assist    Pain/Comfort:  · Pain Rating 1: 0/10  · Pain Rating Post-Intervention 1: 0/10    Patients cultural, spiritual, [employer] conflicts given the current situation: no    Objective:     Communicated with: RN prior to session.  Patient found up in chair with arterial line, blood pressure cuff, central line, chest tube, landers catheter, oxygen, wound vac, pulse ox (continuous), telemetry upon OT entry to room.    General Precautions: Standard,  fall, sternal   Orthopedic Precautions:    Braces:       Occupational Performance:    Bed Mobility:    · NT    Functional Mobility/Transfers:  · Patient completed Sit <> Stand Transfer with contact guard assistance  with  no assistive device   · Functional Mobility: CGA x 5 steps forward and backwards; limited by decreased SpO2 while on HFNC    Activities of Daily Living:  · Feeding:  set-up  A    · Grooming: contact guard assistance for oral hygiene and combing hair while standing at b/s table  · Upper Body Dressing: maximal assistance    · Lower Body Dressing: maximal assistance      Cognitive/Visual Perceptual:  Oriented to: Person, Place, Time and Situation  Follows Commands/attention: Follows multistep  commands  Communication: clear/fluent  Memory:  No Deficits noted  Safety awareness/insight to disability: intact  Coping skills/emotional control: Appropriate to situation    Physical Exam:  Postural examination/scapula alignment:    -       No postural abnormalities identified  Sensation:    -       Intact  Upper Extremity Range of Motion:     -       Right Upper Extremity: WFL  -       Left Upper Extremity: WFL  Upper Extremity Strength:    -       Right Upper Extremity: WFL  -       Left Upper Extremity: WFL   Strength:    -       Right Upper Extremity: WFL  -       Left Upper Extremity: WFL  Fine Motor Coordination:    -       Intact  Gross motor coordination:   WFL    AMPAC 6 Click ADL:  AMPAC Total Score: 14    Treatment & Education:  Pt ed on OT POC  Pt ed on sternal precautions during ADL; handout provided  Pt ed on ankle pumps and pursed lip breathing 2* dizziness  Pt stood and took 5  Steps to b/s table with SOB and continued dizziness; mobility further deferred  Pt stood with CGA while engaged in self-care with desatting to 86%, thus returned to sitting  SpO2 at end of session 88%; RN aware  Education:    Patient left up in chair with all lines intact, call button in reach and RN  notified    GOALS:   Multidisciplinary Problems     Occupational Therapy Goals        Problem: Occupational Therapy Goal    Goal Priority Disciplines Outcome Interventions   Occupational Therapy Goal     OT, PT/OT Ongoing, Progressing    Description: Goals to be met by: 9/21/20     Patient will increase functional independence with ADLs by performing:    Feeding with [place].  UE Dressing with Supervision.  LE Dressing with Supervision.  Grooming while standing at sink with Supervision.  Toileting from toilet with Supervision for hygiene and clothing management.   Toilet transfer to toilet with Supervision.                     History:     Past Medical History:   Diagnosis Date    Diabetes mellitus     Hyperlipidemia     Hypertension     Morbid obesity due to excess calories        Past Surgical History:   Procedure Laterality Date    APPLICATION OF WOUND VACUUM-ASSISTED CLOSURE DEVICE  9/9/2020    Procedure: APPLICATION, WOUND VAC, Prevena 40 x 5;  Surgeon: Ej Vazquez MD;  Location: [facility] OR [address] [place] [address];  Service: Cardiovascular;;    CORONARY ARTERY BYPASS GRAFT (CABG) N/A 9/9/2020    Procedure: CORONARY ARTERY BYPASS GRAFT (CABG), left internal mammary artery harvest;  Surgeon: Ej Vazquez MD;  Location: [address];  Service: Cardiovascular;  Laterality: N/A;    ENDOSCOPIC HARVEST OF VEIN Left 9/9/2020    Procedure: SURGICAL PROCUREMENT, VEIN, ENDOSCOPIC;  Surgeon: Ej Vazquez MD;  Location: [address];  Service: Cardiovascular;  Laterality: Left;    LEFT HEART CATHETERIZATION Left 9/4/2020    Procedure: Left heart cath, radial, 9 am;  Surgeon: Kiel Rey MD;  Location: [facility] CATH LAB;  Service: Cardiology;  Laterality: Left;    left knee skin excision Left     STERNAL WOUND CLOSURE N/A 9/9/2020    Procedure: CLOSURE, WOUND, STERNUM;  Surgeon: Ej Vazquez MD;  Location: [address];  Service: Cardiovascular;  Laterality: N/A;       Time Tracking:     OT Date of Treatment:  09/11/20  OT Start Time: 0917  OT Stop Time: 0937  OT Total Time (min): 20 min    Billable Minutes:Evaluation 10  Self Care/Home Management 10    YULIET Singh  9/11/2020

## 2020-09-11 NOTE — PLAN OF CARE
Problem: Occupational Therapy Goal  Goal: Occupational Therapy Goal  Description: Goals to be met by: 9/21/20     Patient will increase functional independence with ADLs by performing:    Feeding with [place].  UE Dressing with Supervision.  LE Dressing with Supervision.  Grooming while standing at sink with Supervision.  Toileting from toilet with Supervision for hygiene and clothing management.   Toilet transfer to toilet with Supervision.    Outcome: Ongoing, Progressing   OT eval completed, and above goals established. YULIET Singh  9/11/2020

## 2020-09-11 NOTE — PLAN OF CARE
"   SICU PLAN OF CARE NOTE    Dx: NSTEMI (non-ST elevated myocardial infarction)    Goals of Care: MAP 60-80, ACHS    Vital Signs: BP (!) 103/55   Pulse 72   Temp 98 °F (36.7 °C) (Oral)   Resp (!) 25   Ht 5' 9" (1.753 m)   Wt 123 kg (271 lb 2.7 oz)   SpO2 (!) 89%   BMI 40.04 kg/m²     Exam: well developed, well nourished, no distress, mildly obese    Cardiac: NSR    Resp: Oxygen requirements decreased throughout shift from 12L high flow NC to 10L. Pt tolerated well, BiPAP at night     Neuro: AAO x4, Follows Commands, and Moves All Extremities    Gtts: Insulin @2.4    Urine Output: Urinary Catheter ~750 cc/shift    Drains: Midsternal incision with Prevena in place  Plueral CT: 200 cc/shift, serosang drainage  Mediastinal CTs removed, pt tolerated well. Dressing in place.     Diet: Cardiac Diet, 1500 cc fluid restriction     Labs/Accuchecks: Accuchecks ACHS/2200/0200, daily labs    Skin: all skin CDI, prevana wound vac to mid sternal incision with purple foam in place, mediastinal CT removed with dressing in place, V wires secured to CT dressings, heels and sacral foams in place, all pressure points protected and elevated, pt requires minimal assistance w weight shift and turns w assistance, mattress inflated, bed plugged into wall, no new injuries noted during shift.     Shift Events: all VSS at this time, pt in chair during shift/ up with assistance, mediastinal CT removed, pt tolerated well, lasix 40mg given for low UOP, all questions answered and emotional support provided.        "

## 2020-09-11 NOTE — PLAN OF CARE
"      SICU PLAN OF CARE NOTE    Dx: NSTEMI (non-ST elevated myocardial infarction)    Shift Events: VSS throughout shift. No acute events occurred.  Continued to wean the cardene.     Goals of Care: Will continue to monitor and maintain MAPs 60-80.     Neuro: AAO x4, Arouses to Voice, Follows Commands, and Moves All Extremities    Vital Signs: /61 (BP Location: Right arm, Patient Position: Lying)   Pulse 68   Temp 98.3 °F (36.8 °C) (Axillary)   Resp (!) 24   Ht 5' 9" (1.753 m)   Wt 123 kg (271 lb 2.7 oz)   SpO2 (!) 90%   BMI 40.04 kg/m²     Respiratory: BiPAP/CPAP 65% FIO2    Diet: Cardiac Diet with 1500 ml fluid restriction     Gtts: Insulin and Nicardipine    Urine Output: Urinary Catheter 590 cc/shift    Drains: Chest Tube pleural , total output 10 cc /  shift  Chest tube mediastinal, total output 10 cc / shift      Labs/Accuchecks: Labs monitored and replaced as needed. Accuchecks ACHS, 2200. 0200.     Skin: See flowsheets.  Pt repositioned frequently using pillows.  Heel and sacral foams in place.  Bed plugged in and working properly.  Green waffle pad in place when OOBTC.  Tubing and drains kept free from skin.        "

## 2020-09-12 LAB
ANION GAP SERPL CALC-SCNC: 9 MMOL/L (ref 8–16)
APTT BLDCRRT: 33.8 SEC (ref 21–32)
BASOPHILS # BLD AUTO: 0.07 K/UL (ref 0–0.2)
BASOPHILS NFR BLD: 0.4 % (ref 0–1.9)
BUN SERPL-MCNC: 27 MG/DL (ref 8–23)
CALCIUM SERPL-MCNC: 8.5 MG/DL (ref 8.7–10.5)
CHLORIDE SERPL-SCNC: 98 MMOL/L (ref 95–110)
CO2 SERPL-SCNC: 29 MMOL/L (ref 23–29)
CREAT SERPL-MCNC: 0.9 MG/DL (ref 0.5–1.4)
DIFFERENTIAL METHOD: ABNORMAL
EOSINOPHIL # BLD AUTO: 0.3 K/UL (ref 0–0.5)
EOSINOPHIL NFR BLD: 1.8 % (ref 0–8)
ERYTHROCYTE [DISTWIDTH] IN BLOOD BY AUTOMATED COUNT: 13.8 % (ref 11.5–14.5)
EST. GFR  (AFRICAN AMERICAN): >60 ML/MIN/1.73 M^2
EST. GFR  (NON AFRICAN AMERICAN): >60 ML/MIN/1.73 M^2
GLUCOSE SERPL-MCNC: 167 MG/DL (ref 70–110)
HCT VFR BLD AUTO: 32.6 % (ref 40–54)
HGB BLD-MCNC: 9.9 G/DL (ref 14–18)
IMM GRANULOCYTES # BLD AUTO: 0.13 K/UL (ref 0–0.04)
IMM GRANULOCYTES NFR BLD AUTO: 0.7 % (ref 0–0.5)
INR PPP: 1 (ref 0.8–1.2)
LYMPHOCYTES # BLD AUTO: 2.3 K/UL (ref 1–4.8)
LYMPHOCYTES NFR BLD: 13.4 % (ref 18–48)
MAGNESIUM SERPL-MCNC: 2.2 MG/DL (ref 1.6–2.6)
MCH RBC QN AUTO: 28 PG (ref 27–31)
MCHC RBC AUTO-ENTMCNC: 30.4 G/DL (ref 32–36)
MCV RBC AUTO: 92 FL (ref 82–98)
MONOCYTES # BLD AUTO: 1.9 K/UL (ref 0.3–1)
MONOCYTES NFR BLD: 10.7 % (ref 4–15)
NEUTROPHILS # BLD AUTO: 12.7 K/UL (ref 1.8–7.7)
NEUTROPHILS NFR BLD: 73 % (ref 38–73)
NRBC BLD-RTO: 0 /100 WBC
PHOSPHATE SERPL-MCNC: 3.6 MG/DL (ref 2.7–4.5)
PLATELET # BLD AUTO: 200 K/UL (ref 150–350)
PMV BLD AUTO: 12.5 FL (ref 9.2–12.9)
POCT GLUCOSE: 138 MG/DL (ref 70–110)
POCT GLUCOSE: 154 MG/DL (ref 70–110)
POCT GLUCOSE: 214 MG/DL (ref 70–110)
POCT GLUCOSE: 228 MG/DL (ref 70–110)
POCT GLUCOSE: 243 MG/DL (ref 70–110)
POCT GLUCOSE: 251 MG/DL (ref 70–110)
POTASSIUM SERPL-SCNC: 4.2 MMOL/L (ref 3.5–5.1)
PROTHROMBIN TIME: 11.5 SEC (ref 9–12.5)
RBC # BLD AUTO: 3.54 M/UL (ref 4.6–6.2)
SODIUM SERPL-SCNC: 136 MMOL/L (ref 136–145)
WBC # BLD AUTO: 17.46 K/UL (ref 3.9–12.7)

## 2020-09-12 PROCEDURE — 27000221 HC OXYGEN, UP TO 24 HOURS

## 2020-09-12 PROCEDURE — 85610 PROTHROMBIN TIME: CPT

## 2020-09-12 PROCEDURE — 25000003 PHARM REV CODE 250: Performed by: STUDENT IN AN ORGANIZED HEALTH CARE EDUCATION/TRAINING PROGRAM

## 2020-09-12 PROCEDURE — 83735 ASSAY OF MAGNESIUM: CPT

## 2020-09-12 PROCEDURE — 85730 THROMBOPLASTIN TIME PARTIAL: CPT

## 2020-09-12 PROCEDURE — 25000003 PHARM REV CODE 250: Performed by: NURSE PRACTITIONER

## 2020-09-12 PROCEDURE — 27100171 HC OXYGEN HIGH FLOW UP TO 24 HOURS

## 2020-09-12 PROCEDURE — 85025 COMPLETE CBC W/AUTO DIFF WBC: CPT

## 2020-09-12 PROCEDURE — 99233 SBSQ HOSP IP/OBS HIGH 50: CPT | Mod: ,,, | Performed by: ANESTHESIOLOGY

## 2020-09-12 PROCEDURE — 99232 SBSQ HOSP IP/OBS MODERATE 35: CPT | Mod: ,,, | Performed by: NURSE PRACTITIONER

## 2020-09-12 PROCEDURE — 20600001 HC STEP DOWN PRIVATE ROOM

## 2020-09-12 PROCEDURE — 80048 BASIC METABOLIC PNL TOTAL CA: CPT

## 2020-09-12 PROCEDURE — 99232 PR SUBSEQUENT HOSPITAL CARE,LEVL II: ICD-10-PCS | Mod: ,,, | Performed by: NURSE PRACTITIONER

## 2020-09-12 PROCEDURE — 84100 ASSAY OF PHOSPHORUS: CPT

## 2020-09-12 PROCEDURE — 99233 PR SUBSEQUENT HOSPITAL CARE,LEVL III: ICD-10-PCS | Mod: ,,, | Performed by: ANESTHESIOLOGY

## 2020-09-12 PROCEDURE — 63600175 PHARM REV CODE 636 W HCPCS: Performed by: STUDENT IN AN ORGANIZED HEALTH CARE EDUCATION/TRAINING PROGRAM

## 2020-09-12 PROCEDURE — 94660 CPAP INITIATION&MGMT: CPT

## 2020-09-12 PROCEDURE — 99900035 HC TECH TIME PER 15 MIN (STAT)

## 2020-09-12 PROCEDURE — 94761 N-INVAS EAR/PLS OXIMETRY MLT: CPT

## 2020-09-12 RX ORDER — ASPIRIN 325 MG
325 TABLET ORAL DAILY
Status: DISCONTINUED | OUTPATIENT
Start: 2020-09-12 | End: 2020-09-14 | Stop reason: HOSPADM

## 2020-09-12 RX ORDER — FUROSEMIDE 10 MG/ML
40 INJECTION INTRAMUSCULAR; INTRAVENOUS 2 TIMES DAILY
Status: DISCONTINUED | OUTPATIENT
Start: 2020-09-12 | End: 2020-09-14 | Stop reason: HOSPADM

## 2020-09-12 RX ORDER — TAMSULOSIN HYDROCHLORIDE 0.4 MG/1
0.4 CAPSULE ORAL DAILY
Status: DISCONTINUED | OUTPATIENT
Start: 2020-09-12 | End: 2020-09-14 | Stop reason: HOSPADM

## 2020-09-12 RX ADMIN — FUROSEMIDE 40 MG: 10 INJECTION, SOLUTION INTRAMUSCULAR; INTRAVENOUS at 09:09

## 2020-09-12 RX ADMIN — INSULIN ASPART 4 UNITS: 100 INJECTION, SOLUTION INTRAVENOUS; SUBCUTANEOUS at 06:09

## 2020-09-12 RX ADMIN — INSULIN ASPART 8 UNITS: 100 INJECTION, SOLUTION INTRAVENOUS; SUBCUTANEOUS at 01:09

## 2020-09-12 RX ADMIN — METOPROLOL TARTRATE 12.5 MG: 25 TABLET, FILM COATED ORAL at 08:09

## 2020-09-12 RX ADMIN — ACETAMINOPHEN 650 MG: 325 TABLET ORAL at 04:09

## 2020-09-12 RX ADMIN — ACETAMINOPHEN 650 MG: 325 TABLET ORAL at 08:09

## 2020-09-12 RX ADMIN — INSULIN ASPART 2 UNITS: 100 INJECTION, SOLUTION INTRAVENOUS; SUBCUTANEOUS at 10:09

## 2020-09-12 RX ADMIN — METOPROLOL TARTRATE 12.5 MG: 25 TABLET, FILM COATED ORAL at 09:09

## 2020-09-12 RX ADMIN — ATORVASTATIN CALCIUM 80 MG: 40 TABLET, FILM COATED ORAL at 09:09

## 2020-09-12 RX ADMIN — ACETAMINOPHEN 650 MG: 325 TABLET ORAL at 09:09

## 2020-09-12 RX ADMIN — OXYCODONE 5 MG: 5 TABLET ORAL at 02:09

## 2020-09-12 RX ADMIN — FUROSEMIDE 20 MG: 10 INJECTION, SOLUTION INTRAMUSCULAR; INTRAVENOUS at 08:09

## 2020-09-12 RX ADMIN — INSULIN ASPART 6 UNITS: 100 INJECTION, SOLUTION INTRAVENOUS; SUBCUTANEOUS at 09:09

## 2020-09-12 RX ADMIN — AMLODIPINE BESYLATE 10 MG: 10 TABLET ORAL at 08:09

## 2020-09-12 RX ADMIN — POLYETHYLENE GLYCOL 3350 17 G: 17 POWDER, FOR SOLUTION ORAL at 08:09

## 2020-09-12 RX ADMIN — INSULIN ASPART 2 UNITS: 100 INJECTION, SOLUTION INTRAVENOUS; SUBCUTANEOUS at 01:09

## 2020-09-12 RX ADMIN — OXYCODONE 5 MG: 5 TABLET ORAL at 12:09

## 2020-09-12 RX ADMIN — ASPIRIN 325 MG ORAL TABLET 325 MG: 325 PILL ORAL at 08:09

## 2020-09-12 RX ADMIN — TAMSULOSIN HYDROCHLORIDE 0.4 MG: 0.4 CAPSULE ORAL at 08:09

## 2020-09-12 RX ADMIN — OXYCODONE HYDROCHLORIDE 10 MG: 10 TABLET ORAL at 09:09

## 2020-09-12 RX ADMIN — INSULIN ASPART 8 UNITS: 100 INJECTION, SOLUTION INTRAVENOUS; SUBCUTANEOUS at 06:09

## 2020-09-12 NOTE — PROGRESS NOTES
CT surgery progress note    A/P  70M admitted w/ NSTEMI from OSH now s/p 2v CABG (POD3)    Cardiac diet  Cont asa, statin, MTP  Continue diuresis  bipap qhs  Increase activity  pulm hygiene   Continue chest tubes   Bowel care  Okay to step down

## 2020-09-12 NOTE — NURSING
Received report from [facility] RN. Patient transport via bed to CSU.  Patient notified Jorge Luis, son of transfer to CSU.  Last BM 9/9/20.  Miralax given at 0829 hours.  Paged [facility] to notify of patient's arrival to floor.

## 2020-09-12 NOTE — SUBJECTIVE & OBJECTIVE
"Interval HPI:   Overnight events: Remains in SICU. POD #3. BG trending down on current IV/SQ insulin regimen.   Eatin%  Nausea: No  Hypoglycemia and intervention: No  Fever: No  TPN and/or TF: No  If yes, type of TF/TPN and rate: none    /60   Pulse 72   Temp 98.3 °F (36.8 °C) (Oral)   Resp 19   Ht 5' 9" (1.753 m)   Wt 123 kg (271 lb 2.7 oz)   SpO2 95%   BMI 40.04 kg/m²     Labs Reviewed and Include    Recent Labs   Lab 20  0509   *   CALCIUM 8.5*      K 4.2   CO2 29   CL 98   BUN 27*   CREATININE 0.9     Lab Results   Component Value Date    WBC 17.46 (H) 2020    HGB 9.9 (L) 2020    HCT 32.6 (L) 2020    MCV 92 2020     2020     No results for input(s): TSH, FREET4 in the last 168 hours.  Lab Results   Component Value Date    HGBA1C 9.4 (H) 2020       Nutritional status:   Body mass index is 40.04 kg/m².  Lab Results   Component Value Date    ALBUMIN 3.8 09/10/2020    ALBUMIN 3.5 2020    ALBUMIN 2.9 (L) 2020     No results found for: PREALBUMIN    Estimated Creatinine Clearance: 100.4 mL/min (based on SCr of 0.9 mg/dL).    Accu-Checks  Recent Labs     09/10/20  1421 09/10/20  1557 09/10/20  1710 09/10/20  2207 20  0214 20  0910 20  1128 20  1727 20  0407 20  0827   POCTGLUCOSE 220* 290* 238* 212* 237* 210* 271* 254* 154* 138*       Current Medications and/or Treatments Impacting Glycemic Control  Immunotherapy:    Immunosuppressants     None        Steroids:   Hormones (From admission, onward)    None        Pressors:    Autonomic Drugs (From admission, onward)    Start     Stop Route Frequency Ordered    20 0900  metoprolol tartrate (LOPRESSOR) split tablet 12.5 mg      -- Oral 2 times daily 20 1047        Hyperglycemia/Diabetes Medications:   Antihyperglycemics (From admission, onward)    Start     Stop Route Frequency Ordered    20 0815  insulin aspart U-100 pen 6-8 " Units      -- SubQ 3 times daily with meals 09/11/20 0802    09/10/20 1700  insulin regular 100 Units in sodium chloride 0.9% 100 mL infusion      -- IV Continuous 09/10/20 1546    09/10/20 1646  insulin aspart U-100 pen 0-10 Units      -- SubQ As needed (PRN) 09/10/20 1544

## 2020-09-12 NOTE — PROGRESS NOTES
"[facility]  Endocrinology  Progress Note    Admit Date: 9/3/2020     Reason for Consult: Management of T2DM, Hyperglycemia     Surgical Procedure and Date: CABG x 2 20    Lab Results   Component Value Date    HGBA1C 9.4 (H) 2020     Diabetes diagnosis year: 10 years ago    Home Diabetes Medications: oral medication twice daily (patient does not recall name)     How often checking glucose at home? once every other day   BG readings on regimen: 130-180s  Hypoglycemia on the regimen?  No  Missed doses on regimen?  Yes    Diabetes Complications include:     Hyperglycemia    Complicating diabetes co morbidities:   History of MI      HPI:   Patient is a 70 y.o. male with a diagnosis of HLD, tobacco abuse, obesity, HTN, and T2DM who presented to OSH with NSTEMI. He was [place] [facility] [place]. He is now s/p CABG and admitted to SICU. Endocrinology consulted for management of T2DM.            Interval HPI:   Overnight events: Remains in SICU. POD #3. BG trending down on current IV/SQ insulin regimen.   Eatin%  Nausea: No  Hypoglycemia and intervention: No  Fever: No  TPN and/or TF: No  If yes, type of TF/TPN and rate: none    /60   Pulse 72   Temp 98.3 °F (36.8 °C) (Oral)   Resp 19   Ht 5' 9" (1.753 m)   Wt 123 kg (271 lb 2.7 oz)   SpO2 95%   BMI 40.04 kg/m²     Labs Reviewed and Include    Recent Labs   Lab 20  0509   *   CALCIUM 8.5*      K 4.2   CO2 29   CL 98   BUN 27*   CREATININE 0.9     Lab Results   Component Value Date    WBC 17.46 (H) 2020    HGB 9.9 (L) 2020    HCT 32.6 (L) 2020    MCV 92 2020     2020     No results for input(s): TSH, FREET4 in the last 168 hours.  Lab Results   Component Value Date    HGBA1C 9.4 (H) 2020       Nutritional status:   Body mass index is 40.04 kg/m².  Lab Results   Component Value Date    ALBUMIN 3.8 09/10/2020    ALBUMIN 3.5 2020    ALBUMIN 2.9 (L) " 09/06/2020     No results found for: PREALBUMIN    Estimated Creatinine Clearance: 100.4 mL/min (based on SCr of 0.9 mg/dL).    Accu-Checks  Recent Labs     09/10/20  1421 09/10/20  1557 09/10/20  1710 09/10/20  2207 09/11/20  0214 09/11/20  0910 09/11/20  1128 09/11/20  1727 09/12/20  0407 09/12/20  0827   POCTGLUCOSE 220* 290* 238* 212* 237* 210* 271* 254* 154* 138*       Current Medications and/or Treatments Impacting Glycemic Control  Immunotherapy:    Immunosuppressants     None        Steroids:   Hormones (From admission, onward)    None        Pressors:    Autonomic Drugs (From admission, onward)    Start     Stop Route Frequency Ordered    09/12/20 0900  metoprolol tartrate (LOPRESSOR) split tablet 12.5 mg      -- Oral 2 times daily 09/11/20 1047        Hyperglycemia/Diabetes Medications:   Antihyperglycemics (From admission, onward)    Start     Stop Route Frequency Ordered    09/11/20 0815  insulin aspart U-100 pen 6-8 Units      -- SubQ 3 times daily with meals 09/11/20 0802    09/10/20 1700  insulin regular 100 Units in sodium chloride 0.9% 100 mL infusion      -- IV Continuous 09/10/20 1546    09/10/20 1646  insulin aspart U-100 pen 0-10 Units      -- SubQ As needed (PRN) 09/10/20 1546          ASSESSMENT and PLAN    * NSTEMI (non-ST elevated myocardial infarction)  Managed per primary team  Optimize BG control        Uncontrolled type 2 diabetes mellitus without complication, without long-term current use of insulin  BG goal 140-180  BG slightly below goal ranges    Decrease transition IV insulin infusion to 2 u/hr with stepdown parameters (20% dose decrease)   Continue Novolog 6-8 units TID with meals (0.3-0.4 u/kg dosing)   Moderate Dose Correction Scale  BG monitoring ac/hs/0200    ** Please call Endocrine for any BG related issues **  ** Please notify Endocrine for any change and/or advance in diet**    Discharge planning: TBD        Obesity (BMI 35.0-39.9 without comorbidity)  Body mass index is  40.04 kg/m².  May increase insulin resistance.         Mixed hyperlipidemia  May increase insulin resistance.         S/P CABG (coronary artery bypass graft)  Managed per primary team  Optimize BG control          Jacquelyn Bauman NP  Endocrinology  [facility]

## 2020-09-12 NOTE — NURSING
Assisted patient to bariatric bedside commode. Patient is very strong, but requires assist x 2 because of multiple modalities (CT, Mason, Oxygen, IV tubing).

## 2020-09-12 NOTE — ASSESSMENT & PLAN NOTE
Bc Garcia is a 70 y.o. male with a hx of DM, HTN, HLD who presented to an OSH with NSTEMI. Was evaluated here for CABG and underwent 2-vessel bypass on 9/9/2020.      Neuro/Psych:   -- Sleeping well at night  -- Pain: PRN PO pain meds             Cards:   -- s/p CABG x2 9/9. 1 pleural tube, 2 meds pulled yesterday  -- Remains HDS off vasoactive meds  -- MAP goal 60-80, SBP <140  -- Amlodipine 10mg PO daily, metoprolol 12.5 BID to start today  -- Atorvastatin 80mg PO daily,  daily        Pulm:   -- Goal O2 sat > 88%  -- BIPAP overnight as needed  -- OOB/IS/Ambulate      Renal:  -- Isabella in place, started on flomax today. Likely d/c tomorrow  -- UOP good with lasix, continue this today  -- Has had excellent UOP      FEN / GI:   -- Daily labs  -- Replace lytes as needed  -- Nutrition: diabetic diet, thins; 1500mL fluid restriction      ID:   -- Afebrile; continue to monitor  -- Daily CBC        Heme/Onc:   -- H/H stable   -- Daily CBC      Endo:   -- Gluc goal 140-180  -- Endo consulted, appreciate recs      PPx:   Feeding: cardiac diet, 1500 cc fluid restriction  Analgesia/Sedation: PRN pain meds/ not indicated  Thromboembolic prevention: SCDs  HOB >30: yes  Stress Ulcer ppx: famotidine  Glucose control: Critical care goal 140-180 g/dl, ISS     Lines/Drains/Airway: PIV, art line, RIJ CVC x2, provena vac, landers, CT x1 pleural, pacer wires      Dispo/Code Status/Palliative:   -- Step down today / Full code

## 2020-09-12 NOTE — PLAN OF CARE
"      SICU PLAN OF CARE NOTE    Dx: NSTEMI (non-ST elevated myocardial infarction)    Goals of Care: MAP 60-80    Vital Signs: /60   Pulse 73   Temp 98.3 °F (36.8 °C) (Oral)   Resp 16   Ht 5' 9" (1.753 m)   Wt 123 kg (271 lb 2.7 oz)   SpO2 (!) 94%   BMI 40.04 kg/m²     Exam: appears acutely ill, well developed, well nourished, no distress, mildly obese    Cardiac: NSR     Resp:  Pt tolerated BiPAP well overnight. Oxygen requirements decreased and weaned as tolerated.    Neuro: AAO x4, Follows Commands and Moves All Extremities    Gtts: insulin @2    Diet: Cardiac Diet, 1500 fluid restriction     Labs/Accuchecks:Accuchecks ACHS/2200/0200, daily labs    Shift Events: all VSS at this time, pt in chair during shift/up with assistance, family at bedside, all questions answered and emotional support provided.     Transfer To: CSU bed 308    Transfer via bed    Transfer with 8L NC to O2 and cardiac monitoring    Transported by ASHVIN Conner RN     Medicines sent: Insulin gtt     Chart send with patient: Yes    Notified: spouse, son    Upon arrival to floor: patient reassessed, cardiac monitor applied, patient oriented to room, call bell in reach, bed in lowest position, nurse notified and to bedside    Skin note: all skin CDI, prevana wound vac to mid sternal incision with purple foam in place, mediastinal CT removed with dressing in place, V wires secured to CT dressings, sacral foam in place, all pressure points protected and elevated, pt requires minimal assistance w weight shift and turns w assistance, mattress inflated, bed plugged into wall, no new injuries noted during shift.         "

## 2020-09-12 NOTE — ASSESSMENT & PLAN NOTE
BG goal 140-180  BG slightly below goal ranges    Decrease transition IV insulin infusion to 2 u/hr with stepdown parameters (20% dose decrease)   Continue Novolog 6-8 units TID with meals (0.3-0.4 u/kg dosing)   Moderate Dose Correction Scale  BG monitoring ac/hs/0200    ** Please call Endocrine for any BG related issues **  ** Please notify Endocrine for any change and/or advance in diet**    Discharge planning: TBD

## 2020-09-12 NOTE — PROGRESS NOTES
[facility]  Critical Care - Surgery  Progress Note    Patient Name: Bc Garcia  MRN: [id number]  Admission Date: 9/3/2020  Hospital Length of Stay: 9 days  Code Status: Full Code  Attending Provider: Ej Vazquez MD  Primary Care Provider: Primary Doctor No   Principal Problem: NSTEMI (non-ST elevated myocardial infarction)    Subjective:     Hospital/ICU Course:  No notes on file    Interval History/Significant Events: No acute events. Mediastinal tubes pulled yesterday and pleural with only 10 cc SS fluid overnight. Continues on BiPAP at night but is using his IS and gets OOB. BP now well controlled with PO meds, has been off cardene for >24 hours.    Follow-up For: Procedure(s) (LRB):  CORONARY ARTERY BYPASS GRAFT (CABG), left internal mammary artery harvest (N/A)  SURGICAL PROCUREMENT, VEIN, ENDOSCOPIC (Left)  CLOSURE, WOUND, STERNUM (N/A)  APPLICATION, WOUND VAC, Prevena 40 x 5    Post-Operative Day: 3 Days Post-Op    Objective:     Vital Signs (Most Recent):  Temp: 98.3 °F (36.8 °C) (09/12/20 0700)  Pulse: 70 (09/12/20 0745)  Resp: 17 (09/12/20 0745)  BP: (!) 106/55 (09/12/20 0700)  SpO2: 95 % (09/12/20 0745) Vital Signs (24h Range):  Temp:  [97.4 °F (36.3 °C)-98.5 °F (36.9 °C)] 98.3 °F (36.8 °C)  Pulse:  [63-75] 70  Resp:  [13-33] 17  SpO2:  [87 %-100 %] 95 %  BP: ()/(50-65) 106/55  Arterial Line BP: ()/() 113/53     Weight: 123 kg (271 lb 2.7 oz)  Body mass index is 40.04 kg/m².      Intake/Output Summary (Last 24 hours) at 9/12/2020 0755  Last data filed at 9/12/2020 0700  Gross per 24 hour   Intake 1250.2 ml   Output 1710 ml   Net -459.8 ml       Physical Exam  Vitals signs and nursing note reviewed.   Constitutional:       Appearance: Normal appearance.   Eyes:      Extraocular Movements: Extraocular movements intact.   Cardiovascular:      Rate and Rhythm: Normal rate and regular rhythm.      Pulses: Normal pulses.   Pulmonary:      Effort: Pulmonary effort is  normal. No respiratory distress.      Comments: Chest incision with provena in place. Good seal noted  Chest tubes x3 in place. SS output noted  Abdominal:      General: There is no distension.      Tenderness: There is no abdominal tenderness.      Comments: obese   Skin:     General: Skin is warm and dry.      Capillary Refill: Capillary refill takes less than 2 seconds.   Neurological:      General: No focal deficit present.      Mental Status: He is alert.   Psychiatric:         Mood and Affect: Mood normal.         Vents:  Vent Mode: A/C (09/09/20 1904)  Set Rate: 16 BPM (09/09/20 1904)  Vt Set: 440 mL (09/09/20 1904)  PEEP/CPAP: 5 cmH20 (09/09/20 1904)  Oxygen Concentration (%): 65 (09/11/20 0600)  Peak Airway Pressure: 0 cmH2O (09/09/20 1904)  Plateau Pressure: 0 cmH20 (09/09/20 1904)  Total Ve: 0 mL (09/09/20 1904)    Lines/Drains/Airways     Central Venous Catheter Line                 Percutaneous Central Line Insertion/Assessment - Quad lumen  09/09/20 0901 2 days    Percutaneous Central Line Insertion/Assessment - Quad Lumen  09/09/20 0901 right internal jugular 2 days          Drain                 Chest Tube 09/09/20 1311 3 Left Pleural 2 days         Urethral Catheter 09/09/20 1012 Non-latex 14 Fr. 2 days          Arterial Line            Arterial Line 09/09/20 0816 Left Radial 2 days          Line                 Pacer Wires 09/09/20 1300 2 days          Peripheral Intravenous Line                 Peripheral IV - Single Lumen 09/08/20 1100 18 G Right Hand 3 days                Significant Labs:    CBC/Anemia Profile:  Recent Labs   Lab 09/11/20  0153 09/12/20  0509   WBC 18.20* 17.46*   HGB 10.2* 9.9*   HCT 32.7* 32.6*    200   MCV 91 92   RDW 13.7 13.8        Chemistries:  Recent Labs   Lab 09/10/20  1825 09/11/20  0153 09/12/20  0509   * 134* 136   K 4.6 4.6 4.2    101 98   CO2 26 26 29   BUN 14 17 27*   CREATININE 1.1 1.1 0.9   CALCIUM 8.5* 8.7 8.5*   MG 2.1 2.1 2.2   PHOS  --   3.0 3.6       ABGs:   Recent Labs   Lab 09/10/20  2340   PH 7.401   PCO2 39.2   HCO3 24.3   POCSATURATED 86*   BE 0     Coagulation:   Recent Labs   Lab 09/12/20  0509   INR 1.0   APTT 33.8*     All pertinent labs within the past 24 hours have been reviewed.    Significant Imaging:  I have reviewed and interpreted all pertinent imaging results/findings within the past 24 hours.     09/12/2020 CXR  Final read for today pending  Persistent opacity in LLL likely atelectasis      Assessment/Plan:     * NSTEMI (non-ST elevated myocardial infarction)  Bc Garcia is a 70 y.o. male with a hx of DM, HTN, HLD who presented to an OSH with NSTEMI. Was evaluated here for CABG and underwent 2-vessel bypass on 9/9/2020.      Neuro/Psych:   -- Sleeping well at night  -- Pain: PRN PO pain meds             Cards:   -- s/p CABG x2 9/9. 1 pleural tube, 2 meds pulled yesterday  -- Remains HDS off vasoactive meds  -- MAP goal 60-80, SBP <140  -- Amlodipine 10mg PO daily, metoprolol 12.5 BID to start today  -- Atorvastatin 80mg PO daily,  daily        Pulm:   -- Goal O2 sat > 88%  -- BIPAP overnight as needed  -- OOB/IS/Ambulate      Renal:  -- Landers in place, started on flomax today. Likely d/c tomorrow  -- UOP good with lasix, continue this today  -- Has had excellent UOP      FEN / GI:   -- Daily labs  -- Replace lytes as needed  -- Nutrition: diabetic diet, thins; 1500mL fluid restriction      ID:   -- Afebrile; continue to monitor  -- Daily CBC        Heme/Onc:   -- H/H stable   -- Daily CBC      Endo:   -- Gluc goal 140-180  -- Endo consulted, appreciate recs      PPx:   Feeding: cardiac diet, 1500 cc fluid restriction  Analgesia/Sedation: PRN pain meds/ not indicated  Thromboembolic prevention: SCDs  HOB >30: yes  Stress Ulcer ppx: famotidine  Glucose control: Critical care goal 140-180 g/dl, ISS     Lines/Drains/Airway: PIV, art line, RIJ CVC x2, provena vac, landers, CT x1 pleural, pacer wires      Dispo/Code  Status/Palliative:   -- Step down today / Full code         Critical care was time spent personally by me on the following activities: development of treatment plan with patient or surrogate and bedside caregivers, discussions with consultants, evaluation of patient's response to treatment, examination of patient, ordering and performing treatments and interventions, ordering and review of laboratory studies, ordering and review of radiographic studies, pulse oximetry, re-evaluation of patient's condition.  This critical care time did not overlap with that of any other provider or involve time for any procedures.     Jammie Molina MD  Critical Care - Surgery  [facility]

## 2020-09-12 NOTE — PLAN OF CARE
Reviewed plan of care with patient.  Patient is S/P CABG x 2 Post-Op Day #3.  Patient is alert, oriented x 4, assist x 1, and runs NSR on the monitor.  8L nasal cannula, Florencio, Respiratory Therapist will set up High Flow.  Insulin gtt infusing @ 2 u/hr. Blood glucose monitoring will be completed 4 times daily before meals and at bedtime, and 0200 hours. Lasix 40 mg IVP to diurese. CT Pleural connected to suction at 40 mm Hg, output 20 ml.  Pacer wires isolated and secured.  Prevena Wound VAC in connected to MSI (4 lights).  Mason Catheter will remain until am per team's request.  Right IJ removed, dressing CDI.  Encouraged patient to limit fluid intake to 1500 ml/day.  Daily weights.  Patient has remained free of falls/trauma/injury.  Patient verbalized understanding of all instructions.

## 2020-09-12 NOTE — SUBJECTIVE & OBJECTIVE
Interval History/Significant Events: No acute events. Mediastinal tubes pulled yesterday and pleural with only 10 cc SS fluid overnight. Continues on BiPAP at night but is using his IS and gets OOB. BP now well controlled with PO meds, has been off cardene for >24 hours.    Follow-up For: Procedure(s) (LRB):  CORONARY ARTERY BYPASS GRAFT (CABG), left internal mammary artery harvest (N/A)  SURGICAL PROCUREMENT, VEIN, ENDOSCOPIC (Left)  CLOSURE, WOUND, STERNUM (N/A)  APPLICATION, WOUND VAC, Prevena 40 x 5    Post-Operative Day: 3 Days Post-Op    Objective:     Vital Signs (Most Recent):  Temp: 98.3 °F (36.8 °C) (09/12/20 0700)  Pulse: 70 (09/12/20 0745)  Resp: 17 (09/12/20 0745)  BP: (!) 106/55 (09/12/20 0700)  SpO2: 95 % (09/12/20 0745) Vital Signs (24h Range):  Temp:  [97.4 °F (36.3 °C)-98.5 °F (36.9 °C)] 98.3 °F (36.8 °C)  Pulse:  [63-75] 70  Resp:  [13-33] 17  SpO2:  [87 %-100 %] 95 %  BP: ()/(50-65) 106/55  Arterial Line BP: ()/() 113/53     Weight: 123 kg (271 lb 2.7 oz)  Body mass index is 40.04 kg/m².      Intake/Output Summary (Last 24 hours) at 9/12/2020 0755  Last data filed at 9/12/2020 0700  Gross per 24 hour   Intake 1250.2 ml   Output 1710 ml   Net -459.8 ml       Physical Exam  Vitals signs and nursing note reviewed.   Constitutional:       Appearance: Normal appearance.   Eyes:      Extraocular Movements: Extraocular movements intact.   Cardiovascular:      Rate and Rhythm: Normal rate and regular rhythm.      Pulses: Normal pulses.   Pulmonary:      Effort: Pulmonary effort is normal. No respiratory distress.      Comments: Chest incision with provena in place. Good seal noted  Chest tubes x3 in place. SS output noted  Abdominal:      General: There is no distension.      Tenderness: There is no abdominal tenderness.      Comments: obese   Skin:     General: Skin is warm and dry.      Capillary Refill: Capillary refill takes less than 2 seconds.   Neurological:      General: No  focal deficit present.      Mental Status: He is alert.   Psychiatric:         Mood and Affect: Mood normal.         Vents:  Vent Mode: A/C (09/09/20 1904)  Set Rate: 16 BPM (09/09/20 1904)  Vt Set: 440 mL (09/09/20 1904)  PEEP/CPAP: 5 cmH20 (09/09/20 1904)  Oxygen Concentration (%): 65 (09/11/20 0600)  Peak Airway Pressure: 0 cmH2O (09/09/20 1904)  Plateau Pressure: 0 cmH20 (09/09/20 1904)  Total Ve: 0 mL (09/09/20 1904)    Lines/Drains/Airways     Central Venous Catheter Line                 Percutaneous Central Line Insertion/Assessment - Quad lumen  09/09/20 0901 2 days    Percutaneous Central Line Insertion/Assessment - Quad Lumen  09/09/20 0901 right internal jugular 2 days          Drain                 Chest Tube 09/09/20 1311 3 Left Pleural 2 days         Urethral Catheter 09/09/20 1012 Non-latex 14 Fr. 2 days          Arterial Line            Arterial Line 09/09/20 0816 Left Radial 2 days          Line                 Pacer Wires 09/09/20 1300 2 days          Peripheral Intravenous Line                 Peripheral IV - Single Lumen 09/08/20 1100 18 G Right Hand 3 days                Significant Labs:    CBC/Anemia Profile:  Recent Labs   Lab 09/11/20  0153 09/12/20  0509   WBC 18.20* 17.46*   HGB 10.2* 9.9*   HCT 32.7* 32.6*    200   MCV 91 92   RDW 13.7 13.8        Chemistries:  Recent Labs   Lab 09/10/20  1825 09/11/20  0153 09/12/20  0509   * 134* 136   K 4.6 4.6 4.2    101 98   CO2 26 26 29   BUN 14 17 27*   CREATININE 1.1 1.1 0.9   CALCIUM 8.5* 8.7 8.5*   MG 2.1 2.1 2.2   PHOS  --  3.0 3.6       ABGs:   Recent Labs   Lab 09/10/20  2340   PH 7.401   PCO2 39.2   HCO3 24.3   POCSATURATED 86*   BE 0     Coagulation:   Recent Labs   Lab 09/12/20  0509   INR 1.0   APTT 33.8*     All pertinent labs within the past 24 hours have been reviewed.    Significant Imaging:  I have reviewed and interpreted all pertinent imaging results/findings within the past 24 hours.     09/12/2020 CXR  Final  read for today pending  Persistent opacity in LLL likely atelectasis

## 2020-09-13 LAB
ANION GAP SERPL CALC-SCNC: 13 MMOL/L (ref 8–16)
BASOPHILS # BLD AUTO: 0.06 K/UL (ref 0–0.2)
BASOPHILS NFR BLD: 0.4 % (ref 0–1.9)
BUN SERPL-MCNC: 28 MG/DL (ref 8–23)
CALCIUM SERPL-MCNC: 9.2 MG/DL (ref 8.7–10.5)
CHLORIDE SERPL-SCNC: 93 MMOL/L (ref 95–110)
CO2 SERPL-SCNC: 28 MMOL/L (ref 23–29)
CREAT SERPL-MCNC: 1.4 MG/DL (ref 0.5–1.4)
DIFFERENTIAL METHOD: ABNORMAL
EOSINOPHIL # BLD AUTO: 0.7 K/UL (ref 0–0.5)
EOSINOPHIL NFR BLD: 4.3 % (ref 0–8)
ERYTHROCYTE [DISTWIDTH] IN BLOOD BY AUTOMATED COUNT: 13.8 % (ref 11.5–14.5)
EST. GFR  (AFRICAN AMERICAN): 58.8 ML/MIN/1.73 M^2
EST. GFR  (NON AFRICAN AMERICAN): 50.9 ML/MIN/1.73 M^2
GLUCOSE SERPL-MCNC: 316 MG/DL (ref 70–110)
HCT VFR BLD AUTO: 35.3 % (ref 40–54)
HGB BLD-MCNC: 11.3 G/DL (ref 14–18)
IMM GRANULOCYTES # BLD AUTO: 0.08 K/UL (ref 0–0.04)
IMM GRANULOCYTES NFR BLD AUTO: 0.5 % (ref 0–0.5)
INR PPP: 1 (ref 0.8–1.2)
LYMPHOCYTES # BLD AUTO: 2.2 K/UL (ref 1–4.8)
LYMPHOCYTES NFR BLD: 13.7 % (ref 18–48)
MAGNESIUM SERPL-MCNC: 1.9 MG/DL (ref 1.6–2.6)
MCH RBC QN AUTO: 28.9 PG (ref 27–31)
MCHC RBC AUTO-ENTMCNC: 32 G/DL (ref 32–36)
MCV RBC AUTO: 90 FL (ref 82–98)
MONOCYTES # BLD AUTO: 1.2 K/UL (ref 0.3–1)
MONOCYTES NFR BLD: 7.8 % (ref 4–15)
NEUTROPHILS # BLD AUTO: 11.5 K/UL (ref 1.8–7.7)
NEUTROPHILS NFR BLD: 73.3 % (ref 38–73)
NRBC BLD-RTO: 0 /100 WBC
PHOSPHATE SERPL-MCNC: 4.3 MG/DL (ref 2.7–4.5)
PLATELET # BLD AUTO: 300 K/UL (ref 150–350)
PMV BLD AUTO: 11.5 FL (ref 9.2–12.9)
POCT GLUCOSE: 180 MG/DL (ref 70–110)
POCT GLUCOSE: 193 MG/DL (ref 70–110)
POCT GLUCOSE: 205 MG/DL (ref 70–110)
POCT GLUCOSE: 225 MG/DL (ref 70–110)
POCT GLUCOSE: 229 MG/DL (ref 70–110)
POCT GLUCOSE: 256 MG/DL (ref 70–110)
POTASSIUM SERPL-SCNC: 4.3 MMOL/L (ref 3.5–5.1)
PROTHROMBIN TIME: 11.4 SEC (ref 9–12.5)
RBC # BLD AUTO: 3.91 M/UL (ref 4.6–6.2)
SODIUM SERPL-SCNC: 134 MMOL/L (ref 136–145)
WBC # BLD AUTO: 15.71 K/UL (ref 3.9–12.7)

## 2020-09-13 PROCEDURE — 94660 CPAP INITIATION&MGMT: CPT

## 2020-09-13 PROCEDURE — 27000221 HC OXYGEN, UP TO 24 HOURS

## 2020-09-13 PROCEDURE — 25000003 PHARM REV CODE 250: Performed by: STUDENT IN AN ORGANIZED HEALTH CARE EDUCATION/TRAINING PROGRAM

## 2020-09-13 PROCEDURE — C9399 UNCLASSIFIED DRUGS OR BIOLOG: HCPCS | Performed by: NURSE PRACTITIONER

## 2020-09-13 PROCEDURE — 99232 SBSQ HOSP IP/OBS MODERATE 35: CPT | Mod: ,,, | Performed by: NURSE PRACTITIONER

## 2020-09-13 PROCEDURE — 63600175 PHARM REV CODE 636 W HCPCS: Performed by: NURSE PRACTITIONER

## 2020-09-13 PROCEDURE — 99900035 HC TECH TIME PER 15 MIN (STAT)

## 2020-09-13 PROCEDURE — 83735 ASSAY OF MAGNESIUM: CPT

## 2020-09-13 PROCEDURE — 25000003 PHARM REV CODE 250: Performed by: NURSE PRACTITIONER

## 2020-09-13 PROCEDURE — 63600175 PHARM REV CODE 636 W HCPCS: Performed by: STUDENT IN AN ORGANIZED HEALTH CARE EDUCATION/TRAINING PROGRAM

## 2020-09-13 PROCEDURE — 99232 PR SUBSEQUENT HOSPITAL CARE,LEVL II: ICD-10-PCS | Mod: ,,, | Performed by: NURSE PRACTITIONER

## 2020-09-13 PROCEDURE — 85610 PROTHROMBIN TIME: CPT

## 2020-09-13 PROCEDURE — 85025 COMPLETE CBC W/AUTO DIFF WBC: CPT

## 2020-09-13 PROCEDURE — 27100171 HC OXYGEN HIGH FLOW UP TO 24 HOURS

## 2020-09-13 PROCEDURE — 36415 COLL VENOUS BLD VENIPUNCTURE: CPT

## 2020-09-13 PROCEDURE — 20600001 HC STEP DOWN PRIVATE ROOM

## 2020-09-13 PROCEDURE — 84100 ASSAY OF PHOSPHORUS: CPT

## 2020-09-13 PROCEDURE — 80048 BASIC METABOLIC PNL TOTAL CA: CPT

## 2020-09-13 PROCEDURE — 94761 N-INVAS EAR/PLS OXIMETRY MLT: CPT

## 2020-09-13 RX ORDER — INSULIN ASPART 100 [IU]/ML
8-12 INJECTION, SOLUTION INTRAVENOUS; SUBCUTANEOUS
Status: DISCONTINUED | OUTPATIENT
Start: 2020-09-13 | End: 2020-09-14 | Stop reason: HOSPADM

## 2020-09-13 RX ORDER — INSULIN ASPART 100 [IU]/ML
0-5 INJECTION, SOLUTION INTRAVENOUS; SUBCUTANEOUS
Status: DISCONTINUED | OUTPATIENT
Start: 2020-09-13 | End: 2020-09-14 | Stop reason: HOSPADM

## 2020-09-13 RX ORDER — IBUPROFEN 200 MG
24 TABLET ORAL
Status: DISCONTINUED | OUTPATIENT
Start: 2020-09-13 | End: 2020-09-14 | Stop reason: HOSPADM

## 2020-09-13 RX ORDER — IBUPROFEN 200 MG
16 TABLET ORAL
Status: DISCONTINUED | OUTPATIENT
Start: 2020-09-13 | End: 2020-09-14 | Stop reason: HOSPADM

## 2020-09-13 RX ORDER — GLUCAGON 1 MG
1 KIT INJECTION
Status: DISCONTINUED | OUTPATIENT
Start: 2020-09-13 | End: 2020-09-14 | Stop reason: HOSPADM

## 2020-09-13 RX ADMIN — ATORVASTATIN CALCIUM 80 MG: 40 TABLET, FILM COATED ORAL at 08:09

## 2020-09-13 RX ADMIN — INSULIN ASPART 12 UNITS: 100 INJECTION, SOLUTION INTRAVENOUS; SUBCUTANEOUS at 04:09

## 2020-09-13 RX ADMIN — INSULIN DETEMIR 36 UNITS: 100 INJECTION, SOLUTION SUBCUTANEOUS at 09:09

## 2020-09-13 RX ADMIN — INSULIN ASPART 2 UNITS: 100 INJECTION, SOLUTION INTRAVENOUS; SUBCUTANEOUS at 12:09

## 2020-09-13 RX ADMIN — AMLODIPINE BESYLATE 10 MG: 10 TABLET ORAL at 08:09

## 2020-09-13 RX ADMIN — POLYETHYLENE GLYCOL 3350 17 G: 17 POWDER, FOR SOLUTION ORAL at 08:09

## 2020-09-13 RX ADMIN — ACETAMINOPHEN 650 MG: 325 TABLET ORAL at 08:09

## 2020-09-13 RX ADMIN — OXYCODONE 5 MG: 5 TABLET ORAL at 11:09

## 2020-09-13 RX ADMIN — INSULIN ASPART 1 UNITS: 100 INJECTION, SOLUTION INTRAVENOUS; SUBCUTANEOUS at 09:09

## 2020-09-13 RX ADMIN — TAMSULOSIN HYDROCHLORIDE 0.4 MG: 0.4 CAPSULE ORAL at 08:09

## 2020-09-13 RX ADMIN — ASPIRIN 325 MG ORAL TABLET 325 MG: 325 PILL ORAL at 08:09

## 2020-09-13 RX ADMIN — INSULIN ASPART 12 UNITS: 100 INJECTION, SOLUTION INTRAVENOUS; SUBCUTANEOUS at 12:09

## 2020-09-13 RX ADMIN — INSULIN ASPART 2 UNITS: 100 INJECTION, SOLUTION INTRAVENOUS; SUBCUTANEOUS at 07:09

## 2020-09-13 RX ADMIN — INSULIN ASPART 2 UNITS: 100 INJECTION, SOLUTION INTRAVENOUS; SUBCUTANEOUS at 04:09

## 2020-09-13 RX ADMIN — INSULIN ASPART 8 UNITS: 100 INJECTION, SOLUTION INTRAVENOUS; SUBCUTANEOUS at 07:09

## 2020-09-13 RX ADMIN — OXYCODONE 5 MG: 5 TABLET ORAL at 06:09

## 2020-09-13 RX ADMIN — FUROSEMIDE 40 MG: 10 INJECTION, SOLUTION INTRAMUSCULAR; INTRAVENOUS at 08:09

## 2020-09-13 RX ADMIN — OXYCODONE 5 MG: 5 TABLET ORAL at 08:09

## 2020-09-13 RX ADMIN — ACETAMINOPHEN 650 MG: 325 TABLET ORAL at 04:09

## 2020-09-13 RX ADMIN — METOPROLOL TARTRATE 12.5 MG: 25 TABLET, FILM COATED ORAL at 08:09

## 2020-09-13 RX ADMIN — DOCUSATE SODIUM 50MG AND SENNOSIDES 8.6MG 1 TABLET: 8.6; 5 TABLET, FILM COATED ORAL at 06:09
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
## 2020-09-13 NOTE — NURSING
Patient ambulated 56 feet, w/CT, Prevena Wound VAC, 5L oxygen, followed with wheelchair x 3 staff.  Patient SPO2 dropped to 77% initially on 5L then increased to 92-94% on 6L.  Patient's repositioned to recliner x 5 minutes, and oxygen assessed on room air, SPO2 92%.   Patient complained of SOB after walk.  Reapplied 3L nasal cannula.  Will continue to titrate.

## 2020-09-13 NOTE — PLAN OF CARE
Reviewed plan of care with very pleasant patient.  Patient is alert, oriented x 4, independent, ambulatory w/assist x 1, and runs NSR on the monitor.  Patient is CABG x 2 Post Op Day # 4.  Plan is discharge on Tuesday, am if patient is stable.  Insulin gtt discontinued.   K+ 4.3.  Mg+ 1.9, no replacement needed.  Lasix 40 mg IVP BID to diurese.  Blood glucose monitoring will be completed 4 times daily before meals, at bedtime,and 0200 hours.  Insulin Detemir 36U subq daily added to MAR. Insulin Pen education for administration will continue until discharge.  Mason discontinued, due to void at 1717 hours.  CT will be removed in am.  Pacer Wires Isolated and Secured.  Prevena Wound VAC to [facility].  Encouraged patient to limit fluid intake to 1500 ml/day.  Daily weights.  Patient has remained free of falls/trauma/injury.  Patient completed 1/4 walks. Patient verbalized understanding of all instructions.

## 2020-09-13 NOTE — PROGRESS NOTES
"[facility]  Endocrinology  Progress Note    Admit Date: 9/3/2020     Reason for Consult: Management of T2DM, Hyperglycemia     Surgical Procedure and Date: CABG x 2 20    Lab Results   Component Value Date    HGBA1C 9.4 (H) 2020     Diabetes diagnosis year: 10 years ago    Home Diabetes Medications: oral medication twice daily (patient does not recall name)     How often checking glucose at home? once every other day   BG readings on regimen: 130-180s  Hypoglycemia on the regimen?  No  Missed doses on regimen?  Yes    Diabetes Complications include:     Hyperglycemia    Complicating diabetes co morbidities:   History of MI      HPI:   Patient is a 70 y.o. male with a diagnosis of HLD, tobacco abuse, obesity, HTN, and T2DM who presented to OSH with NSTEMI. [place] [facility] [place] He is now s/p CABG and admitted to SICU. Endocrinology consulted for management of T2DM.            Interval HPI:   Overnight events: Transferred from SICU to CSU. POD #4. BG at or above goal ranges on current IV/SQ insulin regimen.    Eatin%  Nausea: No  Hypoglycemia and intervention: No  Fever: No  TPN and/or TF: No  If yes, type of TF/TPN and rate: none    /63 (BP Location: Left arm, Patient Position: Sitting)   Pulse 79   Temp 98.5 °F (36.9 °C) (Oral)   Resp 18   Ht 5' 9" (1.753 m)   Wt 117.6 kg (259 lb 4.2 oz)   SpO2 (!) 92%   BMI 38.29 kg/m²     Labs Reviewed and Include    No results for input(s): GLU, CALCIUM, ALBUMIN, PROT, NA, K, CO2, CL, BUN, CREATININE, ALKPHOS, ALT, AST, BILITOT in the last 24 hours.  Lab Results   Component Value Date    WBC 17.46 (H) 2020    HGB 9.9 (L) 2020    HCT 32.6 (L) 2020    MCV 92 2020     2020     No results for input(s): TSH, FREET4 in the last 168 hours.  Lab Results   Component Value Date    HGBA1C 9.4 (H) 2020       Nutritional status:   Body mass index is 38.29 kg/m².  Lab Results "   Component Value Date    ALBUMIN 3.8 09/10/2020    ALBUMIN 3.5 09/09/2020    ALBUMIN 2.9 (L) 09/06/2020     No results found for: PREALBUMIN    Estimated Creatinine Clearance: 98.1 mL/min (based on SCr of 0.9 mg/dL).    Accu-Checks  Recent Labs     09/11/20  0910 09/11/20  1128 09/11/20  1727 09/12/20  0407 09/12/20  0827 09/12/20  1218 09/12/20  1611 09/12/20  2112 09/12/20  2222 09/13/20  0229   POCTGLUCOSE 210* 271* 254* 154* 138* 228* 251* 243* 214* 180*       Current Medications and/or Treatments Impacting Glycemic Control  Immunotherapy:    Immunosuppressants     None        Steroids:   Hormones (From admission, onward)    None        Pressors:    Autonomic Drugs (From admission, onward)    Start     Stop Route Frequency Ordered    09/12/20 0900  metoprolol tartrate (LOPRESSOR) split tablet 12.5 mg      -- Oral 2 times daily 09/11/20 1047        Hyperglycemia/Diabetes Medications:   Antihyperglycemics (From admission, onward)    Start     Stop Route Frequency Ordered    09/11/20 0815  insulin aspart U-100 pen 6-8 Units      -- SubQ 3 times daily with meals 09/11/20 0802    09/10/20 1700  insulin regular 100 Units in sodium chloride 0.9% 100 mL infusion      -- IV Continuous 09/10/20 1546    09/10/20 1646  insulin aspart U-100 pen 0-10 Units      -- SubQ As needed (PRN) 09/10/20 1546          ASSESSMENT and PLAN    * NSTEMI (non-ST elevated myocardial infarction)  Managed per primary team  Optimize BG control        Uncontrolled type 2 diabetes mellitus without complication, without long-term current use of insulin  BG goal 140-180    Discontinue transition IV insulin infusion  Start Levemir 36 units daily (0.6 u/kg dosing)  Increase Novolog to 8-12 units TID with meals (basal/prandial match)   Low Dose Correction Scale  BG monitoring ac/hs    ** Please call Endocrine for any BG related issues **  ** Please notify Endocrine for any change and/or advance in diet**    Bedside nurse to instruct on insulin pen use  and blood sugar monitor. Have patient administer own injections after education completed supervised by nurse.    Discharge planning:   Lab Results   Component Value Date    HGBA1C 9.4 (H) 09/06/2020     Patient will need adjustments to home regimen given poorly controlled a1c. Please notify endocrine prior to discharge for updated recommendations.         Obesity (BMI 35.0-39.9 without comorbidity)  Body mass index is 38.29 kg/m².  May increase insulin resistance.         Mixed hyperlipidemia  May increase insulin resistance.         S/P CABG (coronary artery bypass graft)  Managed per primary team  Optimize BG control          Jacquelyn Bauman NP  Endocrinology  [facility]

## 2020-09-13 NOTE — SUBJECTIVE & OBJECTIVE
"Interval HPI:   Overnight events: Transferred from SICU to CSU. POD #4. BG at or above goal ranges on current IV/SQ insulin regimen.    Eatin%  Nausea: No  Hypoglycemia and intervention: No  Fever: No  TPN and/or TF: No  If yes, type of TF/TPN and rate: none    /63 (BP Location: Left arm, Patient Position: Sitting)   Pulse 79   Temp 98.5 °F (36.9 °C) (Oral)   Resp 18   Ht 5' 9" (1.753 m)   Wt 117.6 kg (259 lb 4.2 oz)   SpO2 (!) 92%   BMI 38.29 kg/m²     Labs Reviewed and Include    No results for input(s): GLU, CALCIUM, ALBUMIN, PROT, NA, K, CO2, CL, BUN, CREATININE, ALKPHOS, ALT, AST, BILITOT in the last 24 hours.  Lab Results   Component Value Date    WBC 17.46 (H) 2020    HGB 9.9 (L) 2020    HCT 32.6 (L) 2020    MCV 92 2020     2020     No results for input(s): TSH, FREET4 in the last 168 hours.  Lab Results   Component Value Date    HGBA1C 9.4 (H) 2020       Nutritional status:   Body mass index is 38.29 kg/m².  Lab Results   Component Value Date    ALBUMIN 3.8 09/10/2020    ALBUMIN 3.5 2020    ALBUMIN 2.9 (L) 2020     No results found for: PREALBUMIN    Estimated Creatinine Clearance: 98.1 mL/min (based on SCr of 0.9 mg/dL).    Accu-Checks  Recent Labs     20  0910 20  1128 20  1727 20  0407 20  0827 20  1218 20  1611 20  2112 20  2222 20  0229   POCTGLUCOSE 210* 271* 254* 154* 138* 228* 251* 243* 214* 180*       Current Medications and/or Treatments Impacting Glycemic Control  Immunotherapy:    Immunosuppressants     None        Steroids:   Hormones (From admission, onward)    None        Pressors:    Autonomic Drugs (From admission, onward)    Start     Stop Route Frequency Ordered    20 0900  metoprolol tartrate (LOPRESSOR) split tablet 12.5 mg      -- Oral 2 times daily 20 1047        Hyperglycemia/Diabetes Medications:   Antihyperglycemics (From admission, " onward)    Start     Stop Route Frequency Ordered    09/11/20 0815  insulin aspart U-100 pen 6-8 Units      -- SubQ 3 times daily with meals 09/11/20 0802    09/10/20 1700  insulin regular 100 Units in sodium chloride 0.9% 100 mL infusion      -- IV Continuous 09/10/20 1546    09/10/20 1646  insulin aspart U-100 pen 0-10 Units      -- SubQ As needed (PRN) 09/10/20 1540

## 2020-09-13 NOTE — PROGRESS NOTES
CT surgery progress note     A/P  70M admitted w/ NSTEMI from OSH now s/p 2v CABG (POD4)     Cardiac diet  Cont asa, statin, MTP  Continue diuresis  bipap qhs prn  Increase activity  pulm hygiene   D/c landers  Cont chest tubes   Bowel care  Continue CSU

## 2020-09-13 NOTE — NURSING
Patient reports right hand numbness and right leg numbness from the hip down.  Right Dorsalis Pedis Pulse located with Doppler.

## 2020-09-13 NOTE — NURSING
"Patient complained of "sweating and lightheadedness."  Patient does not present as diaphoretic.  BS = 256. Patient consumed apple juice 118 ml for breakfast meal.  Encouraged patient to avoid sugar-filled drinks.  "

## 2020-09-13 NOTE — NURSING
Patient ambulated in the hallway 132 feet with assist x 3, PCT followed w/recliner. Patient ambulated on room air maintained SPO2 92% for the duration of the walk, and seated x 5 minutes.  Patient denies chest pain and SOB. Elevated feet.  CT connected to suction, and Prevena Wound Vac connected to .

## 2020-09-13 NOTE — NURSING
Last BM Patient 9/8/20.  Patient given Miralax @ 0830 hours.  Patient is passing flatus and belching, and has attempted x 4 to evacuate his bowels today.

## 2020-09-13 NOTE — NURSING
"Patient states he is less "sweating" and not as lightheaded now.  Elevated patient's legs, will continue to assess.  "

## 2020-09-13 NOTE — PLAN OF CARE
Pt free of falls and injury during shift. POC reviewed with pt. VS stable. SR on telemetry. POC BG monitored q4h, 1 unit of SS insulin needed, insulin drip infusing. Pacer wires isolated and secured to dressing, CT dressing CDI, MSI wound vac in place with good seal. Pt wore bipap > 5 hours.  No acute events noted at this time. No complaints. Educated pt why they are at risk for falls and to use call light for assistance ambulating. Yellow non-slip socks on pt. Bed low and locked, call light with in reach. Will continue to monitor.

## 2020-09-13 NOTE — ASSESSMENT & PLAN NOTE
BG goal 140-180    Discontinue transition IV insulin infusion  Start Levemir 36 units daily (0.6 u/kg dosing)  Increase Novolog to 8-12 units TID with meals (basal/prandial match)   Low Dose Correction Scale  BG monitoring ac/hs    ** Please call Endocrine for any BG related issues **  ** Please notify Endocrine for any change and/or advance in diet**    Bedside nurse to instruct on insulin pen use and blood sugar monitor. Have patient administer own injections after education completed supervised by nurse.    Discharge planning:   Lab Results   Component Value Date    HGBA1C 9.4 (H) 09/06/2020     Patient will need adjustments to home regimen given poorly controlled a1c. Please notify endocrine prior to discharge for updated recommendations.

## 2020-09-14 VITALS
OXYGEN SATURATION: 94 % | HEART RATE: 82 BPM | DIASTOLIC BLOOD PRESSURE: 64 MMHG | WEIGHT: 259.25 LBS | HEIGHT: 69 IN | TEMPERATURE: 99 F | BODY MASS INDEX: 38.4 KG/M2 | RESPIRATION RATE: 18 BRPM | SYSTOLIC BLOOD PRESSURE: 135 MMHG

## 2020-09-14 PROBLEM — D72.829 LEUKOCYTOSIS: Status: ACTIVE | Noted: 2020-09-14

## 2020-09-14 PROBLEM — R53.81 DEBILITY: Status: ACTIVE | Noted: 2020-09-14

## 2020-09-14 PROBLEM — E87.1 HYPONATREMIA: Status: ACTIVE | Noted: 2020-09-14

## 2020-09-14 LAB
ANION GAP SERPL CALC-SCNC: 14 MMOL/L (ref 8–16)
APTT BLDCRRT: 32.6 SEC (ref 21–32)
BASOPHILS # BLD AUTO: 0.11 K/UL (ref 0–0.2)
BASOPHILS NFR BLD: 0.7 % (ref 0–1.9)
BUN SERPL-MCNC: 27 MG/DL (ref 8–23)
CALCIUM SERPL-MCNC: 8.8 MG/DL (ref 8.7–10.5)
CHLORIDE SERPL-SCNC: 92 MMOL/L (ref 95–110)
CO2 SERPL-SCNC: 29 MMOL/L (ref 23–29)
CREAT SERPL-MCNC: 0.8 MG/DL (ref 0.5–1.4)
DIFFERENTIAL METHOD: ABNORMAL
EOSINOPHIL # BLD AUTO: 1 K/UL (ref 0–0.5)
EOSINOPHIL NFR BLD: 6.4 % (ref 0–8)
ERYTHROCYTE [DISTWIDTH] IN BLOOD BY AUTOMATED COUNT: 13.5 % (ref 11.5–14.5)
EST. GFR  (AFRICAN AMERICAN): >60 ML/MIN/1.73 M^2
EST. GFR  (NON AFRICAN AMERICAN): >60 ML/MIN/1.73 M^2
GLUCOSE SERPL-MCNC: 151 MG/DL (ref 70–110)
HCT VFR BLD AUTO: 34.4 % (ref 40–54)
HGB BLD-MCNC: 10.8 G/DL (ref 14–18)
IMM GRANULOCYTES # BLD AUTO: 0.09 K/UL (ref 0–0.04)
IMM GRANULOCYTES NFR BLD AUTO: 0.6 % (ref 0–0.5)
INR PPP: 1 (ref 0.8–1.2)
LYMPHOCYTES # BLD AUTO: 2.7 K/UL (ref 1–4.8)
LYMPHOCYTES NFR BLD: 17 % (ref 18–48)
MAGNESIUM SERPL-MCNC: 1.9 MG/DL (ref 1.6–2.6)
MCH RBC QN AUTO: 28.2 PG (ref 27–31)
MCHC RBC AUTO-ENTMCNC: 31.4 G/DL (ref 32–36)
MCV RBC AUTO: 90 FL (ref 82–98)
MONOCYTES # BLD AUTO: 1.6 K/UL (ref 0.3–1)
MONOCYTES NFR BLD: 9.9 % (ref 4–15)
NEUTROPHILS # BLD AUTO: 10.5 K/UL (ref 1.8–7.7)
NEUTROPHILS NFR BLD: 65.4 % (ref 38–73)
NRBC BLD-RTO: 0 /100 WBC
PHOSPHATE SERPL-MCNC: 3.8 MG/DL (ref 2.7–4.5)
PLATELET # BLD AUTO: 367 K/UL (ref 150–350)
PMV BLD AUTO: 11.5 FL (ref 9.2–12.9)
POCT GLUCOSE: 163 MG/DL (ref 70–110)
POCT GLUCOSE: 229 MG/DL (ref 70–110)
POTASSIUM SERPL-SCNC: 3.7 MMOL/L (ref 3.5–5.1)
PROTHROMBIN TIME: 11.4 SEC (ref 9–12.5)
RBC # BLD AUTO: 3.83 M/UL (ref 4.6–6.2)
SODIUM SERPL-SCNC: 135 MMOL/L (ref 136–145)
WBC # BLD AUTO: 16.03 K/UL (ref 3.9–12.7)

## 2020-09-14 PROCEDURE — 80048 BASIC METABOLIC PNL TOTAL CA: CPT

## 2020-09-14 PROCEDURE — 99232 SBSQ HOSP IP/OBS MODERATE 35: CPT | Mod: ,,, | Performed by: NURSE PRACTITIONER

## 2020-09-14 PROCEDURE — 84100 ASSAY OF PHOSPHORUS: CPT

## 2020-09-14 PROCEDURE — 99222 1ST HOSP IP/OBS MODERATE 55: CPT | Mod: ,,, | Performed by: NURSE PRACTITIONER

## 2020-09-14 PROCEDURE — 36415 COLL VENOUS BLD VENIPUNCTURE: CPT

## 2020-09-14 PROCEDURE — 25000003 PHARM REV CODE 250: Performed by: STUDENT IN AN ORGANIZED HEALTH CARE EDUCATION/TRAINING PROGRAM

## 2020-09-14 PROCEDURE — 63600175 PHARM REV CODE 636 W HCPCS: Performed by: STUDENT IN AN ORGANIZED HEALTH CARE EDUCATION/TRAINING PROGRAM

## 2020-09-14 PROCEDURE — 25000003 PHARM REV CODE 250: Performed by: NURSE PRACTITIONER

## 2020-09-14 PROCEDURE — 85025 COMPLETE CBC W/AUTO DIFF WBC: CPT

## 2020-09-14 PROCEDURE — 83735 ASSAY OF MAGNESIUM: CPT

## 2020-09-14 PROCEDURE — 99222 PR INITIAL HOSPITAL CARE,LEVL II: ICD-10-PCS | Mod: ,,, | Performed by: NURSE PRACTITIONER

## 2020-09-14 PROCEDURE — 97110 THERAPEUTIC EXERCISES: CPT | Mod: CQ

## 2020-09-14 PROCEDURE — 97530 THERAPEUTIC ACTIVITIES: CPT | Mod: CQ

## 2020-09-14 PROCEDURE — 85730 THROMBOPLASTIN TIME PARTIAL: CPT

## 2020-09-14 PROCEDURE — 94660 CPAP INITIATION&MGMT: CPT

## 2020-09-14 PROCEDURE — 99900035 HC TECH TIME PER 15 MIN (STAT)

## 2020-09-14 PROCEDURE — 97535 SELF CARE MNGMENT TRAINING: CPT

## 2020-09-14 PROCEDURE — 99232 PR SUBSEQUENT HOSPITAL CARE,LEVL II: ICD-10-PCS | Mod: ,,, | Performed by: NURSE PRACTITIONER

## 2020-09-14 PROCEDURE — 85610 PROTHROMBIN TIME: CPT

## 2020-09-14 RX ORDER — SYRINGE AND NEEDLE,INSULIN,1ML 25GX1"
1 SYRINGE, EMPTY DISPOSABLE MISCELLANEOUS 2 TIMES DAILY
Qty: 100 EACH | Refills: 3 | COMMUNITY
Start: 2020-09-14 | End: 2020-12-13

## 2020-09-14 RX ORDER — LANOLIN ALCOHOL/MO/W.PET/CERES
400 CREAM (GRAM) TOPICAL ONCE
Status: COMPLETED | OUTPATIENT
Start: 2020-09-14 | End: 2020-09-14

## 2020-09-14 RX ORDER — METFORMIN HYDROCHLORIDE 500 MG/1
1000 TABLET, EXTENDED RELEASE ORAL 2 TIMES DAILY WITH MEALS
Qty: 360 TABLET | Refills: 11 | Status: SHIPPED | OUTPATIENT
Start: 2020-09-14 | End: 2021-09-14

## 2020-09-14 RX ORDER — SEMAGLUTIDE 1.34 MG/ML
0.25 INJECTION, SOLUTION SUBCUTANEOUS WEEKLY
Qty: 2 SYRINGE | Refills: 0 | Status: SHIPPED | OUTPATIENT
Start: 2020-09-14 | End: 2020-09-14 | Stop reason: HOSPADM

## 2020-09-14 RX ORDER — POTASSIUM CHLORIDE 20 MEQ/1
20 TABLET, EXTENDED RELEASE ORAL 2 TIMES DAILY
Status: DISCONTINUED | OUTPATIENT
Start: 2020-09-14 | End: 2020-09-14 | Stop reason: HOSPADM

## 2020-09-14 RX ORDER — SEMAGLUTIDE 1.34 MG/ML
0.75 INJECTION, SOLUTION SUBCUTANEOUS WEEKLY
Qty: 1 SYRINGE | Refills: 2 | Status: SHIPPED | OUTPATIENT
Start: 2020-09-14 | End: 2020-09-14 | Stop reason: HOSPADM

## 2020-09-14 RX ORDER — INSULIN GLARGINE 100 [IU]/ML
28 INJECTION, SOLUTION SUBCUTANEOUS DAILY
Qty: 8.4 ML | Refills: 2 | Status: SHIPPED | OUTPATIENT
Start: 2020-09-15 | End: 2020-09-14 | Stop reason: HOSPADM

## 2020-09-14 RX ADMIN — AMLODIPINE BESYLATE 10 MG: 10 TABLET ORAL at 08:09

## 2020-09-14 RX ADMIN — ACETAMINOPHEN 650 MG: 325 TABLET ORAL at 08:09

## 2020-09-14 RX ADMIN — POTASSIUM CHLORIDE 20 MEQ: 1500 TABLET, EXTENDED RELEASE ORAL at 10:09

## 2020-09-14 RX ADMIN — Medication 400 MG: at 08:09

## 2020-09-14 RX ADMIN — METOPROLOL TARTRATE 12.5 MG: 25 TABLET, FILM COATED ORAL at 08:09

## 2020-09-14 RX ADMIN — ASPIRIN 325 MG ORAL TABLET 325 MG: 325 PILL ORAL at 08:09

## 2020-09-14 RX ADMIN — TAMSULOSIN HYDROCHLORIDE 0.4 MG: 0.4 CAPSULE ORAL at 08:09

## 2020-09-14 RX ADMIN — INSULIN DETEMIR 36 UNITS: 100 INJECTION, SOLUTION SUBCUTANEOUS at 08:09

## 2020-09-14 RX ADMIN — OXYCODONE 5 MG: 5 TABLET ORAL at 08:09

## 2020-09-14 RX ADMIN — INSULIN ASPART 8 UNITS: 100 INJECTION, SOLUTION INTRAVENOUS; SUBCUTANEOUS at 08:09

## 2020-09-14 RX ADMIN — INSULIN ASPART 2 UNITS: 100 INJECTION, SOLUTION INTRAVENOUS; SUBCUTANEOUS at 12:09

## 2020-09-14 RX ADMIN — INSULIN ASPART 12 UNITS: 100 INJECTION, SOLUTION INTRAVENOUS; SUBCUTANEOUS at 12:09

## 2020-09-14 RX ADMIN — FUROSEMIDE 40 MG: 10 INJECTION, SOLUTION INTRAMUSCULAR; INTRAVENOUS at 08:09

## 2020-09-14 RX ADMIN — POLYETHYLENE GLYCOL 3350 17 G: 17 POWDER, FOR SOLUTION ORAL at 08:09

## 2020-09-14 RX ADMIN — ACETAMINOPHEN 650 MG: 325 TABLET ORAL at 03:09

## 2020-09-14 RX ADMIN — ACETAMINOPHEN 650 MG: 325 TABLET ORAL at 04:09

## 2020-09-14 NOTE — HPI
Per chart review, Bc Garcia is a 70-year-old male with PMHx of HLD, tobacco abuse (quit 1995), obesity, HTN and DM2.  Patient presented to [facility] from OSH on 9/3 with chest pain and was found to have a NSTEMI. At [facility], he was found to have L main disease.  Now s/p CABG x 2 with wound vac placement with CTS. Chest tube in place. Hospital course further complicated by leuckoytosis.     Functional History: Patient lives in [place] with spouse in a single story home with 5 steps to enter.  Prior to admission, (I) with ADLs and mobility. DME: none.

## 2020-09-14 NOTE — ASSESSMENT & PLAN NOTE
Sternal Precautions  PT/OT  Ambulate  ASA  BB  Replaced mag and potassium  D/c chest tube and prevena    Dispo: D/c to rehab this week

## 2020-09-14 NOTE — ASSESSMENT & PLAN NOTE
BG goal 140-180    Continue Levemir 36 units daily (0.6 u/kg dosing)  Continue Novolog 8-12 units TID with meals (basal/prandial match)   Low Dose Correction Scale  BG monitoring ac/hs    ** Please call Endocrine for any BG related issues **  ** Please notify Endocrine for any change and/or advance in diet**    Bedside nurse to instruct on insulin pen use and blood sugar monitor. Have patient administer own injections after education completed supervised by nurse.    Discharge planning:   Lab Results   Component Value Date    HGBA1C 9.4 (H) 09/06/2020     Given a1c, recommend the following changes to DM regimen:  -   Discontinue Glipizide  -   Start Metformin 500mg ER pills:  1 pill once daily at night for 1 week THEN  1 pill with breakfast, 1 pill with dinner for 1 week THEN  1 pill with breakfast, 2 pills with dinner for 1 week THEN  2 pills with breakfast, 2 pills with dinner     -  Start Lantus 28 units once daily (0.5 u/kg dosing)   -  Start Ozempic 0.25 mg SC injection q wk x 4 wk, then incr. to 0.5 mg qwk x 4wk, then incr to   1 mg qwk    Patient states he has working glucometer and testing supplies at home. Blood sugar logs provided to patient with instructions to document BG 4 x daily. Instructions given to call with BG < 80 or consistently > 200. Reviewed Metformin and Ozempic start up. Patient requesting to follow up with PCP for continued DM management. Instructed to schedule apt with PCP at discharge. Patient provided with [facility] [place] information and addressed envelope to mail blood glucose logs to clinic. Patient in agreement with plan of care.     Discharge Teaching:    Reviewed topics related to DM including: the need for insulin, how insulin works, what makes it a high risk medication, the importance of follow up with PCP or endocrine provider, importance of and how to check BG, how to record BG on logs, how to administer insulin, appropriate insulin administration sites, importance of  rotating injection sites, hyper/hypoglycemia, how and when to treat hypoglycemia, when to hold insulin, and when to seek medical attention.  Patient verbalized understanding, answered all questions to patient's satisfaction.

## 2020-09-14 NOTE — PLAN OF CARE
Pt free of falls and injury during shift. POC reviewed with pt. VS stable. SR on telemetry. POC BG monitored,1 unit of SS insulin needed. Pacer wires isolated and secured to dressing, CT dressing CDI, MSI wound vac in place with good seal. Pt wore bipap > 5 hours.  Pt complained of numbing sensation to right upper leg, +2 pulse and capillary refill <3 sec noted to foot, Oneal Lam MD with CTS  made aware. No acute events noted at this time. No complaints. Educated pt why they are at risk for falls and to use call light for assistance ambulating. Yellow non-slip socks on pt. Bed low and locked, call light with in reach. Will continue to monitor.

## 2020-09-14 NOTE — PROGRESS NOTES
[facility]     Department of Hospital Medicine     [address] [place] [address]     [phone] after hours  [phone] fax                                        FACILITY TRANSFER ORDERS     Patient Name: Bc Garcia  YOB: [birth year]    Admit to:  [facility]     Diagnoses:  Active Hospital Problems    Diagnosis  POA    S/P CABG (coronary artery bypass graft) [Z95.1]  Not Applicable     Priority: 1 - High    Debility [R53.81]  No    Hyponatremia [E87.1]  No    Leukocytosis [D72.829]  No    CAD (coronary artery disease) [I25.10]  No    Chest pain [R07.9]  No    ACS (acute coronary syndrome) [I24.9]  Yes    Obesity (BMI 35.0-39.9 without comorbidity) [E66.9]  Yes    Uncontrolled type 2 diabetes mellitus without complication, without long-term current use of insulin [E11.65]  Yes    Essential hypertension [I10]  Yes    Mixed hyperlipidemia [E78.2]  Yes      Resolved Hospital Problems    Diagnosis Date Resolved POA    ACS (acute coronary syndrome) [I24.9] 09/04/2020 Yes       Vital Signs: Routine.    Allergies:Review of patient's allergies indicates:  No Known Allergies    Diet: cardiac diet     Acitivities: activity as tolerated and no driving for 4 weeks    Nursing: Sternal Precautions. Sternal Incision Care: Wash every day with plain soap and water then pat dry. Leave open to air.     Labs: BMP, CBC daily for 3 days    CONSULTS:       Physical Therapy to evaluate and treat     Occupational Therapy to evaluate and treat      DIABETES CARE:    SN to perform and educate Diabetic management with blood glucose monitoring:      Fingerstick blood sugar AC and HS    Report CBG < 60 or > 350 to physician.                                          Insulin Sliding Scale          Glucose  Novolog Insulin Subcutaneous        0 - 60   Orange juice or glucose tablet, hold insulin      No insulin   201-250  2 units   251-300  4  "units   301-350  6 units   351-400  8 units   >400   10 units then call physician    Medications:    Bc Garcia   Home Medication Instructions KAILASH:[id number]    Printed on:09/14/20 1500   Medication Information                      Amlodipine 10 mg oral daily.            Aspirin 325 mg oral daily.           Furosemide 20 mg oral     Take one tablet by mouth twice daily for 7 days then decrease to once daily            insulin NPH-insulin regular, 70/30, (NOVOLIN 70/30 U-100 INSULIN) 100 unit/mL (70-30) injection  Inject 25 Units into the skin 2 (two) times daily.             insulin syringe-needle U-100 (BD INSULIN SYRINGE) 1 mL 25 gauge x 5/8" Syrg  1 Box by Misc.(Non-Drug; Combo Route) route 2 (two) times a day.             metFORMIN 500 MG 24hr tablet oral  Start Metformin 500mg pills: 1 pill once daily at night for 3-4 nights THEN 1 pill with breakfast, 1 pill with dinner for next 3-4 days THEN 1 pill with breakfast, 2 pills with dinner for next 3-4 days THEN 2 pills with breakfast, 2 pills with dinner            Metoprolol Tartrate 12.5 mg oral twice a day.            Potassium 20 meq oral     Take one tablet by mouth twice daily for 7 days then decrease to once daily            rosuvastatin (CRESTOR) 20 MG tablet  Take 20 mg by mouth once daily.           Oxycodone 5mg by mouth every 4 hr as needed for pain.           Tamsulosin 0.4mg oral daily.                        _________________________________  Rosalva Rivera NP  09/14/2020    "

## 2020-09-14 NOTE — SUBJECTIVE & OBJECTIVE
Past Medical History:   Diagnosis Date    Diabetes mellitus     Hyperlipidemia     Hypertension     Morbid obesity due to excess calories      Past Surgical History:   Procedure Laterality Date    APPLICATION OF WOUND VACUUM-ASSISTED CLOSURE DEVICE  9/9/2020    Procedure: APPLICATION, WOUND VAC, Prevena 40 x 5;  Surgeon: Ej Vazquez MD;  [facility];  Service: Cardiovascular;;    CORONARY ARTERY BYPASS GRAFT (CABG) N/A 9/9/2020    Procedure: CORONARY ARTERY BYPASS GRAFT (CABG), left internal mammary artery harvest;  Surgeon: Ej Vazquez MD;  [facility];  Service: Cardiovascular;  Laterality: N/A;    ENDOSCOPIC HARVEST OF VEIN Left 9/9/2020    Procedure: SURGICAL PROCUREMENT, VEIN, ENDOSCOPIC;  Surgeon: Ej Vazquez MD;  [facility];  Service: Cardiovascular;  Laterality: Left;    LEFT HEART CATHETERIZATION Left 9/4/2020    Procedure: Left heart cath, radial, 9 am;  Surgeon: Kiel Rey MD;  Location: [facility] CATH LAB;  Service: Cardiology;  Laterality: Left;    left knee skin excision Left     STERNAL WOUND CLOSURE N/A 9/9/2020    Procedure: CLOSURE, WOUND, STERNUM;  Surgeon: Ej Vazquez MD;  [facility];  Service: Cardiovascular;  Laterality: N/A;     Review of patient's allergies indicates:  No Known Allergies    Scheduled Medications:    acetaminophen  650 mg Oral TID    amLODIPine  10 mg Oral Daily    aspirin  325 mg Oral Daily    atorvastatin  80 mg Oral QHS    furosemide (LASIX) IV  40 mg Intravenous BID    insulin aspart U-100  8-12 Units Subcutaneous TIDWM    insulin detemir U-100  36 Units Subcutaneous Daily    metoprolol tartrate  12.5 mg Oral BID    polyethylene glycol  17 g Oral Daily    potassium chloride  20 mEq Oral BID    tamsulosin  0.4 mg Oral Daily       PRN Medications: acetaminophen, aspirin, dextrose 50%, dextrose 50%, glucagon (human recombinant), glucose, glucose, hydrALAZINE, insulin aspart U-100,  ondansetron, oxyCODONE, oxyCODONE, pneumoc 13-wilian conj-dip cr(PF), promethazine (PHENERGAN) IVPB, senna-docusate 8.6-50 mg, sodium chloride 0.9%    Family History     Problem Relation (Age of Onset)    Cancer Father    Diabetes Paternal Grandmother    Stroke Mother        Tobacco Use    Smoking status: Former Smoker     Packs/day: 2.00     Years: 20.00     Pack years: 40.00     Types: Cigarettes     Quit date: 1995     Years since quittin.0   Substance and Sexual Activity    Alcohol use: Not Currently     Comment: Quit in     Drug use: Never    Sexual activity: Not on file     Review of Systems   Constitutional: Positive for activity change. Negative for fatigue and fever.   HENT: Negative for trouble swallowing and voice change.    Eyes: Negative for photophobia and visual disturbance.   Respiratory: Negative for cough and shortness of breath.    Cardiovascular: Negative for chest pain and palpitations.   Gastrointestinal: Negative for nausea and vomiting.   Genitourinary: Negative for difficulty urinating and flank pain.   Musculoskeletal: Positive for gait problem. Negative for arthralgias.   Skin: Positive for wound (surgical). Negative for color change.   Neurological: Positive for weakness. Negative for facial asymmetry, speech difficulty and numbness.   Psychiatric/Behavioral: Negative for agitation and confusion.     Objective:     Vital Signs (Most Recent):  Temp: 97.7 °F (36.5 °C) (20 1109)  Pulse: 77 (20 1109)  Resp: 18 (20 110)  BP: (!) 94/53 (20 1109)  SpO2: (!) 94 % (20 1109)    Vital Signs (24h Range):  Temp:  [97.7 °F (36.5 °C)-98.8 °F (37.1 °C)] 97.7 °F (36.5 °C)  Pulse:  [70-78] 77  Resp:  [16-18] 18  SpO2:  [77 %-96 %] 94 %  BP: ()/(53-65) 94/53     Body mass index is 38.29 kg/m².    Physical Exam  Constitutional:       General: He is not in acute distress.     Appearance: He is well-developed.      Comments: Sitting [facility]    HENT:      Head:  Normocephalic and atraumatic.   Eyes:      General:         Right eye: No discharge.         Left eye: No discharge.   Neck:      Musculoskeletal: Neck supple.   Cardiovascular:      Pulses: Normal pulses.   Pulmonary:      Effort: Pulmonary effort is normal. No respiratory distress.   Abdominal:      General: There is no distension.      Palpations: Abdomen is soft.   Musculoskeletal:         General: No deformity.   Skin:     General: Skin is warm and dry.      Comments: midsternal incision dressing in place   Neurological:      Mental Status: He is alert and oriented to person, place, and time.      Motor: Weakness (general, no focal weakness) present.      Comments: Follows commands    Psychiatric:         Behavior: Behavior normal.         Cognition and Memory: Cognition is not impaired.       NEUROLOGICAL EXAMINATION:     MENTAL STATUS   Oriented to person, place, and time.       Diagnostic Results:   Labs: Reviewed  ECG: Reviewed  X-Ray: Reviewed  CT: Reviewed

## 2020-09-14 NOTE — PROGRESS NOTES
"[facility]  Endocrinology  Progress Note    Admit Date: 9/3/2020     Reason for Consult: Management of T2DM, Hyperglycemia     Surgical Procedure and Date: CABG x 2 20    Lab Results   Component Value Date    HGBA1C 9.4 (H) 2020     Diabetes diagnosis year: 10 years ago    Home Diabetes Medications: oral medication twice daily (patient does not recall name)     How often checking glucose at home? once every other day   BG readings on regimen: 130-180s  Hypoglycemia on the regimen?  No  Missed doses on regimen?  Yes    Diabetes Complications include:     Hyperglycemia    Complicating diabetes co morbidities:   History of MI      HPI:   Patient is a 70 y.o. male with a diagnosis of HLD, tobacco abuse, obesity, HTN, and T2DM who presented to OSH with NSTEMI. He was transferred to [facility] for CABG evaluation. He is now s/p CABG and admitted to SICU. Endocrinology consulted for management of T2DM.            Interval HPI:   Overnight events: Remains in CSU. POD # 5. BG within goal ranges this morning on current SQ insulin regimen. Possible d/c today per primary team.  Eatin%  Nausea: No  Hypoglycemia and intervention: No  Fever: No  TPN and/or TF: No  If yes, type of TF/TPN and rate: none    BP (!) 115/59 (BP Location: Left arm, Patient Position: Lying)   Pulse 77   Temp 97.7 °F (36.5 °C) (Oral)   Resp 16   Ht 5' 9" (1.753 m)   Wt 117.6 kg (259 lb 4.2 oz)   SpO2 (!) 92%   BMI 38.29 kg/m²     Labs Reviewed and Include    Recent Labs   Lab 20  0450   *   CALCIUM 8.8   *   K 3.7   CO2 29   CL 92*   BUN 27*   CREATININE 0.8     Lab Results   Component Value Date    WBC 16.03 (H) 2020    HGB 10.8 (L) 2020    HCT 34.4 (L) 2020    MCV 90 2020     (H) 2020     No results for input(s): TSH, FREET4 in the last 168 hours.  Lab Results   Component Value Date    HGBA1C 9.4 (H) 2020       Nutritional status:   Body mass index is " 38.29 kg/m².  Lab Results   Component Value Date    ALBUMIN 3.8 09/10/2020    ALBUMIN 3.5 09/09/2020    ALBUMIN 2.9 (L) 09/06/2020     No results found for: PREALBUMIN    Estimated Creatinine Clearance: 110.3 mL/min (based on SCr of 0.8 mg/dL).    Accu-Checks  Recent Labs     09/12/20  1611 09/12/20  2112 09/12/20  2222 09/13/20  0229 09/13/20  0743 09/13/20  1039 09/13/20  1145 09/13/20  1633 09/13/20  2107 09/14/20  0808   POCTGLUCOSE 251* 243* 214* 180* 193* 256* 229* 225* 205* 163*       Current Medications and/or Treatments Impacting Glycemic Control  Immunotherapy:    Immunosuppressants     None        Steroids:   Hormones (From admission, onward)    None        Pressors:    Autonomic Drugs (From admission, onward)    Start     Stop Route Frequency Ordered    09/12/20 0900  metoprolol tartrate (LOPRESSOR) split tablet 12.5 mg      -- Oral 2 times daily 09/11/20 1047        Hyperglycemia/Diabetes Medications:   Antihyperglycemics (From admission, onward)    Start     Stop Route Frequency Ordered    09/13/20 1130  insulin aspart U-100 pen 8-12 Units      -- SubQ 3 times daily with meals 09/13/20 0816    09/13/20 0930  insulin detemir U-100 pen 36 Units      -- SubQ Daily 09/13/20 0816    09/13/20 0916  insulin aspart U-100 pen 0-5 Units      -- SubQ Before meals & nightly PRN 09/13/20 0816          ASSESSMENT and PLAN    * NSTEMI (non-ST elevated myocardial infarction)  Managed per primary team  Optimize BG control        Uncontrolled type 2 diabetes mellitus without complication, without long-term current use of insulin  BG goal 140-180    Continue Levemir 36 units daily (0.6 u/kg dosing)  Continue Novolog 8-12 units TID with meals (basal/prandial match)   Low Dose Correction Scale  BG monitoring ac/hs    ** Please call Endocrine for any BG related issues **  ** Please notify Endocrine for any change and/or advance in diet**    Bedside nurse to instruct on insulin pen use and blood sugar monitor. Have patient  administer own injections after education completed supervised by nurse.    Discharge planning:   Lab Results   Component Value Date    HGBA1C 9.4 (H) 09/06/2020     Given a1c, recommend the following changes to DM regimen:  -   Discontinue Glipizide   -   Start Metformin 500mg ER pills:  1 pill once daily at night for 1 week THEN  1 pill with breakfast, 1 pill with dinner for 1 week THEN  1 pill with breakfast, 2 pills with dinner for 1 week THEN  2 pills with breakfast, 2 pills with dinner     -  Start Novolin 70/30 ([employer] brand) vial/syringe 25 units BID     Patient states he has working glucometer and testing supplies at home. Blood sugar logs provided to patient with instructions to document BG 4 x daily. Instructions given to call with BG < 80 or consistently > 200. Reviewed Metformin start up. Insulin Vial/Syringe teaching complete with patient. Patient requesting to follow up with PCP for continued DM management. Instructed to schedule apt with PCP at discharge. Patient provided with [facility] endocrine contact information and addressed envelope to mail blood glucose logs to clinic. Patient in agreement with plan of care.     Discharge Teaching:    Reviewed topics related to DM including: the need for insulin, how insulin works, what makes it a high risk medication, the importance of follow up with PCP or endocrine provider, importance of and how to check BG, how to record BG on logs, how to administer insulin, appropriate insulin administration sites, importance of rotating injection sites, hyper/hypoglycemia, how and when to treat hypoglycemia, when to hold insulin, and when to seek medical attention.  Patient verbalized understanding, answered all questions to patient's satisfaction.        Obesity (BMI 35.0-39.9 without comorbidity)  Body mass index is 38.29 kg/m².  May increase insulin resistance.         Mixed hyperlipidemia  May increase insulin resistance.         S/P CABG (coronary artery bypass  graft)  Managed per primary team  Optimize BG control          Jacquelyn Bauman, GONZÁLEZ  Endocrinology  [facility]

## 2020-09-14 NOTE — DISCHARGE SUMMARY
[facility]  Cardiothoracic Surgery  Discharge Summary      Patient Name: Bc Garcia  MRN: [id number]  Admission Date: 9/3/2020  Hospital Length of Stay: 11 days  Discharge Date and Time:  09/14/2020 3:14 PM  Attending Physician: Ej Vazquez MD   Discharging Provider: Rosalva Rivera NP  Primary Care Provider: Primary Doctor No    HPI:   This is a 70-year-old male presented with non ST elevated MI to oxygen [facility].  Patient underwent an angiogram and was found to have severe left main disease.  Patient was transferred for surgical revascularization.    Risks, benefits, and different treatment options were discussed. Pt. Wanted to undergo surgical revascularization. The patient understood all risks and benefits and consented to the operation.    Procedure(s) (LRB):  CORONARY ARTERY BYPASS GRAFT (CABG), left internal mammary artery harvest (N/A)  SURGICAL PROCUREMENT, VEIN, ENDOSCOPIC (Left)  CLOSURE, WOUND, STERNUM (N/A)  APPLICATION, WOUND VAC, Prevena 40 x 5      Hospital Course: On 9/9/20 the patient was taken to the Operating Room for the above stated procedure. Please see the previously dictated operative report for complete details. Postoperatively, the patient was taken from the  Operating Room to the ICU where the vital signs were monitored and pain was kept under control. The patient was weaned from the drips and extubated in the ICU per protocol. Once hemodynamically stable, the patient was transferred to the Cardiac Step-Down floor for continued strengthening and ambulation. On postoperative day 5, the patient was ready for discharge to rehab. At the time of discharge, the patient was ambulating unassisted. Pain was well controlled with oral analgesics and the patient was tolerating the diet.     MOBILITY AND ACTIVITY: As tolerated. Patient may shower. No heavy lifting of greater than 5 pounds and no driving.     DIET: An 1800-calorie ADA with a 1500 mL fluid restriction.      WOUND CARE INSTRUCTIONS: Check for redness, swelling and drainage around the  incision or wound. Patient is to call for any obvious bleeding, drainage, pus from the wound, unusual problems or difficulties or temperature of greater than 101   degrees.     FOLLOWUP: Follow up with  in approximately 3 weeks. Prior to this  appointment, the patient will have a chest x-ray and EKG.     Patient not placed on Ace-Inhibitor at the time of discharge due to potential for hypotension         DISCHARGE CONDITION: At the time of discharge, the patient was in sinus rhythm and afebrile with stable vital signs.      Consults (From admission, onward)        Status Ordering Provider     Inpatient consult to Cardiology  Once     Provider:  Mick Manuel MD    Completed KEITH ASCENCIO     Inpatient consult to Endocrinology  Once     Provider:  (Not yet assigned)    Completed BRITNI COVARRUBIAS     Inpatient consult to Physical Medicine Rehab  Once     Provider:  (Not yet assigned)    Completed AUSTEN BARRAGAN     Inpatient consult to Social Work/Case Management  Once     Provider:  (Not yet assigned)    Completed KEITH ASCENCIO          Pending Diagnostic Studies:     None          No new Assessment & Plan notes have been filed under this hospital service since the last note was generated.  Service: Cardiothoracic Surgery    Final Active Diagnoses:    Diagnosis Date Noted POA    S/P CABG (coronary artery bypass graft) [Z95.1] 09/10/2020 Not Applicable    Debility [R53.81] 09/14/2020 No    Hyponatremia [E87.1] 09/14/2020 No    Leukocytosis [D72.829] 09/14/2020 No    CAD (coronary artery disease) [I25.10]  No    Chest pain [R07.9]  No    ACS (acute coronary syndrome) [I24.9] 09/05/2020 Yes    Obesity (BMI 35.0-39.9 without comorbidity) [E66.9] 09/04/2020 Yes    Uncontrolled type 2 diabetes mellitus without complication, without long-term current use of insulin [E11.65] 09/04/2020 Yes    Essential hypertension  [I10] 09/04/2020 Yes    Mixed hyperlipidemia [E78.2] 09/04/2020 Yes      Problems Resolved During this Admission:    Diagnosis Date Noted Date Resolved POA    ACS (acute coronary syndrome) [I24.9] 09/03/2020 09/04/2020 Yes      Discharged Condition: stable    Disposition: Rehab Facility    Follow Up:  Follow-up Information     Schedule an appointment as soon as possible for a visit with Kiel Rey MD.    Specialties: Cardiology, INTERVENTIONAL CARDIOLOGY  Contact information:  [address]  [address]  [place] [id number] [phone]                 Medications:  Please see transfer orders    Time spent on the discharge of patient: 35 minutes    Rosalva Rivera NP  Cardiothoracic Surgery  [facility]

## 2020-09-14 NOTE — HOSPITAL COURSE
On 9/9/20 the patient was taken to the Operating Room for the above stated procedure. Please see the previously dictated operative report for complete details. Postoperatively, the patient was taken from the  Operating Room to the ICU where the vital signs were monitored and pain was kept under control. The patient was weaned from the drips and extubated in the ICU per protocol. Once hemodynamically stable, the patient was transferred to the Cardiac Step-Down floor for continued strengthening and ambulation. On postoperative day 5, the patient was ready for discharge to home. At the time of discharge, the patient was ambulating unassisted. Pain was well controlled with oral analgesics and the patient was tolerating the diet.     MOBILITY AND ACTIVITY: As tolerated. Patient may shower. No heavy lifting of greater than 5 pounds and no driving.     DIET: An 1800-calorie ADA with a 1500 mL fluid restriction.     WOUND CARE INSTRUCTIONS: Check for redness, swelling and drainage around the  incision or wound. Patient is to call for any obvious bleeding, drainage, pus from the wound, unusual problems or difficulties or temperature of greater than 101   degrees.     FOLLOWUP: Follow up with  in approximately 3 weeks. Prior to this  appointment, the patient will have a chest x-ray and EKG.     Patient not placed on Ace-Inhibitor at the time of discharge due to potential for hypotension         DISCHARGE CONDITION: At the time of discharge, the patient was in sinus rhythm and afebrile with stable vital signs.

## 2020-09-14 NOTE — HPI
This is a 70-year-old male presented with non ST elevated MI to oxygen [facility].  Patient underwent an angiogram and was found to have severe left main disease.  Patient was transferred for surgical revascularization.    Risks, benefits, and different treatment options were discussed. Pt. Wanted to undergo surgical revascularization. The patient understood all risks and benefits and consented to the operation.

## 2020-09-14 NOTE — PLAN OF CARE
09/14/20 1535   Final Note   Assessment Type Final Discharge Note   Anticipated Discharge Disposition Rehab   Hospital Follow Up  Appt(s) scheduled? Yes   Discharge plans and expectations educations in teach back method with documentation complete? Yes     Pt to d/c home with [facility].    Future Appointments   Date Time Provider Department Center   10/5/2020 11:30 AM EKG, APPT NOM EKG [facility]   10/5/2020 11:45 AM [facility] [id number] LB LIMIT [facility] XRAY OP [facility]   10/5/2020 12:00 PM Ej Vazquez MD [facility] CARDVAS [facility]     Julie Haase RN  Case Management [phone]

## 2020-09-14 NOTE — PLAN OF CARE
Pt goals remain appropriate, continue w/ OT POC.    Problem: Occupational Therapy Goal  Goal: Occupational Therapy Goal  Description: Goals to be met by: 9/21/20     Patient will increase functional independence with ADLs by performing:    Feeding with [place].  UE Dressing with Supervision.  LE Dressing with Supervision.  Grooming while standing at sink with Supervision.  Toileting from toilet with Supervision for hygiene and clothing management.   Toilet transfer to toilet with Supervision.    Outcome: Ongoing, Progressing   Gregory HORVATH  9/14/2020

## 2020-09-14 NOTE — NURSING
Report called to EZEQUIEL Almendarez at rehab facility. Son is here to transport patient to facility via personal vehicle. PIV and telemetry removed.

## 2020-09-14 NOTE — HOSPITAL COURSE
09/11/2020: Sit to stand CGA.  Ambulated 5 steps CGA.  UBD and LBD MaxA.  9/14: PT-Sit to stand CGA. Ambulated 150 ft with CGA w/ SOB.

## 2020-09-14 NOTE — SUBJECTIVE & OBJECTIVE
"Interval HPI:   Overnight events: Remains in CSU. POD # 5. BG within goal ranges this morning on current SQ insulin regimen. Possible d/c today per primary team.  Eatin%  Nausea: No  Hypoglycemia and intervention: No  Fever: No  TPN and/or TF: No  If yes, type of TF/TPN and rate: none    BP (!) 115/59 (BP Location: Left arm, Patient Position: Lying)   Pulse 77   Temp 97.7 °F (36.5 °C) (Oral)   Resp 16   Ht 5' 9" (1.753 m)   Wt 117.6 kg (259 lb 4.2 oz)   SpO2 (!) 92%   BMI 38.29 kg/m²     Labs Reviewed and Include    Recent Labs   Lab 20  0450   *   CALCIUM 8.8   *   K 3.7   CO2 29   CL 92*   BUN 27*   CREATININE 0.8     Lab Results   Component Value Date    WBC 16.03 (H) 2020    HGB 10.8 (L) 2020    HCT 34.4 (L) 2020    MCV 90 2020     (H) 2020     No results for input(s): TSH, FREET4 in the last 168 hours.  Lab Results   Component Value Date    HGBA1C 9.4 (H) 2020       Nutritional status:   Body mass index is 38.29 kg/m².  Lab Results   Component Value Date    ALBUMIN 3.8 09/10/2020    ALBUMIN 3.5 2020    ALBUMIN 2.9 (L) 2020     No results found for: PREALBUMIN    Estimated Creatinine Clearance: 110.3 mL/min (based on SCr of 0.8 mg/dL).    Accu-Checks  Recent Labs     20  1611 20  2112 20  2222 20  0229 20  0743 20  1039 20  1145 20  1633 20  2107 20  0808   POCTGLUCOSE 251* 243* 214* 180* 193* 256* 229* 225* 205* 163*       Current Medications and/or Treatments Impacting Glycemic Control  Immunotherapy:    Immunosuppressants     None        Steroids:   Hormones (From admission, onward)    None        Pressors:    Autonomic Drugs (From admission, onward)    Start     Stop Route Frequency Ordered    20 0900  metoprolol tartrate (LOPRESSOR) split tablet 12.5 mg      -- Oral 2 times daily 20 1047        Hyperglycemia/Diabetes Medications: "   Antihyperglycemics (From admission, onward)    Start     Stop Route Frequency Ordered    09/13/20 1130  insulin aspart U-100 pen 8-12 Units      -- SubQ 3 times daily with meals 09/13/20 0816 09/13/20 0930  insulin detemir U-100 pen 36 Units      -- SubQ Daily 09/13/20 0816    09/13/20 0916  insulin aspart U-100 pen 0-5 Units      -- SubQ Before meals & nightly PRN 09/13/20 0816

## 2020-09-14 NOTE — SUBJECTIVE & OBJECTIVE
Interval History: Pt doing well and ready to go home but has no electricity yet, live in [place].     Review of Systems   Constitution: Negative for malaise/fatigue.   Cardiovascular: Negative for chest pain, claudication, dyspnea on exertion, irregular heartbeat, leg swelling and palpitations.   Respiratory: Negative for cough and shortness of breath.    Hematologic/Lymphatic: Negative for bleeding problem.   Gastrointestinal: Negative for abdominal pain.   Genitourinary: Negative for dysuria.   Neurological: Negative for headaches and weakness.     Medications:  Continuous Infusions:  Scheduled Meds:   acetaminophen  650 mg Oral TID    amLODIPine  10 mg Oral Daily    aspirin  325 mg Oral Daily    atorvastatin  80 mg Oral QHS    furosemide (LASIX) IV  40 mg Intravenous BID    insulin aspart U-100  8-12 Units Subcutaneous TIDWM    insulin detemir U-100  36 Units Subcutaneous Daily    metoprolol tartrate  12.5 mg Oral BID    polyethylene glycol  17 g Oral Daily    potassium chloride  20 mEq Oral BID    tamsulosin  0.4 mg Oral Daily     PRN Meds:acetaminophen, aspirin, dextrose 50%, dextrose 50%, glucagon (human recombinant), glucose, glucose, hydrALAZINE, insulin aspart U-100, ondansetron, oxyCODONE, oxyCODONE, pneumoc 13-wilian conj-dip cr(PF), promethazine (PHENERGAN) IVPB, senna-docusate 8.6-50 mg, sodium chloride 0.9%     Objective:     Vital Signs (Most Recent):  Temp: 97.7 °F (36.5 °C) (09/14/20 1109)  Pulse: 78 (09/14/20 1335)  Resp: 18 (09/14/20 1109)  BP: (!) 94/53 (09/14/20 1109)  SpO2: (!) 94 % (09/14/20 1109) Vital Signs (24h Range):  Temp:  [97.7 °F (36.5 °C)-98.8 °F (37.1 °C)] 97.7 °F (36.5 °C)  Pulse:  [70-78] 78  Resp:  [16-18] 18  SpO2:  [89 %-96 %] 94 %  BP: ()/(53-65) 94/53     Weight: 117.6 kg (259 lb 4.2 oz)  Body mass index is 38.29 kg/m².    SpO2: (!) 94 %  O2 Device (Oxygen Therapy): room air    Intake/Output - Last 3 Shifts       09/12 0700 - 09/13 0659 09/13 0700 - 09/14  0659 09/14 0700 - 09/15 0659    P.O. 960 1660 480    I.V. (mL/kg)       Total Intake(mL/kg) 960 (7.8) 1660 (14.1) 480 (4.1)    Urine (mL/kg/hr) 2715 (0.9) 2300 (0.8) 700 (0.9)    Other 0 0     Stool  0 0    Chest Tube 180 80     Total Output 2895 2380 700    Net -1935 -720 -220           Stool Occurrence  0 x 1 x          Lines/Drains/Airways     Central Venous Catheter Line                 Percutaneous Central Line Insertion/Assessment - Quad lumen  09/09/20 0901 5 days          Peripheral Intravenous Line                 Peripheral IV - Single Lumen 09/12/20 1255 20 G Anterior;Left Hand 2 days         Peripheral IV - Single Lumen 09/12/20 1256 20 G Anterior;Right Hand 2 days                Physical Exam  Constitutional:       Appearance: He is well-developed.   Cardiovascular:      Rate and Rhythm: Normal rate and regular rhythm.      Heart sounds: Normal heart sounds.   Pulmonary:      Effort: Pulmonary effort is normal.      Breath sounds: Normal breath sounds.   Abdominal:      Palpations: Abdomen is soft.   Musculoskeletal: Normal range of motion.   Skin:     General: Skin is warm and dry.      Comments: Sternal Incision CDI   Neurological:      Mental Status: He is alert and oriented to person, place, and time.         Significant Labs:  BMP:   Recent Labs   Lab 09/14/20  0450   *   *   K 3.7   CL 92*   CO2 29   BUN 27*   CREATININE 0.8   CALCIUM 8.8   MG 1.9     CBC:   Recent Labs   Lab 09/14/20  0450   WBC 16.03*   RBC 3.83*   HGB 10.8*   HCT 34.4*   *   MCV 90   MCH 28.2   MCHC 31.4*       Significant Diagnostics:  I have reviewed all pertinent imaging results/findings within the past 24 hours.

## 2020-09-14 NOTE — CONSULTS
Inpatient consult to Physical Medicine Rehab  Consult performed by: Allyson Sanchez NP  Consult ordered by: Ej Vazquez MD  Reason for consult: assess rehab needs        Reviewed patient history and current admission.  Rehab team following.  Full consult to follow.    VERONICA Hopkins, FNP-C  Physical Medicine & Rehabilitation   09/14/2020

## 2020-09-14 NOTE — PT/OT/SLP PROGRESS
Occupational Therapy   Co-Treatment w/ PTA    Name: Bc Garcia  MRN: [id number]  Admitting Diagnosis:  S/P CABG (coronary artery bypass graft)  5 Days Post-Op    Recommendations:     Discharge Recommendations: home health OT  Discharge Equipment Recommendations:  none  Barriers to discharge:  None    Assessment:     Bc Garcia is a 70 y.o. male with a medical diagnosis of S/P CABG (coronary artery bypass graft).  He presents with improving mobility, but still limited by multiple lines and decreased endurance that limited his participation in therapy. Performance deficits affecting function are impaired functional mobilty, weakness, impaired endurance.     Rehab Prognosis:  Fair; patient would benefit from acute skilled OT services to address these deficits and reach maximum level of function.       Plan:     Patient to be seen 5 x/week to address the above listed problems via self-care/home management, therapeutic activities, therapeutic exercises  · Plan of Care Expires: 10/11/20  · Plan of Care Reviewed with: patient    Subjective     Pain/Comfort:  · Pain Rating 1: 0/10  · Pain Rating Post-Intervention 1: 0/10    Objective:   PTA present during entire session.   Communicated with: RN prior to session.  Patient found up in chair with arterial line, blood pressure cuff, central line, wound vac, chest tube upon OT entry to room.    General Precautions: Standard, fall, sternal   Orthopedic Precautions:N/A   Braces: N/A     Occupational Performance:     Bed Mobility:    ·  Pt UIC when OT/PTA entered the room.     Functional Mobility/Transfers:  · Patient completed Sit <> Stand Transfer with stand by assistance  with  no assistive device   · Patient completed Toilet Transfer Step Transfer technique with stand by assistance with  no AD  Functional Mobility:  Pt was able to walk short household distances w/ no AD,  Asisstance w/ mulitple lines, and SBA.      Activities of Daily Living:  · Bathing: stand by assistance  standing at sink to wash hands  · Lower Body Dressing: stand by assistance seated in chair to don/doff hospital socks.   · Toileting: supervision seated on toilet w/ setup and assistance w/ clothing.      [facility] 6 Click ADL: 21    Treatment & Education:  -Pt edu on OT role/POC  -Importance of OOB activity with staff assistance  -Safety during functional t/f and mobility  - White board updated  - Multiple self care tasks/functional mobility completed-- assistance level noted above  - All questions/concerns answered within OT scope of practice      Patient left up in chair with all lines intact, call button in reach and RN notifiedEducation:      GOALS:   Multidisciplinary Problems     Occupational Therapy Goals        Problem: Occupational Therapy Goal    Goal Priority Disciplines Outcome Interventions   Occupational Therapy Goal     OT, PT/OT Ongoing, Progressing    Description: Goals to be met by: 9/21/20     Patient will increase functional independence with ADLs by performing:    Feeding with [place].  UE Dressing with Supervision.  LE Dressing with Supervision.  Grooming while standing at sink with Supervision.  Toileting from toilet with Supervision for hygiene and clothing management.   Toilet transfer to toilet with Supervision.                     Time Tracking:     OT Date of Treatment: 09/14/20  OT Start Time: 1017  OT Stop Time: 1040  OT Total Time (min): 23 min    Billable Minutes:Self Care/Home Management 23 mins    Gregory Garvey OT  9/14/2020

## 2020-09-14 NOTE — CONSULTS
[facility]  Physical Medicine & Rehab  Consult Note    Patient Name: Bc Garcia  MRN: [id number]  Admission Date: 9/3/2020  Hospital Length of Stay: 11 days  Attending Physician: Ej Vazquez MD     Inpatient consult to Physical Medicine & Rehabilitation  Consult performed by: Allyson Sanchez NP  Consult requested by:  Ej Vazquez MD    Reason for Consult:  assess rehabilitation needs  Consults  Subjective:     Principal Problem: NSTEMI (non-ST elevated myocardial infarction)    HPI: Per chart review, Bc Garcia is a 70-year-old male with PMHx of HLD, tobacco abuse (quit 1995), obesity, HTN and DM2.  Patient presented to [facility] from OSH on 9/3 with chest pain and was found to have a NSTEMI. At [facility], he was found to have L main disease.  Now s/p CABG x 2 with wound vac placement with CTS. Chest tube in place. Hospital course further complicated by leuckoytosis.     Functional History: Patient lives in [place] with spouse in a single story home with 5 steps to enter.  Prior to admission, (I) with ADLs and mobility. DME: none.     Hospital Course: 09/11/2020: Sit to stand CGA.  Ambulated 5 steps CGA.  UBD and LBD MaxA.  9/14: PT-Sit to stand CGA. Ambulated 150 ft with CGA w/ SOB.     Past Medical History:   Diagnosis Date    Diabetes mellitus     Hyperlipidemia     Hypertension     Morbid obesity due to excess calories      Past Surgical History:   Procedure Laterality Date    APPLICATION OF WOUND VACUUM-ASSISTED CLOSURE DEVICE  9/9/2020    Procedure: APPLICATION, WOUND VAC, Prevena 40 x 5;  Surgeon: Ej Vazquez MD;  Location: [facility] OR [address] [place] [address];  Service: Cardiovascular;;    CORONARY ARTERY BYPASS GRAFT (CABG) N/A 9/9/2020    Procedure: CORONARY ARTERY BYPASS GRAFT (CABG), left internal mammary artery harvest;  Surgeon: Ej Vazquez MD;  Location: [facility] OR [address] [place] [address];  Service: Cardiovascular;  Laterality: N/A;    ENDOSCOPIC HARVEST OF VEIN Left 9/9/2020    Procedure: SURGICAL PROCUREMENT,  VEIN, ENDOSCOPIC;  Surgeon: Ej Vazquez MD;  [facility];  Service: Cardiovascular;  Laterality: Left;    LEFT HEART CATHETERIZATION Left 2020    Procedure: Left heart cath, radial, 9 am;  Surgeon: Kiel Rey MD;  Location: [facility] CATH LAB;  Service: Cardiology;  Laterality: Left;    left knee skin excision Left     STERNAL WOUND CLOSURE N/A 2020    Procedure: CLOSURE, WOUND, STERNUM;  Surgeon: Ej Vazquez MD;  [facility];  Service: Cardiovascular;  Laterality: N/A;     Review of patient's allergies indicates:  No Known Allergies    Scheduled Medications:    acetaminophen  650 mg Oral TID    amLODIPine  10 mg Oral Daily    aspirin  325 mg Oral Daily    atorvastatin  80 mg Oral QHS    furosemide (LASIX) IV  40 mg Intravenous BID    insulin aspart U-100  8-12 Units Subcutaneous TIDWM    insulin detemir U-100  36 Units Subcutaneous Daily    metoprolol tartrate  12.5 mg Oral BID    polyethylene glycol  17 g Oral Daily    potassium chloride  20 mEq Oral BID    tamsulosin  0.4 mg Oral Daily       PRN Medications: acetaminophen, aspirin, dextrose 50%, dextrose 50%, glucagon (human recombinant), glucose, glucose, hydrALAZINE, insulin aspart U-100, ondansetron, oxyCODONE, oxyCODONE, pneumoc 13-wilian conj-dip cr(PF), promethazine (PHENERGAN) IVPB, senna-docusate 8.6-50 mg, sodium chloride 0.9%    Family History     Problem Relation (Age of Onset)    Cancer Father    Diabetes Paternal Grandmother    Stroke Mother        Tobacco Use    Smoking status: Former Smoker     Packs/day: 2.00     Years: 20.00     Pack years: 40.00     Types: Cigarettes     Quit date: 1995     Years since quittin.0   Substance and Sexual Activity    Alcohol use: Not Currently     Comment: Quit in     Drug use: Never    Sexual activity: Not on file     Review of Systems   Constitutional: Positive for activity change. Negative for fatigue and fever.   HENT: Negative for trouble  swallowing and voice change.    Eyes: Negative for photophobia and visual disturbance.   Respiratory: Negative for cough and shortness of breath.    Cardiovascular: Negative for chest pain and palpitations.   Gastrointestinal: Negative for nausea and vomiting.   Genitourinary: Negative for difficulty urinating and flank pain.   Musculoskeletal: Positive for gait problem. Negative for arthralgias.   Skin: Positive for wound (surgical). Negative for color change.   Neurological: Positive for weakness. Negative for facial asymmetry, speech difficulty and numbness.   Psychiatric/Behavioral: Negative for agitation and confusion.     Objective:     Vital Signs (Most Recent):  Temp: 97.7 °F (36.5 °C) (09/14/20 1109)  Pulse: 77 (09/14/20 1109)  Resp: 18 (09/14/20 1109)  BP: (!) 94/53 (09/14/20 1109)  SpO2: (!) 94 % (09/14/20 1109)    Vital Signs (24h Range):  Temp:  [97.7 °F (36.5 °C)-98.8 °F (37.1 °C)] 97.7 °F (36.5 °C)  Pulse:  [70-78] 77  Resp:  [16-18] 18  SpO2:  [77 %-96 %] 94 %  BP: ()/(53-65) 94/53     Body mass index is 38.29 kg/m².    Physical Exam  Constitutional:       General: He is not in acute distress.     Appearance: He is well-developed.      Comments: Sitting [facility]    HENT:      Head: Normocephalic and atraumatic.   Eyes:      General:         Right eye: No discharge.         Left eye: No discharge.   Neck:      Musculoskeletal: Neck supple.   Cardiovascular:      Pulses: Normal pulses.   Chest tube in place   Pulmonary:      Effort: Pulmonary effort is normal. No respiratory distress.   Abdominal:      General: There is no distension.      Palpations: Abdomen is soft.   Musculoskeletal:         General: No deformity.   Skin:     General: Skin is warm and dry.      Comments: midsternal incision dressing in place   Neurological:      Mental Status: He is alert and oriented to person, place, and time.      Motor: Weakness (general, no focal weakness) present.      Comments: Follows commands     Psychiatric:         Behavior: Behavior normal.         Cognition and Memory: Cognition is not impaired.     Diagnostic Results:   Labs: Reviewed  ECG: Reviewed  X-Ray: Reviewed  CT: Reviewed    Assessment/Plan:     * NSTEMI (non-ST elevated myocardial infarction)  -chest pain and elevated troponin at OSH and found to have NSTEMI  -transferred to [facility] and found to have L main disease  -now s/p CABG x 2 with wound vac placement with CTS  -Chest tube in place    Debility  See hospital course for functional status.      Recommendations  -  Encourage mobility, OOB in chair, and early ambulation as appropriate  -  PT/OT evaluate and treat  -  Pain management  -  DVT prophylaxis if appropriate   -  Monitor for and prevent skin breakdown and pressure ulcers  · Early mobility, repositioning/weight shifting every 20-30 minutes when sitting, turn patient every 2 hours, proper mattress/overlay and chair cushioning, pressure relief/heel protector boots    PAC recommendation: Inpatient Rehab        Thank you for your consult.     Allyson Sanchez NP  Department of Physical Medicine & Rehab  [facility]

## 2020-09-14 NOTE — PLAN OF CARE
09/14/20 1034   Post-Acute Status   Post-Acute Authorization Placement   Post-Acute Placement Status Referrals Sent     SW sent referral to [facility] (ph [phone]/fx [phone]) per family's request.  Will continue to follow.    Lucinda Kerr LMSW  [facility]  [id number]

## 2020-09-14 NOTE — PLAN OF CARE
Problem: Physical Therapy Goal  Goal: Physical Therapy Goal  Description: Goals to be met by: 2020     Patient will increase functional independence with mobility by performin. Sit to stand transfer with Supervision  2. Bed to chair transfer with Supervision using LRAD  3. Gait  x 150 feet with Supervision using LRAD.   4. Ascend/descend 5 stair with no Handrails Stand-by Assistance using no AD.   5. Lower extremity exercise program x20 reps per handout, with independence  6. Recite 3/3 sternal precautions and remain complaint with precautions throughout session with no verbal cues    Outcome: Ongoing, Progressing

## 2020-09-14 NOTE — PLAN OF CARE
09/14/20 1705   Post-Acute Status   Post-Acute Authorization Placement   Post-Acute Placement Status Set-up Complete     [facility]  Pt to be transported by personal vehicle.  EZEQUIEL Wheeler to call report to [phone].  Treatment team and EZEQUIEL Wheeler notified of the above.    Lucinda Kerr LMSW  [facility]  [id number]

## 2020-09-14 NOTE — PLAN OF CARE
09/14/20 0921   Discharge Reassessment   Assessment Type Discharge Planning Reassessment   Provided patient/caregiver education on the expected discharge date and the discharge plan Yes   Do you have any problems affording any of your prescribed medications? No   Discharge Plan A Rehab   Discharge Plan B Rehab   DME Needed Upon Discharge  none   Anticipated Discharge Disposition Rehab     Pt stepped down from ICU.    Pt s/p CABG POD 5.    Anticipate d/c home to rehab when medically ready. SW/CM will continue to follow and assist with d/c planning. Patient will have help from spouse at d/c. Spoke with patient's wife and patient does not have power at home. She would like patient to d/c to [facility] ([phone]) and spoke with Gwen.     Julie Haase RN  Case Management [phone]

## 2020-09-14 NOTE — PLAN OF CARE
09/14/20 1356   Post-Acute Status   Post-Acute Authorization Placement   Post-Acute Placement Status Pending Post-Acute Clinical Review     GURPREET received call from Julian with [facility] confirming that referral was received and is under review.  GURPREET sent additional information per Julian's request.  Will continue to follow.    Lucinda Kerr LMSW  [facility]  [id number]     verbal instruction/written material

## 2020-09-14 NOTE — PROGRESS NOTES
[facility]  Cardiothoracic Surgery  Progress Note    Patient Name: Bc Garcia  MRN: [id number]  Admission Date: 9/3/2020  Hospital Length of Stay: 11 days  Code Status: Full Code   Attending Physician: Ej Vazquez MD   Referring Provider: Alex Sykes Jr., MD  Principal Problem:<principal problem not specified>      Subjective:     Post-Op Info:  Procedure(s) (LRB):  CORONARY ARTERY BYPASS GRAFT (CABG), left internal mammary artery harvest (N/A)  SURGICAL PROCUREMENT, VEIN, ENDOSCOPIC (Left)  CLOSURE, WOUND, STERNUM (N/A)  APPLICATION, WOUND VAC, Prevena 40 x 5   5 Days Post-Op     Interval History: Pt doing well and ready to go home but has no electricity yet, live in [place].     Review of Systems   Constitution: Negative for malaise/fatigue.   Cardiovascular: Negative for chest pain, claudication, dyspnea on exertion, irregular heartbeat, leg swelling and palpitations.   Respiratory: Negative for cough and shortness of breath.    Hematologic/Lymphatic: Negative for bleeding problem.   Gastrointestinal: Negative for abdominal pain.   Genitourinary: Negative for dysuria.   Neurological: Negative for headaches and weakness.     Medications:  Continuous Infusions:  Scheduled Meds:   acetaminophen  650 mg Oral TID    amLODIPine  10 mg Oral Daily    aspirin  325 mg Oral Daily    atorvastatin  80 mg Oral QHS    furosemide (LASIX) IV  40 mg Intravenous BID    insulin aspart U-100  8-12 Units Subcutaneous TIDWM    insulin detemir U-100  36 Units Subcutaneous Daily    metoprolol tartrate  12.5 mg Oral BID    polyethylene glycol  17 g Oral Daily    potassium chloride  20 mEq Oral BID    tamsulosin  0.4 mg Oral Daily     PRN Meds:acetaminophen, aspirin, dextrose 50%, dextrose 50%, glucagon (human recombinant), glucose, glucose, hydrALAZINE, insulin aspart U-100, ondansetron, oxyCODONE, oxyCODONE, pneumoc 13-wilian conj-dip cr(PF), promethazine (PHENERGAN) IVPB, senna-docusate 8.6-50  mg, sodium chloride 0.9%     Objective:     Vital Signs (Most Recent):  Temp: 97.7 °F (36.5 °C) (09/14/20 1109)  Pulse: 78 (09/14/20 1335)  Resp: 18 (09/14/20 1109)  BP: (!) 94/53 (09/14/20 1109)  SpO2: (!) 94 % (09/14/20 1109) Vital Signs (24h Range):  Temp:  [97.7 °F (36.5 °C)-98.8 °F (37.1 °C)] 97.7 °F (36.5 °C)  Pulse:  [70-78] 78  Resp:  [16-18] 18  SpO2:  [89 %-96 %] 94 %  BP: ()/(53-65) 94/53     Weight: 117.6 kg (259 lb 4.2 oz)  Body mass index is 38.29 kg/m².    SpO2: (!) 94 %  O2 Device (Oxygen Therapy): room air    Intake/Output - Last 3 Shifts       09/12 0700 - 09/13 0659 09/13 0700 - 09/14 0659 09/14 0700 - 09/15 0659    P.O. 960 1660 480    I.V. (mL/kg)       Total Intake(mL/kg) 960 (7.8) 1660 (14.1) 480 (4.1)    Urine (mL/kg/hr) 2715 (0.9) 2300 (0.8) 700 (0.9)    Other 0 0     Stool  0 0    Chest Tube 180 80     Total Output 2895 2380 700    Net -1935 -720 -220           Stool Occurrence  0 x 1 x          Lines/Drains/Airways     Central Venous Catheter Line                 Percutaneous Central Line Insertion/Assessment - Quad lumen  09/09/20 0901 5 days          Peripheral Intravenous Line                 Peripheral IV - Single Lumen 09/12/20 1255 20 G Anterior;Left Hand 2 days         Peripheral IV - Single Lumen 09/12/20 1256 20 G Anterior;Right Hand 2 days                Physical Exam  Constitutional:       Appearance: He is well-developed.   Cardiovascular:      Rate and Rhythm: Normal rate and regular rhythm.      Heart sounds: Normal heart sounds.   Pulmonary:      Effort: Pulmonary effort is normal.      Breath sounds: Normal breath sounds.   Abdominal:      Palpations: Abdomen is soft.   Musculoskeletal: Normal range of motion.   Skin:     General: Skin is warm and dry.      Comments: Sternal Incision CDI   Neurological:      Mental Status: He is alert and oriented to person, place, and time.         Significant Labs:  BMP:   Recent Labs   Lab 09/14/20  0450   *   *   K  3.7   CL 92*   CO2 29   BUN 27*   CREATININE 0.8   CALCIUM 8.8   MG 1.9     CBC:   Recent Labs   Lab 09/14/20  0450   WBC 16.03*   RBC 3.83*   HGB 10.8*   HCT 34.4*   *   MCV 90   MCH 28.2   MCHC 31.4*       Significant Diagnostics:  I have reviewed all pertinent imaging results/findings within the past 24 hours.    Assessment/Plan:     S/P CABG (coronary artery bypass graft)  Sternal Precautions  PT/OT  Ambulate  ASA  BB  Replaced mag and potassium  D/c chest tube and prevena    Dispo: D/c to rehab this week       Leukocytosis  Continue to monitor    Hyponatremia  Continue to monitor     Debility  PMR consulted     Mixed hyperlipidemia  Statin    Essential hypertension  Norvasc daily     Uncontrolled type 2 diabetes mellitus without complication, without long-term current use of insulin  Endocrine following     Obesity (BMI 35.0-39.9 without comorbidity)  Cardiac diet        Rosalva Rivera NP  Cardiothoracic Surgery  [facility]

## 2020-09-14 NOTE — PT/OT/SLP PROGRESS
"Physical Therapy Treatment    Patient Name:  Bc Garcia   MRN:  [id number]    Recommendations:     Discharge Recommendations:  home health PT   Discharge Equipment Recommendations: none   Barriers to discharge: None    Assessment:     Bc Garcia is a 70 y.o. male admitted with a medical diagnosis of NSTEMI (non-ST elevated myocardial infarction).  He presents with the following impairments/functional limitations:  weakness, impaired functional mobilty, gait instability. Good progress today with therapy as evidenced by patient ability to walk in halls with no AD and no LOB. Will benefit from  PT    Rehab Prognosis: Good; patient would benefit from acute skilled PT services to address these deficits and reach maximum level of function.    Recent Surgery: Procedure(s) (LRB):  CORONARY ARTERY BYPASS GRAFT (CABG), left internal mammary artery harvest (N/A)  SURGICAL PROCUREMENT, VEIN, ENDOSCOPIC (Left)  CLOSURE, WOUND, STERNUM (N/A)  APPLICATION, WOUND VAC, Prevena 40 x 5 5 Days Post-Op    Plan:     During this hospitalization, patient to be seen 5 x/week to address the identified rehab impairments via gait training, therapeutic activities, therapeutic exercises and progress toward the following goals:    · Plan of Care Expires:  10/11/20    Subjective     Chief Complaint: nurse  Patient/Family Comments/goals: "I think I need to use the bathroom".   Pain/Comfort:  · Pain Rating 1: 0/10      Objective:     Communicated with nurse prior to session.  Patient found up in chair with arterial line, blood pressure cuff, central line, wound vac, chest tube upon PT entry to room.     General Precautions: Standard, sternal, fall   Orthopedic Precautions:N/A   Braces: N/A     Functional Mobility:  · Transfers:     · Sit to Stand:  contact guard assistance with no AD  · Gait: 150 ft with CGA, SOB, lateral sway, decreased dorsiflexion at heel strike, decreased knee flexion at toe off.       AM-PAC 6 CLICK MOBILITY  Turning over " in bed (including adjusting bedclothes, sheets and blankets)?: 3  Sitting down on and standing up from a chair with arms (e.g., wheelchair, bedside commode, etc.): 3  Moving from lying on back to sitting on the side of the bed?: 3  Moving to and from a bed to a chair (including a wheelchair)?: 3  Need to walk in hospital room?: 3  Climbing 3-5 steps with a railing?: 2  Basic Mobility Total Score: 17       Therapeutic Activities and Exercises:   white board updated  educ patient on benefit of increased OOB activity.   Co tx with OT performed with this visit due to patient need for 2 skilled therapy to ensure safety and accommodate for patient activity tolerance.     Patient left up in chair with all lines intact, call button in reach and nurse notified..    GOALS:   Multidisciplinary Problems     Physical Therapy Goals        Problem: Physical Therapy Goal    Goal Priority Disciplines Outcome Goal Variances Interventions   Physical Therapy Goal     PT, PT/OT Ongoing, Progressing     Description: Goals to be met by: 2020     Patient will increase functional independence with mobility by performin. Sit to stand transfer with Supervision  2. Bed to chair transfer with Supervision using LRAD  3. Gait  x 150 feet with Supervision using LRAD.   4. Ascend/descend 5 stair with no Handrails Stand-by Assistance using no AD.   5. Lower extremity exercise program x20 reps per handout, with independence  6. Recite 3/3 sternal precautions and remain complaint with precautions throughout session with no verbal cues                     Time Tracking:     PT Received On: 20  PT Start Time: 1017     PT Stop Time: 1040  PT Total Time (min): 23 min     Billable Minutes: Gait Training 15 and Therapeutic Activity 10    Treatment Type: Treatment  PT/PTA: PTA     PTA Visit Number: 1     Crystal Awan, NADYA  2020

## 2020-09-14 NOTE — ASSESSMENT & PLAN NOTE
-chest pain and elevated troponin at OSH and found to have NSTEMI  -transferred to [facility] and found to have L main disease  -now s/p CABG x 2 with wound vac placement with CTS  -Chest tube in place

## 2020-09-17 ENCOUNTER — NURSE TRIAGE (OUTPATIENT)
Dept: ADMINISTRATIVE | Facility: CLINIC | Age: 70
End: 2020-09-17

## 2020-09-17 NOTE — TELEPHONE ENCOUNTER
Pt currently admitted to rehab facility. No call required and no additional follow-up per post procedure protocol.    Reason for Disposition   Caller has cancelled the call before the first contact    Protocols used: NO CONTACT OR DUPLICATE CONTACT CALL-A-AH

## 2020-09-22 ENCOUNTER — NURSE TRIAGE (OUTPATIENT)
Dept: ADMINISTRATIVE | Facility: CLINIC | Age: 70
End: 2020-09-22

## 2020-09-22 NOTE — TELEPHONE ENCOUNTER
Post Procedural Symptom Tracker. Pt is currently an inpatient.at a skilled Nursing facility  No contact made. No follow up needed.    Reason for Disposition   Caller has already spoken with the PCP and has no further questions.    Protocols used: NO CONTACT OR DUPLICATE CONTACT CALL-A-AH

## 2020-09-25 NOTE — ADDENDUM NOTE
Addendum  created 09/25/20 0958 by Konstantin Benitez MD    Clinical Note Signed, Intraprocedure Blocks edited, LDA updated via procedure documentation

## 2020-09-28 ENCOUNTER — TELEPHONE (OUTPATIENT)
Dept: CARDIOTHORACIC SURGERY | Facility: CLINIC | Age: 70
End: 2020-09-28

## 2020-09-28 NOTE — TELEPHONE ENCOUNTER
Called pt to ensure he received his post-op appt info for 10/5 with Dr. Vazquez, [facility]  No answer; left VM asking pt to call me back.

## 2020-09-29 ENCOUNTER — TELEPHONE (OUTPATIENT)
Dept: CARDIOTHORACIC SURGERY | Facility: CLINIC | Age: 70
End: 2020-09-29

## 2020-09-29 NOTE — TELEPHONE ENCOUNTER
"Called pt to ensure that he received his post-op visit appt slips in the mail.  Pt stated that he lives 5 hours away, and transportation will be difficult for him to make the visit.  We discussed having the possibility of the pt performing his ECG and CXR close to home, in which I would have the results faxed to Dr. Vazquez's office prior to the visit, and then conduct a video visit on Monday, 10/5.  The pt informed me that the pt still has mid-sternal and chest tube site sutures in place.  The pt stated that I can call his PCP' office to determine if they would be willing to remove the sutures.  Since traveling will be a burden for the pt, I spoke with Dr. Vazquez about the possibility of the pt's PCP removing pt's sutures and having his 12 lead EKG and CXR (PA and lateral) performed near home, at [facility].  Dr. Vazquez stated that he would prefer for the patient to come to the CTS clinic on [facility] in [place], so that Dr. Vazquez can perform an evaluation of pt's incisions and the healing of pt's sternum, s/p CABG.  I again reiterated this to the pt who informed me that traveling to [place] from [place] "would be a financial and physical burden" for him as "the drive is 5 hours each way, and he would anticipate being here for at least 1 hour, which would have the pt away from home at least 11 to 12 hours.  I ensured that pt understood the importance of having the surgeon who performed his CABG be the person to evaluate his healing and remove his sutures, which pt continued to verbalize understanding and again stated that he is unable to travel back to [place]. I then called pt's PCP, Brody Barajas NP's office ([facility] [[phone]]), to determine if Mr. Barajas would be comfortable removing pt's sutures from his sternum and chest tube sites.  GONZÁLEZ Barajas, informed me that he is familiar with Mr. Garcia and saw him on 9/25, and verbalized that he "would be fine " "with seeing Mr. Garcia again to remove his mid-sternal and chest tube site sutures".  I asked if GONZÁLEZ Barajas, would be able to submit a picture of the incisions, before and after suture removal, for pt's chart, which he verbalized understanding to.  I then provided GONZÁLEZ Barajas, with my email address for the photo submission and faxed the order to remove the sutures to his office.  I then called Mr. Garcia back and informed him that orders for his CXR and EKG were sent to [facility] and that I spoke with Tin ([phone]) who will fax the results to me as soon as they are available for pt's appt on 10/5.  Pt informed, per Tin, that pt can walk-in on Friday, 10/2 for his CXR and EKG, and I encouraged him to do so as early as possible that day, so that Dr. Vazquez's office can receive the results for his visit, which pt verbalized understanding to.  Pt was sent a [facility] message to conduct a virtual visit with Dr. Vazquez on 10/5, and pt stated that he did receive that message with instructions on how to log on and check in for his virtual visit.  Pt also informed that I spoke with GONZÁLEZ Barajas, who is willing to remove his sutures and that an appt was made for pt to see GONZÁLEZ Barajas on Tuesday, October 6 at 1:30 for this.  Pt verbalized understanding to all instructions.  Pt informed me that he is scheduled to see his Cardiologist (Dr. Tej Fletcher) on 10/15.  Pt stated that he would be willing to initiate Cardiac Rehab at [facility] in [place].  Pt instructed to contact the clinic with any questions, which he verbalized understanding to.       "

## 2020-10-02 ENCOUNTER — TELEPHONE (OUTPATIENT)
Dept: CARDIOTHORACIC SURGERY | Facility: CLINIC | Age: 70
End: 2020-10-02

## 2020-10-02 NOTE — TELEPHONE ENCOUNTER
Called pt to confirm virtual visit with Dr. Vazquez on 10/5 and CXR and EKG appts for today.  No answer; will call back this afternoon.

## 2020-10-05 ENCOUNTER — OFFICE VISIT (OUTPATIENT)
Dept: CARDIOTHORACIC SURGERY | Facility: CLINIC | Age: 70
End: 2020-10-05
Payer: MEDICARE

## 2020-10-05 DIAGNOSIS — Z95.1 S/P CABG (CORONARY ARTERY BYPASS GRAFT): Primary | ICD-10-CM

## 2020-10-05 PROCEDURE — 99024 PR POST-OP FOLLOW-UP VISIT: ICD-10-PCS | Mod: 95,POP,, | Performed by: THORACIC SURGERY (CARDIOTHORACIC VASCULAR SURGERY)

## 2020-10-05 PROCEDURE — 99024 POSTOP FOLLOW-UP VISIT: CPT | Mod: 95,POP,, | Performed by: THORACIC SURGERY (CARDIOTHORACIC VASCULAR SURGERY)

## 2020-10-05 NOTE — PROGRESS NOTES
The patient location is: their home   The chief complaint leading to consultation is: post op     Visit type: audiovisual    Face to Face time with patient: 10 minutes     15 minutes of total time spent on the encounter, which includes face to face time and non-face to face time preparing to see the patient (eg, review of tests), Obtaining and/or reviewing separately obtained history, Documenting clinical information in the electronic or other health record, Independently interpreting results (not separately reported) and communicating results to the patient/family/caregiver, or Care coordination (not separately reported).     Each patient to whom he or she provides medical services by telemedicine is:  (1) informed of the relationship between the physician and patient and the respective role of any other health care provider with respect to management of the patient; and (2) notified that he or she may decline to receive medical services by telemedicine and may withdraw from such care at any time.      Patient seen and examined. Patient is progressively increasing activity. No significant complaints. Going for suture removal tomorrow with PCP. Get SOB some days with ambulation. Denies any lower extremity swelling.      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

## 2020-10-06 ENCOUNTER — DOCUMENTATION ONLY (OUTPATIENT)
Dept: CARDIOTHORACIC SURGERY | Facility: CLINIC | Age: 70
End: 2020-10-06

## 2020-10-06 NOTE — PROGRESS NOTES
Pictures below received from Brody Barajas NP, pt's PCP in [place], who saw and removed pt's sutures today in clinic from CABG on 9/9/2020.  Pictures, taken just before suture removal and immediately after, reviewed by Rosalva Rivera NP, and Dr. Vazquez.

## 2022-09-12 NOTE — NURSING
End of Shift Note: Medical    What matters most to the patient this shift: Figuring out what is wrong with my legs    Significant Events: Patient was started on PO Lasix. ECHO completed today.    Pain Management: Last Pain Score: Numeric Rating Scale 0-10: 0 (09/12/22 1520)                                      Pain medication: none  Last given: NA  Diet: Cardiac; Fluid Restrict 1800ml (1020 From Dietary) Diet  2 Times/day W Lunch & Dinner; Prosource Gelatein 20/gelatin, High Protein, Sugar Free, Orange Oral Nutrition Supplement      Bowel Function:  Normal  LBM:  Stool Occurrence: 1 (09/12/22 0800)   Activity:  Activity: Chair (all types) (09/12/22 1640)  Mobility Assistive Device:  Mobility Assistive Device: Walker;Gait belt (09/12/22 1520)  Level of Assistance:  Level of Assistance: Minimal assist (09/12/22 1520)  Positioning: Positioning: Semi-fowlers (09/12/22 0741)  LDAs: peripheral IV   Patient's Anticipated Discharge Needs: Return to assisted living ( T) (09/11/22 2300)  Expected Discharge Date: TBD  Expected Discharge Time: TBD       Pt disconnected from wall monitor, and connected to portable monitor. Pt transferred to CT - portable telemetry and ambu bag present. CT chest Obtained. Pt transferred back to SICU [id number] and reconnected to wall monitor. Pt reassessed, findings concur with prior assessments. Vital signs remained appropriate for this particular patient during transport and during of procedure.

## 2024-11-29 NOTE — PLAN OF CARE
SW spoke with patient's wife and daughter regarding housing questions for assistance due to being from [place]. SW provided patient's family with [employer] phone number who can assist with finding housing.     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

## (undated) DEVICE — SEE MEDLINE ITEM 157117

## (undated) DEVICE — BLADE SAW STERNAL 5/BX

## (undated) DEVICE — DRAPE SLUSH WARMER WITH DISC

## (undated) DEVICE — CATH THORACIC 32FR ST

## (undated) DEVICE — DRESSING TELFA STRL 4X3 LF

## (undated) DEVICE — TRAY HEART

## (undated) DEVICE — SUT MCRYL PLUS 4-0 PS2 27IN

## (undated) DEVICE — SUT PROLENE 4-0 RB-1 BL MO

## (undated) DEVICE — ELECTRODE REM PLYHSV RETURN 9

## (undated) DEVICE — SEE MEDLINE ITEM 152515

## (undated) DEVICE — PAD K-THERMIA 24IN X 60IN

## (undated) DEVICE — SUT LIGACLIP SMALL XTRA

## (undated) DEVICE — SUT PROLENE 4-0 SH BLU 36IN

## (undated) DEVICE — PUNCH AORTIC 4.0MM 6/CASE

## (undated) DEVICE — CONNECTOR 1/4X3/16X3/16 Y

## (undated) DEVICE — CATH DXTERITY JR40 100CM 5FR

## (undated) DEVICE — WIRE GUIDE SAFE-T-J .035 260CM

## (undated) DEVICE — TAPE CLOTH SOFT MEDIPORE 4IN

## (undated) DEVICE — PLEDGET SUT SOFT 3/8X3/16X1/16

## (undated) DEVICE — BLADE MICROSHARP BLUE 3MM 15DE

## (undated) DEVICE — CLIP SPRING 6MM

## (undated) DEVICE — SUT 2/0 30IN ETHIBOND

## (undated) DEVICE — SUT PROLENE 5-0 BL C-1 4-24

## (undated) DEVICE — GLOVE BIOGEL PI MICRO SZ 7.5

## (undated) DEVICE — OMNIPAQUE 350MG 150ML VIAL

## (undated) DEVICE — KIT SAHARA DRAPE DRAW/LIFT

## (undated) DEVICE — SUT SILK BLK BR. 2 2-60

## (undated) DEVICE — ADHESIVE DERMABOND ADVANCED

## (undated) DEVICE — SUT VICRYL 3-0 27 SH

## (undated) DEVICE — DRAPE SPLIT STERILE

## (undated) DEVICE — PAD DEFIB CADENCE ADULT R2

## (undated) DEVICE — BLOWER MISTER

## (undated) DEVICE — HEMOSTAT VASC BAND LONG 27CM

## (undated) DEVICE — KIT SYR REUSABLE

## (undated) DEVICE — DRESSING ADH ISLAND 3.6 X 14

## (undated) DEVICE — GAUZE SPONGE 4X4 12PLY

## (undated) DEVICE — SUT 6 18IN STEEL MONO CCS

## (undated) DEVICE — KIT HAND CONTROL HIGH PRESSUR

## (undated) DEVICE — PAD RADI FEMORAL

## (undated) DEVICE — SUT BONE WAX 2.5 GRMS 12/BX

## (undated) DEVICE — TUBING HF INSUFFLATION W/ FLTR

## (undated) DEVICE — CANNULA VESSEL FREE FLOW

## (undated) DEVICE — CLOSURE SKIN STERI STRIP 1/2X4

## (undated) DEVICE — CATH EMPULSE ANGLED 5FR PIGTAI

## (undated) DEVICE — WIRE INTRAMYOCARDIAL TEMP

## (undated) DEVICE — CLIPS VASCU-STAT YELLOW

## (undated) DEVICE — CANNULA IMA 1MM

## (undated) DEVICE — SEE MEDLINE ITEM 157128

## (undated) DEVICE — NDL BOX COUNTER

## (undated) DEVICE — SUT 2/0 36IN ETHIBOND EXTR

## (undated) DEVICE — DRESSING AQUACEL SACRAL 9 X 9

## (undated) DEVICE — DRESSING TRANS 4X4 TEGADERM

## (undated) DEVICE — CATH THOR STND RGHT ANG 32FR

## (undated) DEVICE — SUT PROLENE 7-0 BV-1 30

## (undated) DEVICE — SUT 4-0 12-18IN SILK BLACK

## (undated) DEVICE — BLADE 4IN EDGE INSULATED

## (undated) DEVICE — PACK CATH LAB

## (undated) DEVICE — KIT GLIDESHEATH SLEND 6FR 10CM

## (undated) DEVICE — BANDAGE ACE ELASTIC 6"

## (undated) DEVICE — SUT VICRYL BR 1 GEN 27 CT-1

## (undated) DEVICE — KIT PREVENA PLUS

## (undated) DEVICE — KIT URINARY CATH URINE METER

## (undated) DEVICE — RETRACTOR OCTOBASE INSERT HOLD

## (undated) DEVICE — KIT MANIFOLD LOW PRESS TUBING

## (undated) DEVICE — SEE MEDLINE ITEM 146417

## (undated) DEVICE — SET DECANTER MEDICHOICE

## (undated) DEVICE — INSERTS STEALTH FIBRA SIZE 5

## (undated) DEVICE — APPLIER LIGACLIP SM 9.38IN

## (undated) DEVICE — DRESSING TELFA N ADH 3X8

## (undated) DEVICE — SYS VIRTUOSAPH PLUS EVM

## (undated) DEVICE — SYR 3CC LUER LOC

## (undated) DEVICE — BLADE 4 INCH EDGE UN-INS

## (undated) DEVICE — WIPE ESENTA BARR STNG FREE 3ML

## (undated) DEVICE — BLADE BEAVER 15 DEG 1.5MM

## (undated) DEVICE — CATH DXTERITY JL35 100CM 5FR

## (undated) DEVICE — DRAIN CHEST DRY SUCTION

## (undated) DEVICE — SUT SILK 2-0 SH 18IN BLACK